# Patient Record
Sex: MALE | Race: WHITE | Employment: UNEMPLOYED | URBAN - METROPOLITAN AREA
[De-identification: names, ages, dates, MRNs, and addresses within clinical notes are randomized per-mention and may not be internally consistent; named-entity substitution may affect disease eponyms.]

---

## 2017-01-11 RX ORDER — DEXTROAMPHETAMINE SACCHARATE, AMPHETAMINE ASPARTATE, DEXTROAMPHETAMINE SULFATE AND AMPHETAMINE SULFATE 5; 5; 5; 5 MG/1; MG/1; MG/1; MG/1
20 TABLET ORAL 2 TIMES DAILY
Qty: 60 TABLET | Refills: 0 | Status: SHIPPED | OUTPATIENT
Start: 2017-01-11 | End: 2017-02-09 | Stop reason: SDUPTHER

## 2017-01-11 RX ORDER — ALPRAZOLAM 2 MG/1
2 TABLET ORAL 3 TIMES DAILY PRN
Qty: 75 TABLET | Refills: 0 | Status: SHIPPED | OUTPATIENT
Start: 2017-01-11 | End: 2017-02-09 | Stop reason: SDUPTHER

## 2017-01-30 RX ORDER — NADOLOL 20 MG/1
20 TABLET ORAL DAILY
Qty: 30 TABLET | Refills: 11 | Status: SHIPPED | OUTPATIENT
Start: 2017-01-30 | End: 2017-03-08 | Stop reason: SDUPTHER

## 2017-01-31 RX ORDER — TRIAMCINOLONE ACETONIDE 0.25 MG/G
CREAM TOPICAL
Qty: 1 TUBE | Refills: 0 | Status: SHIPPED | OUTPATIENT
Start: 2017-01-31 | End: 2017-03-08 | Stop reason: SDUPTHER

## 2017-02-01 ENCOUNTER — OFFICE VISIT (OUTPATIENT)
Dept: DERMATOLOGY | Age: 47
End: 2017-02-01

## 2017-02-01 DIAGNOSIS — L82.1 SEBORRHEIC KERATOSES: ICD-10-CM

## 2017-02-01 DIAGNOSIS — L72.0 EPIDERMAL CYST: ICD-10-CM

## 2017-02-01 DIAGNOSIS — L71.9 ROSACEA: Primary | ICD-10-CM

## 2017-02-01 DIAGNOSIS — L21.9 SEBORRHEIC DERMATITIS: ICD-10-CM

## 2017-02-01 DIAGNOSIS — L73.8 SEBACEOUS HYPERPLASIA: ICD-10-CM

## 2017-02-01 DIAGNOSIS — D22.9 BENIGN NEVUS: ICD-10-CM

## 2017-02-01 PROCEDURE — 99242 OFF/OP CONSLTJ NEW/EST SF 20: CPT | Performed by: DERMATOLOGY

## 2017-02-01 RX ORDER — PIMECROLIMUS 10 MG/G
CREAM TOPICAL
Qty: 1 BOTTLE | Refills: 4 | Status: SHIPPED | OUTPATIENT
Start: 2017-02-01 | End: 2018-11-02 | Stop reason: ALTCHOICE

## 2017-02-04 DIAGNOSIS — R94.6 ABNORMAL THYROID SCAN: Primary | ICD-10-CM

## 2017-02-09 RX ORDER — DEXTROAMPHETAMINE SACCHARATE, AMPHETAMINE ASPARTATE, DEXTROAMPHETAMINE SULFATE AND AMPHETAMINE SULFATE 5; 5; 5; 5 MG/1; MG/1; MG/1; MG/1
20 TABLET ORAL 2 TIMES DAILY
Qty: 60 TABLET | Refills: 0 | Status: SHIPPED | OUTPATIENT
Start: 2017-02-09 | End: 2017-02-15 | Stop reason: SDUPTHER

## 2017-02-09 RX ORDER — ALPRAZOLAM 2 MG/1
2 TABLET ORAL 3 TIMES DAILY PRN
Qty: 75 TABLET | Refills: 0 | Status: SHIPPED | OUTPATIENT
Start: 2017-02-09 | End: 2017-04-05 | Stop reason: SDUPTHER

## 2017-02-15 ENCOUNTER — OFFICE VISIT (OUTPATIENT)
Dept: FAMILY MEDICINE CLINIC | Age: 47
End: 2017-02-15

## 2017-02-15 ENCOUNTER — OFFICE VISIT (OUTPATIENT)
Dept: ENDOCRINOLOGY | Age: 47
End: 2017-02-15

## 2017-02-15 VITALS
DIASTOLIC BLOOD PRESSURE: 82 MMHG | BODY MASS INDEX: 34.21 KG/M2 | HEIGHT: 69 IN | WEIGHT: 231 LBS | HEART RATE: 73 BPM | SYSTOLIC BLOOD PRESSURE: 128 MMHG

## 2017-02-15 VITALS
WEIGHT: 231 LBS | HEIGHT: 69 IN | OXYGEN SATURATION: 98 % | HEART RATE: 73 BPM | BODY MASS INDEX: 34.21 KG/M2 | DIASTOLIC BLOOD PRESSURE: 82 MMHG | SYSTOLIC BLOOD PRESSURE: 128 MMHG | TEMPERATURE: 98.9 F

## 2017-02-15 DIAGNOSIS — R79.89 LOW SERUM CORTISOL LEVEL: ICD-10-CM

## 2017-02-15 DIAGNOSIS — E23.7 PITUITARY ABNORMALITY (HCC): ICD-10-CM

## 2017-02-15 DIAGNOSIS — F41.9 ANXIETY: ICD-10-CM

## 2017-02-15 DIAGNOSIS — J02.9 SORE THROAT: Primary | ICD-10-CM

## 2017-02-15 DIAGNOSIS — R79.89 ABNORMAL THYROID BLOOD TEST: Primary | ICD-10-CM

## 2017-02-15 DIAGNOSIS — J01.90 ACUTE NON-RECURRENT SINUSITIS, UNSPECIFIED LOCATION: ICD-10-CM

## 2017-02-15 DIAGNOSIS — F90.0 ATTENTION DEFICIT HYPERACTIVITY DISORDER (ADHD), PREDOMINANTLY INATTENTIVE TYPE: ICD-10-CM

## 2017-02-15 PROCEDURE — 99214 OFFICE O/P EST MOD 30 MIN: CPT | Performed by: FAMILY MEDICINE

## 2017-02-15 PROCEDURE — 99215 OFFICE O/P EST HI 40 MIN: CPT | Performed by: INTERNAL MEDICINE

## 2017-02-15 RX ORDER — MELATONIN 3 MG
1 TABLET ORAL
Qty: 90 CAPSULE | Refills: 2 | Status: SHIPPED | OUTPATIENT
Start: 2017-02-15 | End: 2021-04-27

## 2017-02-15 RX ORDER — DEXTROAMPHETAMINE SACCHARATE, AMPHETAMINE ASPARTATE, DEXTROAMPHETAMINE SULFATE AND AMPHETAMINE SULFATE 5; 5; 5; 5 MG/1; MG/1; MG/1; MG/1
20 TABLET ORAL 2 TIMES DAILY
Qty: 60 TABLET | Refills: 0 | Status: SHIPPED | OUTPATIENT
Start: 2017-02-15 | End: 2017-04-05 | Stop reason: SDUPTHER

## 2017-02-15 RX ORDER — COSYNTROPIN 0.25 MG/ML
0.25 INJECTION, POWDER, FOR SOLUTION INTRAMUSCULAR; INTRAVENOUS ONCE
Qty: 250 MCG | Refills: 0 | Status: SHIPPED | OUTPATIENT
Start: 2017-02-15 | End: 2017-02-15

## 2017-02-15 RX ORDER — AMOXICILLIN AND CLAVULANATE POTASSIUM 875; 125 MG/1; MG/1
1 TABLET, FILM COATED ORAL 2 TIMES DAILY
Qty: 20 TABLET | Refills: 0 | Status: SHIPPED | OUTPATIENT
Start: 2017-02-15 | End: 2017-02-25

## 2017-02-15 ASSESSMENT — PATIENT HEALTH QUESTIONNAIRE - PHQ9
SUM OF ALL RESPONSES TO PHQ QUESTIONS 1-9: 0
2. FEELING DOWN, DEPRESSED OR HOPELESS: 0
SUM OF ALL RESPONSES TO PHQ9 QUESTIONS 1 & 2: 0
1. LITTLE INTEREST OR PLEASURE IN DOING THINGS: 0

## 2017-02-17 LAB — THROAT CULTURE: NORMAL

## 2017-03-01 ENCOUNTER — HOSPITAL ENCOUNTER (OUTPATIENT)
Dept: OTHER | Age: 47
Discharge: HOME | End: 2017-03-01
Attending: INTERNAL MEDICINE | Admitting: INTERNAL MEDICINE

## 2017-03-01 DIAGNOSIS — R94.6 ABNORMAL THYROID SCAN: ICD-10-CM

## 2017-03-07 ENCOUNTER — TELEPHONE (OUTPATIENT)
Dept: ENDOCRINOLOGY | Age: 47
End: 2017-03-07

## 2017-03-07 ENCOUNTER — HOSPITAL ENCOUNTER (OUTPATIENT)
Dept: OTHER | Age: 47
Discharge: HOME | End: 2017-03-07
Attending: INTERNAL MEDICINE | Admitting: INTERNAL MEDICINE

## 2017-03-07 LAB
CORTISOL - AM: 3.1 UG/DL (ref 4.3–22.4)
VITAMIN D 25-HYDROXY: 35.7 NG/ML

## 2017-03-08 ENCOUNTER — PATIENT MESSAGE (OUTPATIENT)
Dept: FAMILY MEDICINE CLINIC | Age: 47
End: 2017-03-08

## 2017-03-08 RX ORDER — LISINOPRIL 20 MG/1
20 TABLET ORAL DAILY
Qty: 90 TABLET | Refills: 1 | Status: SHIPPED | OUTPATIENT
Start: 2017-03-08 | End: 2017-07-12 | Stop reason: SDUPTHER

## 2017-03-08 RX ORDER — NADOLOL 20 MG/1
20 TABLET ORAL DAILY
Qty: 90 TABLET | Refills: 1 | Status: SHIPPED | OUTPATIENT
Start: 2017-03-08 | End: 2017-07-12 | Stop reason: SDUPTHER

## 2017-03-08 RX ORDER — TRIAMCINOLONE ACETONIDE 0.25 MG/G
CREAM TOPICAL
Qty: 1 TUBE | Refills: 0 | Status: SHIPPED | OUTPATIENT
Start: 2017-03-08 | End: 2017-06-15 | Stop reason: SDUPTHER

## 2017-03-08 RX ORDER — FLUTICASONE PROPIONATE 50 MCG
2 SPRAY, SUSPENSION (ML) NASAL DAILY
Qty: 1 BOTTLE | Refills: 5 | Status: SHIPPED | OUTPATIENT
Start: 2017-03-08 | End: 2018-03-03 | Stop reason: SDUPTHER

## 2017-03-15 LAB — MISCELLANEOUS LAB TEST RESULT: NORMAL

## 2017-03-17 LAB — MISCELLANEOUS LAB TEST ORDER: NORMAL

## 2017-03-22 ENCOUNTER — TELEPHONE (OUTPATIENT)
Dept: ENDOCRINOLOGY | Age: 47
End: 2017-03-22

## 2017-03-22 DIAGNOSIS — R79.89 LOW SERUM CORTISOL LEVEL: Primary | ICD-10-CM

## 2017-03-22 RX ORDER — COSYNTROPIN 0.25 MG/ML
0.25 INJECTION, POWDER, FOR SOLUTION INTRAMUSCULAR; INTRAVENOUS ONCE
Qty: 250 MCG | Refills: 0 | Status: SHIPPED | OUTPATIENT
Start: 2017-03-22 | End: 2017-03-22

## 2017-04-05 RX ORDER — DEXTROAMPHETAMINE SACCHARATE, AMPHETAMINE ASPARTATE, DEXTROAMPHETAMINE SULFATE AND AMPHETAMINE SULFATE 5; 5; 5; 5 MG/1; MG/1; MG/1; MG/1
20 TABLET ORAL 2 TIMES DAILY
Qty: 60 TABLET | Refills: 0 | Status: SHIPPED | OUTPATIENT
Start: 2017-04-05 | End: 2017-05-02 | Stop reason: SDUPTHER

## 2017-04-05 RX ORDER — ALPRAZOLAM 2 MG/1
2 TABLET ORAL 3 TIMES DAILY PRN
Qty: 75 TABLET | Refills: 0 | Status: SHIPPED | OUTPATIENT
Start: 2017-04-05 | End: 2017-05-02 | Stop reason: SDUPTHER

## 2017-04-21 ENCOUNTER — HOSPITAL ENCOUNTER (OUTPATIENT)
Dept: ONCOLOGY | Age: 47
Discharge: HOME | End: 2017-04-24
Attending: INTERNAL MEDICINE | Admitting: INTERNAL MEDICINE

## 2017-04-21 VITALS
SYSTOLIC BLOOD PRESSURE: 111 MMHG | DIASTOLIC BLOOD PRESSURE: 85 MMHG | HEART RATE: 60 BPM | RESPIRATION RATE: 18 BRPM | TEMPERATURE: 98 F

## 2017-04-21 LAB
CORTISOL 30 MIN: 13.8 UG/DL
CORTISOL 60 MIN: 15.1 UG/DL
CORTISOL BASE: 7.6 UG/DL

## 2017-04-21 RX ORDER — COSYNTROPIN 0.25 MG/ML
0.25 INJECTION, POWDER, FOR SOLUTION INTRAMUSCULAR; INTRAVENOUS ONCE
Status: COMPLETED | OUTPATIENT
Start: 2017-04-21 | End: 2017-04-21

## 2017-04-21 RX ADMIN — COSYNTROPIN 0.25 MG: 0.25 INJECTION, POWDER, FOR SOLUTION INTRAMUSCULAR; INTRAVENOUS at 11:25

## 2017-04-26 RX ORDER — MULTIVITAMIN WITH FOLIC ACID 400 MCG
TABLET ORAL
Qty: 30 TABLET | Refills: 1 | Status: SHIPPED | OUTPATIENT
Start: 2017-04-26 | End: 2017-07-12 | Stop reason: SDUPTHER

## 2017-04-28 ENCOUNTER — OFFICE VISIT (OUTPATIENT)
Dept: ENDOCRINOLOGY | Age: 47
End: 2017-04-28

## 2017-04-28 VITALS
BODY MASS INDEX: 33.92 KG/M2 | WEIGHT: 229 LBS | DIASTOLIC BLOOD PRESSURE: 60 MMHG | HEIGHT: 69 IN | HEART RATE: 71 BPM | OXYGEN SATURATION: 96 % | SYSTOLIC BLOOD PRESSURE: 100 MMHG

## 2017-04-28 DIAGNOSIS — E27.49 SECONDARY ADRENAL INSUFFICIENCY (HCC): Primary | ICD-10-CM

## 2017-04-28 DIAGNOSIS — E29.1 HYPOGONADISM MALE: ICD-10-CM

## 2017-04-28 DIAGNOSIS — F51.01 PRIMARY INSOMNIA: ICD-10-CM

## 2017-04-28 DIAGNOSIS — R79.89 ABNORMAL THYROID BLOOD TEST: ICD-10-CM

## 2017-04-28 PROCEDURE — 99214 OFFICE O/P EST MOD 30 MIN: CPT | Performed by: INTERNAL MEDICINE

## 2017-04-28 RX ORDER — DEXAMETHASONE 0.5 MG/1
0.5 TABLET ORAL
Qty: 30 TABLET | Refills: 2 | Status: SHIPPED | OUTPATIENT
Start: 2017-04-28 | End: 2017-08-09 | Stop reason: SDUPTHER

## 2017-04-28 ASSESSMENT — PATIENT HEALTH QUESTIONNAIRE - PHQ9
SUM OF ALL RESPONSES TO PHQ QUESTIONS 1-9: 0
SUM OF ALL RESPONSES TO PHQ9 QUESTIONS 1 & 2: 0
2. FEELING DOWN, DEPRESSED OR HOPELESS: 0
1. LITTLE INTEREST OR PLEASURE IN DOING THINGS: 0

## 2017-05-03 RX ORDER — DEXTROAMPHETAMINE SACCHARATE, AMPHETAMINE ASPARTATE, DEXTROAMPHETAMINE SULFATE AND AMPHETAMINE SULFATE 5; 5; 5; 5 MG/1; MG/1; MG/1; MG/1
20 TABLET ORAL 2 TIMES DAILY
Qty: 60 TABLET | Refills: 0 | Status: SHIPPED | OUTPATIENT
Start: 2017-05-03 | End: 2017-05-17 | Stop reason: SDUPTHER

## 2017-05-03 RX ORDER — ALPRAZOLAM 2 MG/1
2 TABLET ORAL 3 TIMES DAILY PRN
Qty: 75 TABLET | Refills: 0 | Status: SHIPPED | OUTPATIENT
Start: 2017-05-03 | End: 2017-05-30 | Stop reason: SDUPTHER

## 2017-05-17 ENCOUNTER — OFFICE VISIT (OUTPATIENT)
Dept: FAMILY MEDICINE CLINIC | Age: 47
End: 2017-05-17

## 2017-05-17 VITALS
DIASTOLIC BLOOD PRESSURE: 82 MMHG | BODY MASS INDEX: 34.27 KG/M2 | OXYGEN SATURATION: 98 % | HEART RATE: 74 BPM | SYSTOLIC BLOOD PRESSURE: 130 MMHG | WEIGHT: 231.4 LBS | HEIGHT: 69 IN

## 2017-05-17 DIAGNOSIS — F90.0 ATTENTION DEFICIT HYPERACTIVITY DISORDER (ADHD), PREDOMINANTLY INATTENTIVE TYPE: ICD-10-CM

## 2017-05-17 DIAGNOSIS — F51.01 PRIMARY INSOMNIA: ICD-10-CM

## 2017-05-17 DIAGNOSIS — F41.9 ANXIETY: Primary | ICD-10-CM

## 2017-05-17 PROCEDURE — 99214 OFFICE O/P EST MOD 30 MIN: CPT | Performed by: FAMILY MEDICINE

## 2017-05-17 RX ORDER — DEXTROAMPHETAMINE SACCHARATE, AMPHETAMINE ASPARTATE, DEXTROAMPHETAMINE SULFATE AND AMPHETAMINE SULFATE 5; 5; 5; 5 MG/1; MG/1; MG/1; MG/1
20 TABLET ORAL 3 TIMES DAILY
Qty: 90 TABLET | Refills: 0 | Status: SHIPPED | OUTPATIENT
Start: 2017-05-17 | End: 2017-06-02

## 2017-05-17 RX ORDER — CLONAZEPAM 0.5 MG/1
1 TABLET ORAL NIGHTLY PRN
COMMUNITY
Start: 2017-05-04 | End: 2017-08-17

## 2017-05-31 ENCOUNTER — TELEPHONE (OUTPATIENT)
Dept: FAMILY MEDICINE CLINIC | Age: 47
End: 2017-05-31

## 2017-05-31 RX ORDER — ALPRAZOLAM 2 MG/1
2 TABLET ORAL 3 TIMES DAILY PRN
Qty: 75 TABLET | Refills: 0 | Status: SHIPPED | OUTPATIENT
Start: 2017-05-31 | End: 2017-06-30 | Stop reason: SDUPTHER

## 2017-06-01 ENCOUNTER — TELEPHONE (OUTPATIENT)
Dept: FAMILY MEDICINE CLINIC | Age: 47
End: 2017-06-01

## 2017-06-02 RX ORDER — TESTOSTERONE 30 MG/1.5ML
90 SOLUTION TOPICAL DAILY
Qty: 2 BOTTLE | Refills: 5 | Status: SHIPPED | OUTPATIENT
Start: 2017-06-02 | End: 2017-07-07 | Stop reason: SDUPTHER

## 2017-06-02 RX ORDER — DEXTROAMPHETAMINE SACCHARATE, AMPHETAMINE ASPARTATE, DEXTROAMPHETAMINE SULFATE AND AMPHETAMINE SULFATE 7.5; 7.5; 7.5; 7.5 MG/1; MG/1; MG/1; MG/1
30 TABLET ORAL 2 TIMES DAILY
Qty: 60 TABLET | Refills: 0 | Status: SHIPPED | OUTPATIENT
Start: 2017-06-02 | End: 2017-06-30 | Stop reason: SDUPTHER

## 2017-06-16 ENCOUNTER — TELEPHONE (OUTPATIENT)
Dept: FAMILY MEDICINE CLINIC | Age: 47
End: 2017-06-16

## 2017-06-16 RX ORDER — TRIAMCINOLONE ACETONIDE 0.25 MG/G
CREAM TOPICAL
Qty: 15 G | Refills: 2 | Status: SHIPPED | OUTPATIENT
Start: 2017-06-16 | End: 2018-11-02 | Stop reason: ALTCHOICE

## 2017-06-20 ENCOUNTER — PATIENT MESSAGE (OUTPATIENT)
Dept: DERMATOLOGY | Age: 47
End: 2017-06-20

## 2017-06-20 LAB
CORTISOL - AM: 10.7 UG/DL (ref 4.3–22.4)
VITAMIN D 25-HYDROXY: 34.9 NG/ML

## 2017-06-21 ENCOUNTER — OFFICE VISIT (OUTPATIENT)
Dept: ENDOCRINOLOGY | Age: 47
End: 2017-06-21

## 2017-06-21 VITALS
DIASTOLIC BLOOD PRESSURE: 88 MMHG | OXYGEN SATURATION: 97 % | HEIGHT: 69 IN | WEIGHT: 228.4 LBS | SYSTOLIC BLOOD PRESSURE: 130 MMHG | HEART RATE: 61 BPM | BODY MASS INDEX: 33.83 KG/M2

## 2017-06-21 DIAGNOSIS — E27.49 SECONDARY ADRENAL INSUFFICIENCY (HCC): Primary | ICD-10-CM

## 2017-06-21 DIAGNOSIS — R79.89 ELEVATED INSULIN-LIKE GROWTH FACTOR 1 (IGF-1) LEVEL: ICD-10-CM

## 2017-06-21 DIAGNOSIS — E55.9 VITAMIN D DEFICIENCY: ICD-10-CM

## 2017-06-21 DIAGNOSIS — E29.1 HYPOGONADISM MALE: ICD-10-CM

## 2017-06-21 DIAGNOSIS — E23.7 PITUITARY ABNORMALITY (HCC): ICD-10-CM

## 2017-06-21 PROCEDURE — 99214 OFFICE O/P EST MOD 30 MIN: CPT | Performed by: INTERNAL MEDICINE

## 2017-06-21 ASSESSMENT — PATIENT HEALTH QUESTIONNAIRE - PHQ9
1. LITTLE INTEREST OR PLEASURE IN DOING THINGS: 0
SUM OF ALL RESPONSES TO PHQ9 QUESTIONS 1 & 2: 0
SUM OF ALL RESPONSES TO PHQ QUESTIONS 1-9: 0
2. FEELING DOWN, DEPRESSED OR HOPELESS: 0

## 2017-06-23 ENCOUNTER — HOSPITAL ENCOUNTER (OUTPATIENT)
Dept: NON INVASIVE DIAGNOSTICS | Age: 47
Discharge: HOME | End: 2017-06-20
Attending: INTERNAL MEDICINE | Admitting: INTERNAL MEDICINE

## 2017-06-23 ENCOUNTER — HOSPITAL ENCOUNTER (OUTPATIENT)
Dept: NON INVASIVE DIAGNOSTICS | Age: 47
Discharge: HOME | End: 2017-06-23
Attending: INTERNAL MEDICINE | Admitting: INTERNAL MEDICINE

## 2017-06-23 ENCOUNTER — TELEPHONE (OUTPATIENT)
Dept: CARDIOLOGY CLINIC | Age: 47
End: 2017-06-23

## 2017-06-23 LAB
LV EF: 55 %
LVEF MODALITY: NORMAL

## 2017-06-30 RX ORDER — ALPRAZOLAM 2 MG/1
2 TABLET ORAL 3 TIMES DAILY PRN
Qty: 75 TABLET | Refills: 0 | Status: SHIPPED | OUTPATIENT
Start: 2017-06-30 | End: 2017-07-31 | Stop reason: SDUPTHER

## 2017-06-30 RX ORDER — DEXTROAMPHETAMINE SACCHARATE, AMPHETAMINE ASPARTATE, DEXTROAMPHETAMINE SULFATE AND AMPHETAMINE SULFATE 7.5; 7.5; 7.5; 7.5 MG/1; MG/1; MG/1; MG/1
30 TABLET ORAL 2 TIMES DAILY
Qty: 60 TABLET | Refills: 0 | Status: SHIPPED | OUTPATIENT
Start: 2017-06-30 | End: 2017-07-31 | Stop reason: SDUPTHER

## 2017-07-06 RX ORDER — TACROLIMUS 1 MG/G
OINTMENT TOPICAL
Qty: 30 G | Refills: 4 | Status: SHIPPED | OUTPATIENT
Start: 2017-07-06 | End: 2017-11-17

## 2017-07-07 RX ORDER — OMEGA-3-ACID ETHYL ESTERS 1 G/1
2 CAPSULE, LIQUID FILLED ORAL 2 TIMES DAILY
Qty: 360 CAPSULE | Refills: 1 | Status: SHIPPED | OUTPATIENT
Start: 2017-07-07 | End: 2021-10-08

## 2017-07-07 RX ORDER — TESTOSTERONE 30 MG/1.5ML
90 SOLUTION TOPICAL DAILY
Qty: 2 BOTTLE | Refills: 3 | Status: SHIPPED | OUTPATIENT
Start: 2017-07-07 | End: 2021-12-14

## 2017-07-10 RX ORDER — KETOCONAZOLE 20 MG/ML
SHAMPOO TOPICAL
Qty: 120 ML | Refills: 2 | Status: SHIPPED | OUTPATIENT
Start: 2017-07-10 | End: 2018-04-02 | Stop reason: SDUPTHER

## 2017-07-12 ENCOUNTER — OFFICE VISIT (OUTPATIENT)
Dept: CARDIOLOGY CLINIC | Age: 47
End: 2017-07-12

## 2017-07-12 VITALS
SYSTOLIC BLOOD PRESSURE: 127 MMHG | HEIGHT: 69 IN | HEART RATE: 67 BPM | OXYGEN SATURATION: 97 % | BODY MASS INDEX: 33.77 KG/M2 | WEIGHT: 228 LBS | DIASTOLIC BLOOD PRESSURE: 72 MMHG

## 2017-07-12 DIAGNOSIS — E78.2 MIXED HYPERLIPIDEMIA: Primary | ICD-10-CM

## 2017-07-12 DIAGNOSIS — I77.810 AORTIC ROOT DILATATION (HCC): ICD-10-CM

## 2017-07-12 DIAGNOSIS — I71.02 DISSECTION OF ABDOMINAL AORTA (HCC): ICD-10-CM

## 2017-07-12 PROCEDURE — 99214 OFFICE O/P EST MOD 30 MIN: CPT | Performed by: INTERNAL MEDICINE

## 2017-07-12 RX ORDER — LISINOPRIL 20 MG/1
20 TABLET ORAL DAILY
Qty: 90 TABLET | Refills: 3 | Status: SHIPPED | OUTPATIENT
Start: 2017-07-12 | End: 2018-07-26 | Stop reason: SDUPTHER

## 2017-07-12 RX ORDER — NADOLOL 20 MG/1
20 TABLET ORAL DAILY
Qty: 90 TABLET | Refills: 3 | Status: SHIPPED | OUTPATIENT
Start: 2017-07-12 | End: 2018-01-25

## 2017-07-17 ENCOUNTER — PATIENT MESSAGE (OUTPATIENT)
Dept: DERMATOLOGY | Age: 47
End: 2017-07-17

## 2017-07-21 ENCOUNTER — HOSPITAL ENCOUNTER (OUTPATIENT)
Dept: ULTRASOUND IMAGING | Age: 47
Discharge: HOME | End: 2017-07-21
Attending: INTERNAL MEDICINE | Admitting: INTERNAL MEDICINE

## 2017-07-21 ENCOUNTER — HOSPITAL ENCOUNTER (OUTPATIENT)
Dept: OTHER | Age: 47
Discharge: HOME | End: 2017-07-21
Attending: INTERNAL MEDICINE | Admitting: INTERNAL MEDICINE

## 2017-07-21 DIAGNOSIS — I77.810 AORTIC ROOT DILATATION (HCC): ICD-10-CM

## 2017-07-21 DIAGNOSIS — I77.810 THORACIC AORTIC ECTASIA (HCC): ICD-10-CM

## 2017-07-21 DIAGNOSIS — I71.02 DISSECTION OF ABDOMINAL AORTA (HCC): ICD-10-CM

## 2017-07-21 LAB
BASOPHILS ABSOLUTE: 0 K/UL (ref 0–0.2)
BASOPHILS RELATIVE PERCENT: 0.4 %
CHOLESTEROL, FASTING: 184 MG/DL (ref 0–199)
EOSINOPHILS ABSOLUTE: 0.1 K/UL (ref 0–0.6)
EOSINOPHILS RELATIVE PERCENT: 2.4 %
HCT VFR BLD CALC: 47.3 % (ref 40.5–52.5)
HDLC SERPL-MCNC: 27 MG/DL (ref 40–60)
HEMOGLOBIN: 15.6 G/DL (ref 13.5–17.5)
LDL CHOLESTEROL CALCULATED: 130 MG/DL
LYMPHOCYTES ABSOLUTE: 1.6 K/UL (ref 1–5.1)
LYMPHOCYTES RELATIVE PERCENT: 31.5 %
MCH RBC QN AUTO: 28.6 PG (ref 26–34)
MCHC RBC AUTO-ENTMCNC: 33 G/DL (ref 31–36)
MCV RBC AUTO: 86.7 FL (ref 80–100)
MONOCYTES ABSOLUTE: 0.5 K/UL (ref 0–1.3)
MONOCYTES RELATIVE PERCENT: 8.9 %
NEUTROPHILS ABSOLUTE: 2.9 K/UL (ref 1.7–7.7)
NEUTROPHILS RELATIVE PERCENT: 56.8 %
PDW BLD-RTO: 14 % (ref 12.4–15.4)
PLATELET # BLD: 164 K/UL (ref 135–450)
PMV BLD AUTO: 8.7 FL (ref 5–10.5)
PROLACTIN: 8 NG/ML
PROSTATE SPECIFIC ANTIGEN: 1.01 NG/ML (ref 0–4)
RBC # BLD: 5.46 M/UL (ref 4.2–5.9)
T3 FREE: 3.9 PG/ML (ref 2.3–4.2)
T4 FREE: 1.3 NG/DL (ref 0.9–1.8)
TRIGLYCERIDE, FASTING: 136 MG/DL (ref 0–150)
TSH SERPL DL<=0.05 MIU/L-ACNC: 2.51 UIU/ML (ref 0.27–4.2)
VLDLC SERPL CALC-MCNC: 27 MG/DL
WBC # BLD: 5.1 K/UL (ref 4–11)

## 2017-07-22 LAB
GROWTH HORMONE: <0.05 NG/ML (ref 0.05–3)
IGF BINDING PROTEIN-3: 3630 NG/ML (ref 2314–5700)
IGF-1 (INSULIN-LIKE GROWTH I): 153 NG/ML (ref 121–237)
SEX HORMONE BINDING GLOBULIN: 35 NMOL/L (ref 11–80)
TESTOSTERONE FREE PERCENT: 1.8 % (ref 1.6–2.9)
TESTOSTERONE FREE, CALC: 78 PG/ML (ref 47–244)
TESTOSTERONE TOTAL-MALE: 431 NG/DL (ref 300–890)

## 2017-07-23 LAB — T3 REVERSE: 28.9 NG/DL (ref 9–27)

## 2017-07-24 ENCOUNTER — TELEPHONE (OUTPATIENT)
Dept: CARDIOLOGY CLINIC | Age: 47
End: 2017-07-24

## 2017-07-25 LAB
ESTRADIOL LEVEL: 47 PG/ML (ref 10–42)
ESTROGEN TOTAL: 80.5 PG/ML (ref 19–69)
ESTRONE: 33.5 PG/ML (ref 9–36)

## 2017-07-28 RX ORDER — ANASTROZOLE 1 MG/1
1 TABLET ORAL DAILY
Qty: 30 TABLET | Refills: 3 | Status: SHIPPED | OUTPATIENT
Start: 2017-07-28 | End: 2017-11-17

## 2017-07-31 RX ORDER — DEXTROAMPHETAMINE SACCHARATE, AMPHETAMINE ASPARTATE, DEXTROAMPHETAMINE SULFATE AND AMPHETAMINE SULFATE 7.5; 7.5; 7.5; 7.5 MG/1; MG/1; MG/1; MG/1
30 TABLET ORAL 2 TIMES DAILY
Qty: 60 TABLET | Refills: 0 | Status: SHIPPED | OUTPATIENT
Start: 2017-07-31 | End: 2017-08-17 | Stop reason: SDUPTHER

## 2017-07-31 RX ORDER — ALPRAZOLAM 2 MG/1
2 TABLET ORAL 3 TIMES DAILY PRN
Qty: 75 TABLET | Refills: 0 | Status: SHIPPED | OUTPATIENT
Start: 2017-07-31 | End: 2017-08-17 | Stop reason: SDUPTHER

## 2017-08-09 RX ORDER — ERGOCALCIFEROL 1.25 MG/1
CAPSULE ORAL
Qty: 8 CAPSULE | Refills: 1 | Status: SHIPPED | OUTPATIENT
Start: 2017-08-09 | End: 2018-11-02 | Stop reason: ALTCHOICE

## 2017-08-09 RX ORDER — DEXAMETHASONE 0.5 MG/1
TABLET ORAL
Qty: 30 TABLET | Refills: 1 | Status: SHIPPED | OUTPATIENT
Start: 2017-08-09 | End: 2017-11-20

## 2017-08-17 ENCOUNTER — OFFICE VISIT (OUTPATIENT)
Dept: FAMILY MEDICINE CLINIC | Age: 47
End: 2017-08-17

## 2017-08-17 VITALS
HEART RATE: 70 BPM | HEIGHT: 69 IN | WEIGHT: 231 LBS | OXYGEN SATURATION: 96 % | BODY MASS INDEX: 34.21 KG/M2 | DIASTOLIC BLOOD PRESSURE: 74 MMHG | SYSTOLIC BLOOD PRESSURE: 110 MMHG

## 2017-08-17 DIAGNOSIS — F41.9 ANXIETY: ICD-10-CM

## 2017-08-17 DIAGNOSIS — M62.838 TRAPEZIUS MUSCLE SPASM: Primary | ICD-10-CM

## 2017-08-17 DIAGNOSIS — F51.01 PRIMARY INSOMNIA: ICD-10-CM

## 2017-08-17 PROCEDURE — 99214 OFFICE O/P EST MOD 30 MIN: CPT | Performed by: FAMILY MEDICINE

## 2017-08-17 RX ORDER — DEXTROAMPHETAMINE SACCHARATE, AMPHETAMINE ASPARTATE, DEXTROAMPHETAMINE SULFATE AND AMPHETAMINE SULFATE 7.5; 7.5; 7.5; 7.5 MG/1; MG/1; MG/1; MG/1
30 TABLET ORAL 2 TIMES DAILY
Qty: 60 TABLET | Refills: 0 | Status: SHIPPED | OUTPATIENT
Start: 2017-08-17 | End: 2017-09-22 | Stop reason: SDUPTHER

## 2017-08-17 RX ORDER — ALPRAZOLAM 2 MG/1
2 TABLET ORAL 2 TIMES DAILY PRN
Qty: 45 TABLET | Refills: 2 | Status: SHIPPED | OUTPATIENT
Start: 2017-08-17 | End: 2017-10-31 | Stop reason: SDUPTHER

## 2017-08-31 ENCOUNTER — TELEPHONE (OUTPATIENT)
Dept: CARDIOLOGY CLINIC | Age: 47
End: 2017-08-31

## 2017-09-07 ENCOUNTER — TELEPHONE (OUTPATIENT)
Dept: ENDOCRINOLOGY | Age: 47
End: 2017-09-07

## 2017-09-08 ENCOUNTER — HOSPITAL ENCOUNTER (OUTPATIENT)
Dept: PHYSICAL THERAPY | Age: 47
Discharge: HOME | End: 2017-09-30
Admitting: FAMILY MEDICINE

## 2017-09-08 ENCOUNTER — HOSPITAL ENCOUNTER (OUTPATIENT)
Dept: OTHER | Age: 47
Discharge: HOME | End: 2017-09-08
Attending: INTERNAL MEDICINE | Admitting: INTERNAL MEDICINE

## 2017-09-08 LAB
ALBUMIN SERPL-MCNC: 4.2 G/DL (ref 3.4–5)
ALP BLD-CCNC: 74 U/L (ref 40–129)
ALT SERPL-CCNC: 35 U/L (ref 10–40)
AST SERPL-CCNC: 29 U/L (ref 15–37)
BILIRUB SERPL-MCNC: 0.6 MG/DL (ref 0–1)
BILIRUBIN DIRECT: <0.2 MG/DL (ref 0–0.3)
BILIRUBIN, INDIRECT: NORMAL MG/DL (ref 0–1)
FERRITIN: 71.7 NG/ML (ref 30–400)
IRON SATURATION: 24 % (ref 20–50)
IRON: 62 UG/DL (ref 59–158)
MAGNESIUM: 2 MG/DL (ref 1.8–2.4)
TOTAL IRON BINDING CAPACITY: 261 UG/DL (ref 260–445)
TOTAL PROTEIN: 7.4 G/DL (ref 6.4–8.2)

## 2017-09-08 NOTE — PROGRESS NOTES
region C5-C7 moderate pain. Rib rotation     Range of Motion/Strength Testing/Myotomes   Range Tested AROM PROM Resisted/Myotomes Comments   *denotes pain Left (or  neutral)  Right Left Right Left (or neutral) Right    Cervical Flex (C1-2) 52         Cervical Ext 64         Cervical SB (C3) 44 42        Cervical Rot 75% 75%        Shoulder Shrug (C4)          Shoulder Flex WNL WNL   5/5 4+/5    Shoulder Ext     /5 /5    Shoulder Abd (C5) WNL WNL   /5 /5    Shoulder Add          Shoulder IR Mid thoracic Mid thoracic   5/5 5/5    Shoulder ER  T3 T3   5/5 5/5    Elbow flex (C6) WNL WNL   5/5 5/5    Elbow ext (C7) WNL WNL   5/5 5/5    Wrist ext (C6) WNL WNL   5/5 5/5    Wrist flex (C7) WNL WNL   5/5 5/5    Supination  WNL WNL        Pronation WNL WNL        Thumb Ext (C8) WNL WNL   5/5 5/5    Intrinsics (T1) WNL WNL   5/5 5/5                 UE Dermatomes    Dermatomes  Left  Right Comments   Posterior Head (C2) * *    Lateral Upper Neck (C3)      Supraclavicular (C4)      Lateral Upper Arm (C5)      Lateral Forearm/1st (C6)      3rd digit (C7)      5th digit (C8)      Medial Arm (T1)                  * bilateral symmetrical to light touch.      Reflexes  Not assessed    Reflex Left Right Comments   Biceps (C5, C6)      Brachioradialis (C6)      Triceps (C7)      Abductor Digiti Minimi (C8, T1)        Scapula Strength  Will assess next visit   Scapula Left Right Comments   Upper Trapezius /5 /5    Middle Trapezius /5 /5    Lower Trapezius /5 /5    Rhomboid /5 /5    Serratus Anterior   /5 /5    Latissimus Dorsi /5 /5      Flexibility     Muscle Findings Muscle Findings   Pectoralis Minor  Upper Trapezius Tightness bilaterally   Levator Scapula  Suboccipitals    Scalenes  Other      Cervical Special Tests  Not assessed   Special Test Findings Special Test Findings   Alar Ligament  Distraction    Vertebral Artery  Compression    Sharp-Tobi  Thoracic Outlet    Foraminal        UE Special Tests    Test Right Left Comments   TOS neg neg    Impingement  neg neg        Joint Mobility/Accessory Movements (cervical, UE, rib)    Will assess next visit    Functional Outcome Measure    Measure used:  SPADI  Score:  20/130=15%  % Disability:  15%    ASSESSMENT : Patient demonstrates rotation of right rib region. Patient demonstrates tightness in trap and with cervical flexion. GCode:   /CJ    Problems     Decreased Range of Motion   Decreased Strength   Decreased Joint Mobility   Decreased Functional Status   Decreased Flexibility   Poor Posture/Alignment   Increased pain        Rehabilitation Potential:  Good for goals listed below. Strengths for achieving goals include: motivation    Limitations for achieving goals include: severity of condition    Prognosis: [x]    Good []    Fair  []    Poor    GOALS   GCode: /CI  Short Term Goals ( 2   weeks) Long Term Goals (  4 weeks)   1). Initiation of HEP 1). Increase cervical flexion to 60 degrees   2). 2). Patient able to sleep through night with minimal to no noted difficulty. 3). 3). Patient reports no noted numbness with driving   4). 4). Improve SPADI to 19%   5). 5). Increase shoulder flexion right to 5/5   6). 6). PLAN OF CARE     To see patient  2 x/week for  4 weeks for the following treatment interventions:     Therapeutic Exercise   Progressive Resistive Exercise   Modalities of Choice (Heat/Cold/US)   Home Exercise Program   Manual Techniques/Mobilization   Postural Reeducation    Thank you for the referral of this patient.       Timed Code Treatment Minutes:  25  minutes     Total Treatment Time: 64   minutes    Carol Luther PT  license #[id number]

## 2017-09-08 NOTE — FLOWSHEET NOTE
Gait: none today     Neuromuscular Re-Education: posturing      Manual Therapy: MET for right rib rotation x5 with no noted difficulty  10 minutes    Modalities:  None today     Functional Outcome Measure:   Date recorded: 9/8/17  Measure used:  SPADI  Score:  20/130=15%  % Disability:  15%  Assessment/Treatment/Activity Tolerance:    GCode:  /CJ   Patients response to treatment:   [x] Patient tolerated treatment well [] Patient limited by fatigue   [] Patient limited by pain [] Patient limited by other medical complications   [] Other:     Goals:   Progress towards goals:   GOALS   GCode: /CI  Short Term Goals ( 2   weeks) Long Term Goals (  4 weeks)   1). Initiation of HEP 1). Increase cervical flexion to 60 degrees   2). 2). Patient able to sleep through night with minimal to no noted difficulty. 3). 3). Patient reports no noted numbness with driving   4). 4). Improve SPADI to 19%   5). 5). Increase shoulder flexion right to 5/5   6). 6).          Prognosis: [x] Good [] Fair  [] Poor    Patient Requires Follow-up:  [x] Yes  [] No    Plan: [] Continue per plan of care [] Alter current plan (see comments)   [x] Plan of care initiated [] Hold pending MD visit [] Discharge    Timed Code Treatment Minutes:  25    Total Treatment Minutes:  64    Medicare Cap total YTD:  N/A    Electronically signed by:   Gisela Castillo [id number]

## 2017-09-10 LAB — HEPATITIS B CORE TOTAL ANTIBODY: NEGATIVE

## 2017-09-11 LAB
F-ACTIN AB IGG: 23 UNITS (ref 0–19)
SMOOTH MUSCLE AB IGG TITER: ABNORMAL

## 2017-09-15 ENCOUNTER — HOSPITAL ENCOUNTER (OUTPATIENT)
Dept: PHYSICAL THERAPY | Age: 47
Discharge: HOME | End: 2017-09-15
Admitting: FAMILY MEDICINE

## 2017-09-18 ENCOUNTER — TELEPHONE (OUTPATIENT)
Dept: ENDOCRINOLOGY | Age: 47
End: 2017-09-18

## 2017-09-21 ENCOUNTER — HOSPITAL ENCOUNTER (OUTPATIENT)
Dept: PHYSICAL THERAPY | Age: 47
Discharge: HOME | End: 2017-09-21
Admitting: FAMILY MEDICINE

## 2017-09-22 RX ORDER — ALPRAZOLAM 2 MG/1
2 TABLET ORAL 2 TIMES DAILY PRN
Qty: 45 TABLET | Refills: 2 | Status: CANCELLED | OUTPATIENT
Start: 2017-09-22

## 2017-09-25 RX ORDER — DEXTROAMPHETAMINE SACCHARATE, AMPHETAMINE ASPARTATE, DEXTROAMPHETAMINE SULFATE AND AMPHETAMINE SULFATE 7.5; 7.5; 7.5; 7.5 MG/1; MG/1; MG/1; MG/1
30 TABLET ORAL 2 TIMES DAILY
Qty: 60 TABLET | Refills: 0 | Status: SHIPPED | OUTPATIENT
Start: 2017-09-25 | End: 2017-10-31 | Stop reason: SDUPTHER

## 2017-09-26 ENCOUNTER — HOSPITAL ENCOUNTER (OUTPATIENT)
Dept: PHYSICAL THERAPY | Age: 47
Discharge: HOME | End: 2017-09-26
Admitting: FAMILY MEDICINE

## 2017-09-29 ENCOUNTER — HOSPITAL ENCOUNTER (OUTPATIENT)
Dept: PHYSICAL THERAPY | Age: 47
Discharge: HOME | End: 2017-09-29
Admitting: FAMILY MEDICINE

## 2017-09-29 NOTE — FLOWSHEET NOTE
Outpatient Physical Therapy     [x] Daily Treatment Note   [] Progress Note   [] Discharge Note    Date:  9/29/2017    Patient Name:  Karl Setting  \"Jeet\"    YOB: 1970     Medical Diagnosis:             Trapezius muscle spasm right                                                 Treatment Diagnosis: pain and numbness right arm and trap region                                                                    Onset Date: 2/1/17                                          Referral Date:  8/1717                          Referring Physician: Lucie Obrien MD      Visits Allowed/Insurance/Certification Information: Casey Sandoval 30 visits no ionto, no estim     Restrictions/Precautions: none listed on prescription    Plan of care signed (Y/N):  N/A    Progress Note covers period from (if applicable):    []  NA    [] From          To           Next Progress Note due:   10/17/17    Visit# / total visits:  5/8    Plan for Next Session:  Manual techniques, manual distraction, stretching and strengthening exercise. Subjective: Patient noted increased ROM in neck but increased pain 9/10 Wednesday after PT and yesterday. Patient reported no pain during PT treatment. Patient reports today 7/10. Patient feels that it was from increased stretching last visit. Pain level: 7/10 neck and right scapula region, end of treatment neck and scapula region 2-3/10  Evaluation: Patient describes pain to be sharp, aching and throbbing in right UE with numbness off and on with driving and sleeping. Patient reports 1/10 pain at rest,  1-2/10 pain with movement laying on left side 6-7/10 at times. Objective:          Exercises:   Exercises in bold performed in department today  Exercise/Equipment Resistance/Repetitions Other comments    Education: results of evaluation, exercise and plan of care.         Cervical nods   1x10 Supine nods    Shoulder shrugs   2x10     cervical rotations   1x10 Discussed to not push as

## 2017-10-01 ENCOUNTER — HOSPITAL ENCOUNTER (OUTPATIENT)
Dept: OTHER | Age: 47
Discharge: HOME | End: 2017-10-31
Attending: FAMILY MEDICINE | Admitting: FAMILY MEDICINE

## 2017-10-04 ENCOUNTER — TELEPHONE (OUTPATIENT)
Dept: FAMILY MEDICINE CLINIC | Age: 47
End: 2017-10-04

## 2017-10-06 ENCOUNTER — OFFICE VISIT (OUTPATIENT)
Dept: FAMILY MEDICINE CLINIC | Age: 47
End: 2017-10-06

## 2017-10-06 VITALS
DIASTOLIC BLOOD PRESSURE: 78 MMHG | WEIGHT: 230 LBS | OXYGEN SATURATION: 95 % | BODY MASS INDEX: 33.97 KG/M2 | HEART RATE: 76 BPM | SYSTOLIC BLOOD PRESSURE: 128 MMHG

## 2017-10-06 DIAGNOSIS — M54.2 CERVICAL PAIN (NECK): ICD-10-CM

## 2017-10-06 DIAGNOSIS — S46.811A TRAPEZIUS MUSCLE STRAIN, RIGHT, INITIAL ENCOUNTER: Primary | ICD-10-CM

## 2017-10-06 DIAGNOSIS — M25.511 SEVERE SHOULDER PAIN, RIGHT: ICD-10-CM

## 2017-10-06 PROCEDURE — 99214 OFFICE O/P EST MOD 30 MIN: CPT | Performed by: NURSE PRACTITIONER

## 2017-10-06 NOTE — PROGRESS NOTES
HPI:  10/6/2017    This is a 50 y.o. male   Chief Complaint   Patient presents with    Follow-up     has had physical therapy, having new issues with shoulder     Shoulder Pain    The pain is present in the right shoulder, right arm and back (under right scapula). This is a new problem. There has been no history of extremity trauma. The problem has been gradually worsening. The quality of the pain is described as aching and sharp. The pain is severe. Associated symptoms include a limited range of motion and stiffness. Pertinent negatives include no fever, inability to bear weight, itching, joint locking, joint swelling, numbness or tingling. The symptoms are aggravated by activity. He has tried NSAIDS and oral narcotics for the symptoms. The treatment provided no relief. Family history does not include gout or rheumatoid arthritis. There is no history of diabetes, gout, osteoarthritis or rheumatoid arthritis. Has been seeing PT for trapezoid pain without any relief, pain is worsening. Numbness and tingling has resolved. 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 Having trouble getting shower due to pain. /78 (Site: Left Arm)  Pulse 76  Wt 230 lb (104.3 kg)  SpO2 95%  BMI 33.97 kg/m2    Allergies   Allergen Reactions    Ambien [Zolpidem] Other (See Comments)     Blackout lists    Neurontin [Gabapentin] Other (See Comments)     Did not feel good    Topamax Other (See Comments)     Did not feel good    Tramadol Other (See Comments)     Patient has been on high dose pain medincines since 2002 so tramadol doesn't help with pain at all     Current Outpatient Prescriptions   Medication Sig Dispense Refill    diclofenac (VOLTAREN) 50 MG EC tablet Take 1 tablet by mouth 2 times daily 60 tablet 0    amphetamine-dextroamphetamine (ADDERALL, 30MG,) 30 MG tablet Take 1 tablet by mouth 2 times daily .  60 tablet 0    ALPRAZolam (XANAX) 2 MG tablet Take 1 tablet by mouth 2 times daily as needed for Anxiety 45 tablet 2    vitamin D (ERGOCALCIFEROL) 12676 units CAPS capsule TAKE TWO CAPSULES BY MOUTH ONCE WEEKLY 8 capsule 1    dexamethasone (DECADRON) 0.5 MG tablet TAKE ONE TABLET BY MOUTH DAILY WITH BREAKFAST 30 tablet 1    Multiple Vitamin (DAILY-SHANNON) TABS TAKE ONE TABLET BY MOUTH DAILY 30 tablet 1    nadolol (CORGARD) 20 MG tablet Take 1 tablet by mouth daily 90 tablet 3    lisinopril (PRINIVIL;ZESTRIL) 20 MG tablet Take 1 tablet by mouth daily 90 tablet 3    ketoconazole (NIZORAL) 2 % shampoo APPLY 3 TIMES A WEEK 120 mL 2    omega-3 acid ethyl esters (LOVAZA) 1 g capsule Take 2 capsules by mouth 2 times daily 360 capsule 1    Testosterone (AXIRON) 30 MG/ACT SOLN Place 90 mg onto the skin daily 2 Bottle 3    triamcinolone (KENALOG) 0.025 % cream APPLY TO AFFECTED AREA(S) DAILY AS NEEDED 15 g 2    fluticasone (FLONASE) 50 MCG/ACT nasal spray 2 sprays by Nasal route daily 1 Bottle 5    5-Hydroxytryptophan (5-HTP) 50 MG CAPS Take 1 capsule by mouth every morning (before breakfast) 90 capsule 2    Melatonin 2.5 MG CAPS Take 5 mg by mouth       Naloxegol Oxalate (MOVANTIK PO) Take by mouth      Insulin Syringe-Needle U-100 31G X 15/64\" 1 ML MISC 1 each by Does not apply route daily 100 each 3    Misc. Devices MISC BD U Fine II SYP 1 CC, 31G 5/16\"- use daily for [place]    Syringe TB 1 ml 27G 1/2 inch NDL 90 each 5    oxymorphone (OPANA ER) 40 MG ER tablet Take 40 mg by mouth 4 times daily       polyethylene glycol (GLYCOLAX) packet Take 17 g by mouth daily as needed.  sharps container 1 each by Does not apply route as needed. 1 each 3    Alcohol Swabs PADS 1 each by Does not apply route every 7 days. 12 each 3    Amylase-Lipase-Protease (CREON 20 PO) Take 20 mg by mouth three times daily.  omeprazole (PRILOSEC) 20 MG capsule Take 20 mg by mouth 2 times daily.         oxycodone (OXY-IR) 30 MG immediate release tablet Take

## 2017-10-06 NOTE — MR AVS SNAPSHOT
dexamethasone (DECADRON) 0.5 MG tablet TAKE ONE TABLET BY MOUTH DAILY WITH BREAKFAST    anastrozole (ARIMIDEX) 1 MG tablet Take 1 tablet by mouth daily    Multiple Vitamin (DAILY-SHANNON) TABS TAKE ONE TABLET BY MOUTH DAILY    nadolol (CORGARD) 20 MG tablet Take 1 tablet by mouth daily    lisinopril (PRINIVIL;ZESTRIL) 20 MG tablet Take 1 tablet by mouth daily    ketoconazole (NIZORAL) 2 % shampoo APPLY 3 TIMES A WEEK    omega-3 acid ethyl esters (LOVAZA) 1 g capsule Take 2 capsules by mouth 2 times daily    Testosterone (AXIRON) 30 MG/ACT SOLN Place 90 mg onto the skin daily    tacrolimus (PROTOPIC) 0.1 % ointment Apply to affected area BID    triamcinolone (KENALOG) 0.025 % cream APPLY TO AFFECTED AREA(S) DAILY AS NEEDED    fluticasone (FLONASE) 50 MCG/ACT nasal spray 2 sprays by Nasal route daily    5-Hydroxytryptophan (5-HTP) 50 MG CAPS Take 1 capsule by mouth every morning (before breakfast)    pimecrolimus (ELIDEL) 1 % cream Apply topically 2 times daily. Lisdexamfetamine Dimesylate (VYVANSE) 40 MG CAPS Take 40 mg by mouth daily    Melatonin 2.5 MG CAPS Take 5 mg by mouth     Naloxegol Oxalate (MOVANTIK PO) Take by mouth    Insulin Syringe-Needle U-100 31G X 15/64\" 1 ML MISC 1 each by Does not apply route daily    Misc. Devices MISC BD U Fine II SYP 1 CC, 31G 5/16\"- use daily for [place]omatovert    Syringe TB 1 ml 27G 1/2 inch NDL    oxymorphone (OPANA ER) 40 MG ER tablet Take 40 mg by mouth 4 times daily     polyethylene glycol (GLYCOLAX) packet Take 17 g by mouth daily as needed. sharps container 1 each by Does not apply route as needed. Alcohol Swabs PADS 1 each by Does not apply route every 7 days. Syringe/Needle, Disp, (SYRINGE 3CC/25GX1\") 25G X 1\" 3 ML MISC 1 each by Does not apply route every 7 days. To inject Testosterone    Amylase-Lipase-Protease (CREON 20 PO) Take 20 mg by mouth three times daily. omeprazole (PRILOSEC) 20 MG capsule Take 20 mg by mouth 2 times daily. oxycodone (OXY-IR) 30 MG immediate release tablet Take 30 mg by mouth 4 times daily       Allergies              Ambien [Zolpidem] Other (See Comments)    Blackout lists    Neurontin [Gabapentin] Other (See Comments)    Did not feel good    Topamax Other (See Comments)    Did not feel good    Tramadol Other (See Comments)    Patient has been on high dose pain medincines since 2002 so tramadol doesn't help with pain at all         Additional Information        Basic Information     Date Of Birth Sex Race Ethnicity Preferred Language    1970 Male White Non-/Non  English      Problem List as of 10/6/2017  Date Reviewed: 10/6/2017                Abnormal thyroid scan    Pituitary abnormality (HCC)    Aortic root dilatation (HCC)    Seborrheic dermatitis    Obesity (BMI 30.0-34. 9)    Dissection of abdominal aorta (HCC)    Hyperlipidemia    Cubital tunnel syndrome    ADHD (attention deficit hyperactivity disorder)    Elevated insulin-like growth factor 1 (IGF-1) level    Carpal tunnel syndrome    Secondary adrenal insufficiency (HCC)    Benign hypertension    Chronic pain syndrome    Neuropathic pain    Insomnia    Depression    Environmental allergies    Vitamin D deficiency    Chronic pancreatitis (HCC)    Fatigue    Hypogonadism male    Anxiety      Immunizations as of 10/6/2017     Name Date    Influenza, [place], 3 Years and older, IM 11/15/2016      Preventive Care        Date Due    HIV screening is recommended for all people regardless of risk factors  aged 15-65 years at least once (lifetime) who have never been HIV tested. 9/1/1983    Tetanus Combination Vaccine (1 - Tdap) 9/1/1987    Yearly Flu Vaccine (1) 9/1/2017    Diabetes Screening 5/23/2019    Cholesterol Screening 7/21/2022            Live Mat[employer] Signup           Our records indicate that you have an active [employer] account.     You can view your After Visit Summary by going to [email or web address] and logging in with your Swype username and password. If you don't have a Swype username and password but a parent or guardian has access to your record, the parent or guardian should login with their own Swype username and password and access your record to view the After Visit Summary. Additional Information  If you have questions, please contact the physician practice where you receive care. Remember, Swype is NOT to be used for urgent needs. For medical emergencies, dial 911. For questions regarding your Swype account call [phone]. If you have a clinical question, please call your doctor's office.

## 2017-10-17 ENCOUNTER — OFFICE VISIT (OUTPATIENT)
Dept: ORTHOPEDIC SURGERY | Age: 47
End: 2017-10-17

## 2017-10-17 VITALS
WEIGHT: 230 LBS | HEIGHT: 69 IN | BODY MASS INDEX: 34.07 KG/M2 | HEART RATE: 59 BPM | DIASTOLIC BLOOD PRESSURE: 86 MMHG | SYSTOLIC BLOOD PRESSURE: 133 MMHG

## 2017-10-17 DIAGNOSIS — M50.30 DEGENERATIVE DISC DISEASE, CERVICAL: Primary | ICD-10-CM

## 2017-10-17 DIAGNOSIS — M25.511 RIGHT SHOULDER PAIN, UNSPECIFIED CHRONICITY: ICD-10-CM

## 2017-10-17 DIAGNOSIS — M54.2 NECK PAIN: ICD-10-CM

## 2017-10-17 PROCEDURE — 99203 OFFICE O/P NEW LOW 30 MIN: CPT | Performed by: ORTHOPAEDIC SURGERY

## 2017-10-17 PROCEDURE — G8427 DOCREV CUR MEDS BY ELIG CLIN: HCPCS | Performed by: ORTHOPAEDIC SURGERY

## 2017-10-17 PROCEDURE — G8417 CALC BMI ABV UP PARAM F/U: HCPCS | Performed by: ORTHOPAEDIC SURGERY

## 2017-10-17 PROCEDURE — 72040 X-RAY EXAM NECK SPINE 2-3 VW: CPT | Performed by: ORTHOPAEDIC SURGERY

## 2017-10-17 PROCEDURE — G8484 FLU IMMUNIZE NO ADMIN: HCPCS | Performed by: ORTHOPAEDIC SURGERY

## 2017-10-17 PROCEDURE — 73030 X-RAY EXAM OF SHOULDER: CPT | Performed by: ORTHOPAEDIC SURGERY

## 2017-10-17 PROCEDURE — 1036F TOBACCO NON-USER: CPT | Performed by: ORTHOPAEDIC SURGERY

## 2017-10-17 ASSESSMENT — ENCOUNTER SYMPTOMS: SINUS PAIN: 1

## 2017-10-17 NOTE — PROGRESS NOTES
Review of Systems   HENT: Positive for sinus pain. Cardiovascular:        High blood pressure    Gastrointestinal:        Hemorrhoid    Musculoskeletal: Positive for joint pain and neck pain. Right shoulder pain    All other systems reviewed and are negative.

## 2017-10-18 ENCOUNTER — HOSPITAL ENCOUNTER (OUTPATIENT)
Dept: MRI IMAGING | Age: 47
Discharge: HOME | End: 2017-10-18
Admitting: NURSE PRACTITIONER

## 2017-10-18 DIAGNOSIS — M54.2 CERVICAL PAIN (NECK): ICD-10-CM

## 2017-10-18 DIAGNOSIS — M25.511 SEVERE SHOULDER PAIN, RIGHT: ICD-10-CM

## 2017-10-18 DIAGNOSIS — M25.511 PAIN IN RIGHT SHOULDER: ICD-10-CM

## 2017-10-19 DIAGNOSIS — S43.431A TEAR OF RIGHT GLENOID LABRUM, INITIAL ENCOUNTER: ICD-10-CM

## 2017-10-19 DIAGNOSIS — M19.011 PRIMARY OSTEOARTHRITIS OF RIGHT SHOULDER: Primary | ICD-10-CM

## 2017-10-19 NOTE — PROGRESS NOTES
Chief complaint right shoulder and neck pain. Patient has a 9 month history of shoulder and neck discomfort. He feels like that there was no injury that started this. [place] like he might a SLAP oddly. He describes pain primarily in his right upper shoulder as well as going into the right side of his neck. Also has some periscapular pain. He is been in physical therapy and says that this made his pain worse. His most pressing complaint today is numbness and tingling which is radiating down his arm. He says that his pain keeps him up at night and is constant. He is taking diclofenac which she feels is not really helping.     Pain Assessment  Location of Pain: Shoulder  Location Modifiers: Right  Quality of Pain: Aching, Sharp  Duration of Pain: Persistent  Frequency of Pain: Constant  Aggravating Factors:  (moving arm )  Limiting Behavior: Yes  Relieving Factors:  (no relief )  Result of Injury: No  Work-Related Injury: No  Are there other pain locations you wish to document?: No    Past Medical History:   Diagnosis Date    [place] ([address]     Anxiety     Chronic pain syndrome     Chronic pancreatitis (HCC)     Depression     Dyslipidemia     Elevated insulin-like growth factor 1 (IGF-1) level     Hypogonadism male     IFG (impaired fasting glucose)     Insomnia     Low serum cortisol level (HCC)     Neuropathic pain     Pancreatitis     Pituitary adenoma (HCC)     Secondary adrenal insufficiency (HCC)         Past Surgical History:   Procedure Laterality Date    CHOLECYSTECTOMY      ERCP         Family History   Problem Relation Age of Onset    High Blood Pressure Mother     Diabetes Neg Hx        Social History     Social History    Marital status:      Spouse name: N/A    Number of children: N/A    Years of education: N/A     Social History Main Topics    Smoking status: Former Smoker     Years: 2.00    Smokeless tobacco: Never Used    Alcohol use No    Drug use: No    Acromioclavicular joint: Nontender to palpation. Range of Motion: Full range of motion. Strength: Mild supraspinatus muscle weakness secondary to pain. Instability: No anterior or posterior subluxation. Additional Tests:  Positive impingement findings. Neurovascular: Skin warm well perfused. Neurovascularly intact. Left Shoulder Examination:    Inspection: No abnormal swelling. No erythema. No induration. Palpation: No tenderness to palpation. No palpable masses. No crepitus. Acromioclavicular joint: Nontender to palpation. Range of Motion: Full range of motion. Strength: Normal rotator cuff strength. Normal scapulothoracic rhythm. Instability: No anterior or posterior instability. Special Tests: Negative biceps findings. Negative Neer and Princess signs. Negative apprehension sign. Negative [place] sign. Negative thrower's sign. Neurovascular: Skin warm well perfused. Normal sensation to light touch. Examination patient's cervical spine shows limitation of lateral bending as well as right sided paraspinous muscle tenderness and slight spasm. Normal sensation throughout both upper extremities. Symmetric reflexes and no focal motor deficits are appreciated. AP and lateral x-rays of cervical spine shows some calcifications anterior to the C6 7 disc space. There is loss of normal cervical lordosis with straightening of cervical spine being appreciated. 3 x-rays were obtained of the right shoulder today. They include an AP, axillary and outlet views. I reviewed the films today. No acute bony abnormalities are present. My impression is that majority patient's symptoms are on the basis of degenerative disc disease of his cervical spine. I believe that most of his shoulder problems are related to this. He may have some mild impingement but I do not suspect he has any significant rotator cuff tear. I think his main problem is his neck.   He

## 2017-10-19 NOTE — PROGRESS NOTES
I reviewed the patient's MRI findings of cervical spine and they are in agreement that he has a right-sided C6 7 disc protrusion. I reviewed the MRI of his shoulder and it shows that he has some chronic cuff changes with a small partial thickness undersurface tear of the supraspinatus which I do not think is consequential at this time. I believe that the patient's symptoms are related to his neck and suggest referral to a physical medicine and rehabilitation specialist.    Ezequiel Eason MD  Sports Medicine, Knee and Shoulder Surgery    This dictation was performed with a verbal recognition program [facility]) and it was checked for errors. It is possible that there are still dictated errors within this office note. If so, please bring any errors to my attention for an addendum. All efforts were made to ensure that this office note is accurate.

## 2017-10-24 ENCOUNTER — OFFICE VISIT (OUTPATIENT)
Dept: ORTHOPEDIC SURGERY | Age: 47
End: 2017-10-24

## 2017-10-24 VITALS
HEART RATE: 60 BPM | HEIGHT: 69 IN | DIASTOLIC BLOOD PRESSURE: 97 MMHG | WEIGHT: 230 LBS | BODY MASS INDEX: 34.07 KG/M2 | SYSTOLIC BLOOD PRESSURE: 142 MMHG

## 2017-10-24 DIAGNOSIS — M50.20 PROTRUSION OF CERVICAL INTERVERTEBRAL DISC: ICD-10-CM

## 2017-10-24 DIAGNOSIS — M75.111 PARTIAL TEAR OF RIGHT ROTATOR CUFF: Primary | ICD-10-CM

## 2017-10-24 PROCEDURE — G8417 CALC BMI ABV UP PARAM F/U: HCPCS | Performed by: ORTHOPAEDIC SURGERY

## 2017-10-24 PROCEDURE — 1036F TOBACCO NON-USER: CPT | Performed by: ORTHOPAEDIC SURGERY

## 2017-10-24 PROCEDURE — 99214 OFFICE O/P EST MOD 30 MIN: CPT | Performed by: ORTHOPAEDIC SURGERY

## 2017-10-24 PROCEDURE — G8428 CUR MEDS NOT DOCUMENT: HCPCS | Performed by: ORTHOPAEDIC SURGERY

## 2017-10-24 PROCEDURE — G8484 FLU IMMUNIZE NO ADMIN: HCPCS | Performed by: ORTHOPAEDIC SURGERY

## 2017-10-24 ASSESSMENT — ENCOUNTER SYMPTOMS: SINUS PAIN: 1

## 2017-10-24 NOTE — PROGRESS NOTES
Jadiel Fernandez comes in the office for a mri follow up of his neck and his right shoulder. His films shows that he Have a low grade partial-thickness tearing of the rotator cuff and bone spurs on the right side of his neck along with a disc protrusion. He does not report any changes in symptoms. He describes pain primarily in his right upper shoulder as well as going into the right side of his neck. The patient also have periscapular pain. He continues to have the numbness and tingling which also radiates down his arm. He does not recall any specific injury that led to this,but notes that it was worse once he went to therapy. He is currently taking diclofenac which he notes that does not help.      - refer to specialist for neck   - follow once neck issues is taking care of or continues to have pain in shoulder

## 2017-10-25 ENCOUNTER — TELEPHONE (OUTPATIENT)
Dept: ORTHOPEDIC SURGERY | Age: 47
End: 2017-10-25

## 2017-10-25 NOTE — TELEPHONE ENCOUNTER
Tried to call patient to reschedule his appointment on 10/27/17. Dr. Malvin Pearce will not be going to  [facility], wanted to see if the patient could come to [facility] instead. No answer, LVM.

## 2017-10-25 NOTE — TELEPHONE ENCOUNTER
Patient's appointment was moved to 10/26/17 for 2pm at the [address] [place] [address],[address] location

## 2017-10-26 ENCOUNTER — OFFICE VISIT (OUTPATIENT)
Dept: ORTHOPEDIC SURGERY | Age: 47
End: 2017-10-26

## 2017-10-26 ENCOUNTER — TELEPHONE (OUTPATIENT)
Dept: ORTHOPEDIC SURGERY | Age: 47
End: 2017-10-26

## 2017-10-26 VITALS
TEMPERATURE: 97.9 F | HEART RATE: 67 BPM | HEIGHT: 69 IN | SYSTOLIC BLOOD PRESSURE: 147 MMHG | BODY MASS INDEX: 34.07 KG/M2 | DIASTOLIC BLOOD PRESSURE: 103 MMHG | RESPIRATION RATE: 16 BRPM | WEIGHT: 230 LBS

## 2017-10-26 DIAGNOSIS — G56.21 CUBITAL TUNNEL SYNDROME ON RIGHT: ICD-10-CM

## 2017-10-26 DIAGNOSIS — M54.2 CERVICALGIA: Primary | ICD-10-CM

## 2017-10-26 PROCEDURE — G8484 FLU IMMUNIZE NO ADMIN: HCPCS | Performed by: ORTHOPAEDIC SURGERY

## 2017-10-26 PROCEDURE — G8417 CALC BMI ABV UP PARAM F/U: HCPCS | Performed by: ORTHOPAEDIC SURGERY

## 2017-10-26 PROCEDURE — 99243 OFF/OP CNSLTJ NEW/EST LOW 30: CPT | Performed by: ORTHOPAEDIC SURGERY

## 2017-10-26 PROCEDURE — G8427 DOCREV CUR MEDS BY ELIG CLIN: HCPCS | Performed by: ORTHOPAEDIC SURGERY

## 2017-10-26 RX ORDER — PREGABALIN 150 MG/1
150 CAPSULE ORAL EVERY 12 HOURS
Qty: 60 CAPSULE | Refills: 0 | Status: SHIPPED | OUTPATIENT
Start: 2017-10-26 | End: 2017-11-17

## 2017-10-26 NOTE — PROGRESS NOTES
Chief complaint right shoulder pain. Patient is seen for follow-up evaluation of right shoulder pain. He had an MRI showing that he had low-grade partial-thickness tearing of supraspinatus along with some impingement. He's primary complaining now of pain in the periscapular region. This associated with moving his arm but also with moving his head. He also describes some paresthesias which radiate down the lateral aspect of his arm. He is currently taking diclofenac doesn't feel like is helpful.     Pain Assessment  Location of Pain: Neck  Severity of Pain: 7  Quality of Pain: Sharp, Aching  Duration of Pain: Persistent  Frequency of Pain: Constant  Aggravating Factors:  (MOVEMENT)  Limiting Behavior: Yes  Relieving Factors: Rest  Result of Injury: Yes  Work-Related Injury: No  Are there other pain locations you wish to document?: Yes (RIGHT SHOULDER )    Past Medical History:   Diagnosis Date    Acromegalia (HCC)     Anxiety     Chronic pain syndrome     Chronic pancreatitis (HCC)     Depression     Dyslipidemia     Elevated insulin-like growth factor 1 (IGF-1) level     Hypogonadism male     IFG (impaired fasting glucose)     Insomnia     Low serum cortisol level (HCC)     Neuropathic pain     Pancreatitis     Pituitary adenoma (HCC)     Secondary adrenal insufficiency (HCC)         Past Surgical History:   Procedure Laterality Date    CHOLECYSTECTOMY      ERCP         Family History   Problem Relation Age of Onset    High Blood Pressure Mother     Diabetes Neg Hx        Social History     Social History    Marital status:      Spouse name: N/A    Number of children: N/A    Years of education: N/A     Social History Main Topics    Smoking status: Former Smoker     Years: 2.00    Smokeless tobacco: Never Used    Alcohol use No    Drug use: No    Sexual activity: Yes     Partners: Female      Comment: ; 3 children     Other Topics Concern    None     Social History Narrative    None       Current Outpatient Prescriptions   Medication Sig Dispense Refill    diclofenac (VOLTAREN) 50 MG EC tablet Take 1 tablet by mouth 2 times daily 60 tablet 0    amphetamine-dextroamphetamine (ADDERALL, 30MG,) 30 MG tablet Take 1 tablet by mouth 2 times daily . 60 tablet 0    ALPRAZolam (XANAX) 2 MG tablet Take 1 tablet by mouth 2 times daily as needed for Anxiety 45 tablet 2    vitamin D (ERGOCALCIFEROL) 69673 units CAPS capsule TAKE TWO CAPSULES BY MOUTH ONCE WEEKLY 8 capsule 1    dexamethasone (DECADRON) 0.5 MG tablet TAKE ONE TABLET BY MOUTH DAILY WITH BREAKFAST 30 tablet 1    anastrozole (ARIMIDEX) 1 MG tablet Take 1 tablet by mouth daily 30 tablet 3    Multiple Vitamin (DAILY-SHANNON) TABS TAKE ONE TABLET BY MOUTH DAILY 30 tablet 1    nadolol (CORGARD) 20 MG tablet Take 1 tablet by mouth daily 90 tablet 3    lisinopril (PRINIVIL;ZESTRIL) 20 MG tablet Take 1 tablet by mouth daily 90 tablet 3    ketoconazole (NIZORAL) 2 % shampoo APPLY 3 TIMES A WEEK 120 mL 2    omega-3 acid ethyl esters (LOVAZA) 1 g capsule Take 2 capsules by mouth 2 times daily 360 capsule 1    Testosterone (AXIRON) 30 MG/ACT SOLN Place 90 mg onto the skin daily 2 Bottle 3    tacrolimus (PROTOPIC) 0.1 % ointment Apply to affected area BID 30 g 4    triamcinolone (KENALOG) 0.025 % cream APPLY TO AFFECTED AREA(S) DAILY AS NEEDED 15 g 2    fluticasone (FLONASE) 50 MCG/ACT nasal spray 2 sprays by Nasal route daily 1 Bottle 5    5-Hydroxytryptophan (5-HTP) 50 MG CAPS Take 1 capsule by mouth every morning (before breakfast) 90 capsule 2    pimecrolimus (ELIDEL) 1 % cream Apply topically 2 times daily. 1 Bottle 4    Lisdexamfetamine Dimesylate (VYVANSE) 40 MG CAPS Take 40 mg by mouth daily 30 capsule 0    Melatonin 2.5 MG CAPS Take 5 mg by mouth       Naloxegol Oxalate (MOVANTIK PO) Take by mouth      Insulin Syringe-Needle U-100 31G X 15/64\" 1 ML MISC 1 each by Does not apply route daily 100 each 3    Misc. Devices MISC BD U Fine II SYP 1 CC, 31G 5/16\"- use daily for [place]    Syringe TB 1 ml 27G 1/2 inch NDL 90 each 5    oxymorphone (OPANA ER) 40 MG ER tablet Take 40 mg by mouth 4 times daily       polyethylene glycol (GLYCOLAX) packet Take 17 g by mouth daily as needed.  sharps container 1 each by Does not apply route as needed. 1 each 3    Alcohol Swabs PADS 1 each by Does not apply route every 7 days. 12 each 3    Syringe/Needle, Disp, (SYRINGE 3CC/25GX1\") 25G X 1\" 3 ML MISC 1 each by Does not apply route every 7 days. To inject Testosterone 4 each 11    Amylase-Lipase-Protease (CREON 20 PO) Take 20 mg by mouth three times daily.  omeprazole (PRILOSEC) 20 MG capsule Take 20 mg by mouth 2 times daily.  oxycodone (OXY-IR) 30 MG immediate release tablet Take 30 mg by mouth 4 times daily        No current facility-administered medications for this visit. Allergies   Allergen Reactions    Ambien [Zolpidem] Other (See Comments)     Blackout lists    Neurontin [Gabapentin] Other (See Comments)     Did not feel good    Topamax Other (See Comments)     Did not feel good    Tramadol Other (See Comments)     Patient has been on high dose pain medincines since 2002 so tramadol doesn't help with pain at all       Vital signs:  BP (!) 142/97 Comment: pt been advised to follow up with pcp  Pulse 60   Ht 5' 9\" (1.753 m)   Wt 230 lb (104.3 kg)   BMI 33.97 kg/m²        Neuro: Alert & oriented x 3,  normal,  no focal deficits noted. Normal affect. Eyes: sclera clear  Ears: Normal external ear  Mouth:  No perioral lesions  Pulm: Respirations unlabored and regular  Pulse: Regular rate and rhythm   Skin: Warm, well perfused      Examination of cervical spine shows limitation of lateral bending as well as positive Spurling sign. Mild paraspinous muscle tenderness is present bilaterally. Tenderness to palpation along the periscapular region is appreciable. No focal motor deficits present.

## 2017-10-26 NOTE — PROGRESS NOTES
Tramadol Other (See Comments)     Patient has been on high dose pain medincines since 2002 so tramadol doesn't help with pain at all        Past Medical History:   Diagnosis Date    [facility]alia [facility] [address]     Anxiety     Chronic pain syndrome     Chronic pancreatitis ([facility] [address]     Depression     Dyslipidemia     Elevated insulin-like growth factor 1 (IGF-1) level     Hypogonadism male     IFG (impaired fasting glucose)     Insomnia     Low serum cortisol level (HCC)     Neuropathic pain     Pancreatitis     Pituitary adenoma ([facility] [address]     Secondary adrenal insufficiency (HCC)         Past Surgical History:   Procedure Laterality Date    CHOLECYSTECTOMY      ERCP         Social History     Social History Narrative    No narrative on file     Family History   Problem Relation Age of Onset    High Blood Pressure Mother     Diabetes Neg Hx        Current Outpatient Prescriptions   Medication Sig Dispense Refill    diclofenac (VOLTAREN) 50 MG EC tablet Take 1 tablet by mouth 2 times daily 60 tablet 0    amphetamine-dextroamphetamine (ADDERALL, 30MG,) 30 MG tablet Take 1 tablet by mouth 2 times daily .  60 tablet 0    ALPRAZolam (XANAX) 2 MG tablet Take 1 tablet by mouth 2 times daily as needed for Anxiety 45 tablet 2    vitamin D (ERGOCALCIFEROL) 80113 units CAPS capsule TAKE TWO CAPSULES BY MOUTH ONCE WEEKLY 8 capsule 1    dexamethasone (DECADRON) 0.5 MG tablet TAKE ONE TABLET BY MOUTH DAILY WITH BREAKFAST 30 tablet 1    anastrozole (ARIMIDEX) 1 MG tablet Take 1 tablet by mouth daily 30 tablet 3    Multiple Vitamin (DAILY-SHANNON) TABS TAKE ONE TABLET BY MOUTH DAILY 30 tablet 1    nadolol (CORGARD) 20 MG tablet Take 1 tablet by mouth daily 90 tablet 3    lisinopril (PRINIVIL;ZESTRIL) 20 MG tablet Take 1 tablet by mouth daily 90 tablet 3    ketoconazole (NIZORAL) 2 % shampoo APPLY 3 TIMES A WEEK 120 mL 2    omega-3 acid ethyl esters (LOVAZA) 1 g capsule Take 2 capsules by mouth 2 times daily 360 capsule 1 exam:     Cervical spine:  Range of motion is limited in all planes. No obvious deformity or spasm is identified. The occipital nerves are not tender. Upper extremities:  Demonstrate a full free range of motion of the shoulders, elbows, wrists and hands. No gross asymmetry. Motor function is 5/5, sensory intact, and DTR's are 1-2+ bilaterally. Sensory exam is normal.  Pulses are 1+ bilaterally. Kitty's reflexes are absent bilaterally. Shoulders: No evidence of any winging or atrophy. Impingement sign is negative bilaterally. Apprehension sign is negative bilaterally. Elbows: show no evidence of any asymmetry. There is no evidence of any effusion. Range of motion is full with no varus valgus laxity. Tinel's testing is markedly positive on the right over the cubital tunnel. Wrists and hands: show no evidence of any swelling or asymmetry. Digits maintain a full range of motion. There is no clubbing or cyanosis. Lower extremities: Standing limb alignment is normal. Gait is within normal limits without Trendelenburg sign. Hips: show negative logroll bilaterally. Trochanters are nontender. Knees: demonstrate normal alignment. There is no evidence of any effusion. Range of motion is full. Lachman sign negative anterior posterior stress testing Carrie sign negative bilaterally. No sign of laxity to varus valgus stress testing. The patellofemoral joint tracks normally without pain. Ankles and feet show full range of motion with no evidence of hind, mid or forefoot deformity. Lumbosacral spine: reveals no tenderness and no spasm. Range of motion is full. Straight leg raise is negative at 90 degrees on both sides. DTR's are 1+ bilaterally, motor strength is 5/5 and sensation normal, including heel and toe gait. Peripheral pulses are palpable at 1+ bilaterally. Skin: Integument intact. No abrasions.     DATA:    No results found for this or any previous visit (from the past 504 hour(s)). RADIOGRAPHS: MRI cervical spine is reviewed and shows severe foraminal stenosis on the right at C3 4 and C4 5. No other abnormalities are present. No results found.   Reviewed by Dr. Brice Litter:      Michelle Melendez MD [facility]  Spinal [address] [facility]  10/26/2017

## 2017-10-26 NOTE — ADDENDUM NOTE
Encounter addended by:  Valentina Albarado MA on: 10/26/2017  3:27 PM<BR>    Actions taken: Letter status changed

## 2017-10-27 ENCOUNTER — TELEPHONE (OUTPATIENT)
Dept: ORTHOPEDIC SURGERY | Age: 47
End: 2017-10-27

## 2017-10-30 NOTE — TELEPHONE ENCOUNTER
Called and spoke with the patient informing him that Colan Brink had been denied and the insurance company is requesting he try gabapentin, but that is a not an option since the patient is allergic. Dr. Michae Halsted said there was no other medicine he could give him. He said he would call and speak with his insurance company.

## 2017-10-30 NOTE — TELEPHONE ENCOUNTER
Dr. Parish Juarez  you ordered the patient Lyrica on 10/26/17 but insurance denied it. Patient states he is allergic to Gabapentin. Would you like to order a different medication?

## 2017-10-31 ENCOUNTER — OFFICE VISIT (OUTPATIENT)
Dept: ORTHOPEDIC SURGERY | Age: 47
End: 2017-10-31

## 2017-10-31 ENCOUNTER — TELEPHONE (OUTPATIENT)
Dept: ORTHOPEDIC SURGERY | Age: 47
End: 2017-10-31

## 2017-10-31 DIAGNOSIS — M79.601 PAIN OF RIGHT UPPER EXTREMITY: Primary | ICD-10-CM

## 2017-10-31 PROCEDURE — 95886 MUSC TEST DONE W/N TEST COMP: CPT | Performed by: PHYSICAL MEDICINE & REHABILITATION

## 2017-10-31 PROCEDURE — 95908 NRV CNDJ TST 3-4 STUDIES: CPT | Performed by: PHYSICAL MEDICINE & REHABILITATION

## 2017-10-31 RX ORDER — ALPRAZOLAM 2 MG/1
2 TABLET ORAL 2 TIMES DAILY PRN
Qty: 45 TABLET | Refills: 2 | Status: SHIPPED | OUTPATIENT
Start: 2017-10-31 | End: 2018-01-22 | Stop reason: SDUPTHER

## 2017-10-31 RX ORDER — DEXTROAMPHETAMINE SACCHARATE, AMPHETAMINE ASPARTATE, DEXTROAMPHETAMINE SULFATE AND AMPHETAMINE SULFATE 7.5; 7.5; 7.5; 7.5 MG/1; MG/1; MG/1; MG/1
30 TABLET ORAL 2 TIMES DAILY
Qty: 60 TABLET | Refills: 0 | Status: SHIPPED | OUTPATIENT
Start: 2017-10-31 | End: 2017-12-22 | Stop reason: SDUPTHER

## 2017-10-31 NOTE — LETTER
Your outpatient injection is scheduled for 11/20/17 with Dr. Elza Diaz. Octavio. **PLEASE ARRIVE AT: 830AM **    1. Please do not have anything to eat or drink for 2 hours prior to your injection time. If you are receiving planned sedation, please do not eat or drink for 3 hours prior to your injection time. 2. **Please continue taking any daily routine prescribed medications as directed by your physician. **  3. PLEASE HAVE A  AVAILABLE TO TAKE YOU HOME. Please have an adult stay with you for 4-6 hours following your procedure. 4. If you develop a fever or any type of infections prior to your scheduled injection, please contact our office. 5. If you are on antibiotics (i.e. For urinary tract infection, bronchitis, etc.) the antibiotic must be completed and must be symptom free prior to your scheduled procedure. 6.  If you are taking Aspirin, please stop for _____ days, anti-inflammatory medication, i.e. Advil, Aleve, Ibuprofen, Celebrex or Naprosyn, please stop for 3 days prior to your injection. 7. If you are taking any blood thinners, you must obtain clearance for your prescribing physician prior to stopping: Coumadin: 6 days, Plavix: 7 days, Xarelto: 4 days, Pradaxa: 72 hours, Trental: 4 days, Eliquis or Brilinta--will advise. 8. Please advise our office if you have Glaucoma, you will need to obtain clearance from your eye doctor prior to having an Epidural Steroid Injection. 9. **PLEASE BRING A LIST OF YOUR CURRENT MEDICATIONS TO YOUR PROCEDURE**        Insurance Information:    Our office will contact your insurance company to complete any prior authorization notification that    needs to be completed prior to your procedure. Please make sure we are notified of any insurance changes prior to your procedure. If you have any questions, please feel free to contact me at [phone] ext.  [phone]      Thank you,       Sincere Tipton LPN

## 2017-10-31 NOTE — PROGRESS NOTES
[address] MEDICINE      Patient: Estrella Brink Age: 50 Years 1 Months  Sex: Male Date: 10/31/2017  YOB: 1970 Ref. Phys.: Dr Kaitlin Trujillo  Notes:  r/o right cervical radiculopathy; 1 mo right radiating neck pain aftet PT traction; r/o right ulnar neuorpathy vs cervical radiculopathy; MRI right C6-7 HNP and foraminal stenosis      Sensory NCS      Nerve / Sites Peak PeakAmp Dist Gibson    ms µV cm m/s   R MEDIAN - D2 ULNAR D5   1. Median Wrist 3.45 22.2 14 51.9   2. Ulnar Wrist 3.15 24.2 14 53.8       Motor NCS      Nerve / Sites Lat Amp Amp Dist Gibson    ms mV % cm m/s   R MEDIAN - APB   1. Wrist 3.65 6.5 100 8    2. Elbow 8.20 6.4 98.3 28 61.5   R ULNAR - ADM   1. Wrist 2.85 10.4 100 8    2. B. Elbow 6.15 11.2 108 21 63.6   3. A. Elbow 8.10 11.0 106 10 51.3       EMG Summary Table     Spontaneous MUAP Recruit. Ins. Act Fibs. PSW Fasics. H.F. Amp. Dur. Poly's. Pattern   R. FIRST D INTEROSS N None None None None N N N N   R. BICEPS N None None None None N N N N   R. TRICEPS N None None None None N N N N   R. EXT CARPI R BREV N None None None None N N N N   R. EXT DIG COMM N None None None None N N N N   R. PRON TERES N None None None None N N N N   R. CERV PSP (L) N None None None None N N N N   R. DELTOID N None None None None N N N N       Summary: Nerve conduction studies and monopolar exam of the right upper extremity are normal.      Impression: Normal examination. 1. No right C7 radiculopathy to correlate to right C6-7 foraminal HNP  2. No right ulnar mononeuropathy at the elbow   3.  No evidence of any other right upper extremity mononeuropathy, plexopathy, or radiculopathy            Padma Tinoco MD

## 2017-10-31 NOTE — TELEPHONE ENCOUNTER
Adderall - 9/25/17  Xanax - 8/17/17  Last Office Visit 10/6/17   Return in about 6 weeks (around 11/17/2017).    Pending Appointments 11/17/17

## 2017-11-01 ENCOUNTER — HOSPITAL ENCOUNTER (OUTPATIENT)
Dept: OTHER | Age: 47
Discharge: HOME | End: 2017-11-30
Attending: FAMILY MEDICINE | Admitting: FAMILY MEDICINE

## 2017-11-01 NOTE — ADDENDUM NOTE
Encounter addended by: Michelle Mansfield MA on: 11/1/2017  3:40 PM<BR>    Actions taken: Letter status changed

## 2017-11-15 ENCOUNTER — PAT TELEPHONE (OUTPATIENT)
Dept: PREADMISSION TESTING | Age: 47
End: 2017-11-15

## 2017-11-15 RX ORDER — SODIUM CHLORIDE, SODIUM LACTATE, POTASSIUM CHLORIDE, CALCIUM CHLORIDE 600; 310; 30; 20 MG/100ML; MG/100ML; MG/100ML; MG/100ML
INJECTION, SOLUTION INTRAVENOUS ONCE
Status: CANCELLED | OUTPATIENT
Start: 2017-11-20

## 2017-11-17 ENCOUNTER — OFFICE VISIT (OUTPATIENT)
Dept: FAMILY MEDICINE CLINIC | Age: 47
End: 2017-11-17

## 2017-11-17 ENCOUNTER — TELEPHONE (OUTPATIENT)
Dept: ORTHOPEDIC SURGERY | Age: 47
End: 2017-11-17

## 2017-11-17 VITALS
SYSTOLIC BLOOD PRESSURE: 128 MMHG | BODY MASS INDEX: 34.66 KG/M2 | OXYGEN SATURATION: 98 % | WEIGHT: 234 LBS | DIASTOLIC BLOOD PRESSURE: 78 MMHG | HEART RATE: 76 BPM | TEMPERATURE: 98 F | HEIGHT: 69 IN

## 2017-11-17 DIAGNOSIS — F41.9 ANXIETY: ICD-10-CM

## 2017-11-17 DIAGNOSIS — B35.6 TINEA CRURIS: ICD-10-CM

## 2017-11-17 DIAGNOSIS — F90.0 ATTENTION DEFICIT HYPERACTIVITY DISORDER (ADHD), PREDOMINANTLY INATTENTIVE TYPE: ICD-10-CM

## 2017-11-17 DIAGNOSIS — Z00.00 WELL ADULT EXAM: Primary | ICD-10-CM

## 2017-11-17 PROCEDURE — 90471 IMMUNIZATION ADMIN: CPT | Performed by: FAMILY MEDICINE

## 2017-11-17 PROCEDURE — 99396 PREV VISIT EST AGE 40-64: CPT | Performed by: FAMILY MEDICINE

## 2017-11-17 PROCEDURE — 90688 IIV4 VACCINE SPLT 0.5 ML IM: CPT | Performed by: FAMILY MEDICINE

## 2017-11-17 PROCEDURE — 90472 IMMUNIZATION ADMIN EACH ADD: CPT | Performed by: FAMILY MEDICINE

## 2017-11-17 PROCEDURE — 90715 TDAP VACCINE 7 YRS/> IM: CPT | Performed by: FAMILY MEDICINE

## 2017-11-17 NOTE — PROGRESS NOTES
Subjective:      Patient ID: Kalpana Anderson is a 50 y.o. male. HPI   Pt is a of 50 y.o. male comes in today with   Chief Complaint   Patient presents with    Annual Exam     Has been following with neurosurgeon and endocrine for pituitary problem (potentially cyclical acromegaly). Trying to get in to see another specialist.  Ney Lynch with jock itch as well. Tried lotrimin for 2 weeks. Didn't help so switched to the spray. Has helped a little more. Taking xanax as directed  Still struggling between neck and shoulder pain. Allergies   Allergen Reactions    Ambien [Zolpidem] Other (See Comments)     Blackout lists    Elavil [Amitriptyline] Other (See Comments)     SICK TO STOMACH, JITTERY FEELING    Neurontin [Gabapentin] Other (See Comments)     Did not feel good    Topamax Other (See Comments)     Did not feel good    Tramadol Other (See Comments)     Patient has been on high dose pain medincines since 2002 so tramadol doesn't help with pain at all     Current Outpatient Prescriptions on File Prior to Visit   Medication Sig Dispense Refill    ALPRAZolam (XANAX) 2 MG tablet Take 1 tablet by mouth 2 times daily as needed for Anxiety 45 tablet 2    amphetamine-dextroamphetamine (ADDERALL, 30MG,) 30 MG tablet Take 1 tablet by mouth 2 times daily .  60 tablet 0    vitamin D (ERGOCALCIFEROL) 16563 units CAPS capsule TAKE TWO CAPSULES BY MOUTH ONCE WEEKLY (Patient taking differently: TAKE ONE CAPSULE BY MOUTH ONCE WEEKLY) 8 capsule 1    dexamethasone (DECADRON) 0.5 MG tablet TAKE ONE TABLET BY MOUTH DAILY WITH BREAKFAST 30 tablet 1    Multiple Vitamin (DAILY-SHANNON) TABS TAKE ONE TABLET BY MOUTH DAILY 30 tablet 1    nadolol (CORGARD) 20 MG tablet Take 1 tablet by mouth daily 90 tablet 3    lisinopril (PRINIVIL;ZESTRIL) 20 MG tablet Take 1 tablet by mouth daily 90 tablet 3    ketoconazole (NIZORAL) 2 % shampoo APPLY 3 TIMES A WEEK 120 mL 2    omega-3 acid ethyl esters (LOVAZA) 1 g capsule Take 2 capsules by mouth 2 times daily 360 capsule 1    Testosterone (AXIRON) 30 MG/ACT SOLN Place 90 mg onto the skin daily 2 Bottle 3    triamcinolone (KENALOG) 0.025 % cream APPLY TO AFFECTED AREA(S) DAILY AS NEEDED 15 g 2    fluticasone (FLONASE) 50 MCG/ACT nasal spray 2 sprays by Nasal route daily 1 Bottle 5    5-Hydroxytryptophan (5-HTP) 50 MG CAPS Take 1 capsule by mouth every morning (before breakfast) 90 capsule 2    pimecrolimus (ELIDEL) 1 % cream Apply topically 2 times daily. 1 Bottle 4    Melatonin 2.5 MG CAPS Take 5 mg by mouth       Naloxegol Oxalate (MOVANTIK PO) Take by mouth      oxymorphone (OPANA ER) 40 MG ER tablet Take 40 mg by mouth 4 times daily       polyethylene glycol (GLYCOLAX) packet Take 17 g by mouth daily as needed.  Alcohol Swabs PADS 1 each by Does not apply route every 7 days. 12 each 3    Amylase-Lipase-Protease (CREON 20 PO) Take 20 mg by mouth three times daily.  omeprazole (PRILOSEC) 20 MG capsule Take 20 mg by mouth 2 times daily.  oxycodone (OXY-IR) 30 MG immediate release tablet Take 30 mg by mouth 4 times daily       Syringe/Needle, Disp, (SYRINGE 3CC/25GX1\") 25G X 1\" 3 ML MISC 1 each by Does not apply route every 7 days. To inject Testosterone 4 each 11     No current facility-administered medications on file prior to visit. Review of Systems   Constitutional: Negative for fatigue and unexpected weight change. Respiratory: Negative for cough and shortness of breath. Cardiovascular: Negative for chest pain. Gastrointestinal: Negative. Genitourinary: Negative. Skin: Negative for rash. Objective:   Physical Exam   Constitutional: He is oriented to person, place, and time. He appears well-developed and well-nourished. No distress. HENT:   Head: Normocephalic and atraumatic. Mouth/Throat: Oropharynx is clear and moist.   Eyes: No scleral icterus. Neck: Normal range of motion. Neck supple. No tracheal deviation present.  No thyromegaly present. Cardiovascular: Normal rate, regular rhythm and normal heart sounds. No murmur heard. Pulmonary/Chest: Effort normal and breath sounds normal.   Musculoskeletal: He exhibits no edema. Lymphadenopathy:        Head (right side): No submental and no submandibular adenopathy present. Head (left side): No submental and no submandibular adenopathy present. He has no cervical adenopathy. Neurological: He is alert and oriented to person, place, and time. No cranial nerve deficit. Skin: Skin is warm and dry. He is not diaphoretic. Psychiatric: He has a normal mood and affect. His behavior is normal. Judgment and thought content normal.       Assessment:      1. Well adult exam     2. Anxiety     3. Attention deficit hyperactivity disorder (ADHD), predominantly inattentive type            Plan:      1. Diet and exercise stable. Recent bloodwork done  2. Stable  Controlled Substances Monitoring: Attestation: The Prescription Monitoring Report for this patient was reviewed today. Mumtaz Davidson MD)  Documentation: Possible medication side effects, risk of tolerance and/or dependence, and alternative treatments discussed., No signs of potential drug abuse or diversion identified. Mumtaz Davidson MD)   3. Stable on adderall. Call for rf when needed.

## 2017-11-19 ASSESSMENT — ENCOUNTER SYMPTOMS
GASTROINTESTINAL NEGATIVE: 1
SHORTNESS OF BREATH: 0
COUGH: 0

## 2017-11-20 ENCOUNTER — HOSPITAL ENCOUNTER (OUTPATIENT)
Dept: PAIN MANAGEMENT | Age: 47
Discharge: HOME | End: 2017-11-20
Attending: PHYSICAL MEDICINE & REHABILITATION | Admitting: PHYSICAL MEDICINE & REHABILITATION

## 2017-11-20 VITALS
RESPIRATION RATE: 18 BRPM | SYSTOLIC BLOOD PRESSURE: 108 MMHG | DIASTOLIC BLOOD PRESSURE: 75 MMHG | TEMPERATURE: 98.8 F | HEART RATE: 70 BPM | BODY MASS INDEX: 34.66 KG/M2 | WEIGHT: 234 LBS | HEIGHT: 69 IN | OXYGEN SATURATION: 97 %

## 2017-11-20 RX ORDER — MIDAZOLAM HYDROCHLORIDE 1 MG/ML
INJECTION INTRAMUSCULAR; INTRAVENOUS
Status: DISPENSED
Start: 2017-11-20 | End: 2017-11-20

## 2017-11-20 RX ORDER — CLONAZEPAM 1 MG/1
2 TABLET ORAL NIGHTLY PRN
COMMUNITY
End: 2018-01-25 | Stop reason: SDUPTHER

## 2017-11-20 RX ORDER — FENTANYL CITRATE 50 UG/ML
INJECTION, SOLUTION INTRAMUSCULAR; INTRAVENOUS
Status: DISPENSED
Start: 2017-11-20 | End: 2017-11-20

## 2017-11-20 RX ORDER — SODIUM CHLORIDE, SODIUM LACTATE, POTASSIUM CHLORIDE, CALCIUM CHLORIDE 600; 310; 30; 20 MG/100ML; MG/100ML; MG/100ML; MG/100ML
INJECTION, SOLUTION INTRAVENOUS ONCE
Status: COMPLETED | OUTPATIENT
Start: 2017-11-20 | End: 2017-11-20

## 2017-11-20 RX ADMIN — SODIUM CHLORIDE, SODIUM LACTATE, POTASSIUM CHLORIDE, CALCIUM CHLORIDE: 600; 310; 30; 20 INJECTION, SOLUTION INTRAVENOUS at 09:46

## 2017-11-20 ASSESSMENT — PAIN SCALES - GENERAL: PAINLEVEL_OUTOF10: 0

## 2017-11-20 ASSESSMENT — PAIN DESCRIPTION - DESCRIPTORS: DESCRIPTORS: ACHING;BURNING;SHARP;NUMBNESS

## 2017-11-20 ASSESSMENT — PAIN - FUNCTIONAL ASSESSMENT: PAIN_FUNCTIONAL_ASSESSMENT: 0-10

## 2017-11-20 NOTE — PROGRESS NOTES
NURSING CARE PLANS FOLLOWED     · Potential for anxiety-decrease anxiety-allow patient to verbalize  · Potential for infection-no infection-proper infection controls  · Potential for fall the patient will move to fall risk after procedure- through the recovery  phase   · [place] to environment  · Call light in reach  · Instruct to call for assistance prior to getting up  · Non skid footwear on   · Bed wheels locked and bed in lowest position - siderails up times two  · Potential for deep sedation-no deep sedation-know maximum allowable dose         adverse reactions and nursing considerations.  Assess VS and LOC

## 2017-11-20 NOTE — H&P
HISTORY AND PHYSICAL/PRE-SEDATION ASSESSMENT    Patient:  Maria Isabel Arias   :  1970  Medical Record No.:  [id number]   Date:  17  Physician:  Mell Friedman M.D. Facility: [facility]     Nursing History and Physical reviewed and agreed upon. Additional findings:    Allergies:  Ambien [zolpidem]; Elavil [amitriptyline]; Neurontin [gabapentin]; Topamax; and Tramadol    Home Medications:    Prior to Admission medications    Medication Sig Start Date End Date Taking? Authorizing Provider   ALPRAZobranden Ashford) 2 MG tablet Take 1 tablet by mouth 2 times daily as needed for Anxiety 10/31/17   Phan Yang MD   amphetamine-dextroamphetamine (ADDERALL, 30MG,) 30 MG tablet Take 1 tablet by mouth 2 times daily .  10/31/17   Phan Yang MD   vitamin D (ERGOCALCIFEROL) 51617 units CAPS capsule TAKE TWO CAPSULES BY MOUTH ONCE WEEKLY  Patient taking differently: TAKE ONE CAPSULE BY MOUTH ONCE WEEKLY 17   Puma Long MD   dexamethasone (DECADRON) 0.5 MG tablet TAKE ONE TABLET BY MOUTH DAILY WITH BREAKFAST 17   Toro Steiner MD   Multiple Vitamin (DAILY-SHANNON) TABS TAKE ONE TABLET BY MOUTH DAILY 17   RENO Mckeon   nadolol (CORGARD) 20 MG tablet Take 1 tablet by mouth daily 17   Trever Gilliland MD   lisinopril (PRINIVIL;ZESTRIL) 20 MG tablet Take 1 tablet by mouth daily 17   Trever Gilliland MD   ketoconazole (NIZORAL) 2 % shampoo APPLY 3 TIMES A WEEK 7/10/17   Phan Yang MD   omega-3 acid ethyl esters (LOVAZA) 1 g capsule Take 2 capsules by mouth 2 times daily 17   RENO Mckeon   Testosterone Raymon Batter) 30 MG/ACT SOLN Place 90 mg onto the skin daily 17   RENO Mckeon   triamcinolone (KENALOG) 0.025 % cream APPLY TO AFFECTED AREA(S) DAILY AS NEEDED 17   Phan Yang MD   fluticasone [facility]) 50 MCG/ACT nasal spray 2 sprays by Nasal route daily 3/8/17   Phan Yang MD   5-Hydroxytryptophan (5-HTP) 50 MG CAPS Take 1 capsule by mouth every morning (before breakfast) 2/15/17   Moraima Suazo MD   pimecrolimus (ELIDEL) 1 % cream Apply topically 2 times daily. 2/1/17   Nazanin Quintero MD   Melatonin 2.5 MG CAPS Take 5 mg by mouth     Historical Provider, MD   Naloxegol Oxalate (MOVANTIK PO) Take by mouth    Historical Provider, MD   oxymorphone (OPANA ER) 40 MG ER tablet Take 40 mg by mouth 4 times daily     Historical Provider, MD   polyethylene glycol (GLYCOLAX) packet Take 17 g by mouth daily as needed. Historical Provider, MD   Alcohol Swabs PADS 1 each by Does not apply route every 7 days. 6/16/14   Moraima Suazo MD   Syringe/Needle, Disp, (SYRINGE 3CC/25GX1\") 25G X 1\" 3 ML MISC 1 each by Does not apply route every 7 days. To inject Testosterone 1/6/14 4/28/17  Moraima Suazo MD   Amylase-Lipase-Protease (CREON 20 PO) Take 20 mg by mouth three times daily. Historical Provider, MD   omeprazole (PRILOSEC) 20 MG capsule Take 20 mg by mouth 2 times daily. Historical Provider, MD   oxycodone (OXY-IR) 30 MG immediate release tablet Take 30 mg by mouth 4 times daily  5/3/10   Historical Provider, MD       Vitals: Stable       PHYSICAL EXAM:  HENT: Airway patent and reviewed  Cardiovascular: Normal rate, regular rhythm, normal heart sounds. Pulmonary/Chest: No wheezes. No rhonchi. No rales. Abdominal: Soft. Bowel sounds are normal. No distension. ASA CLASS:         []   I. Normal, healthy adult           [x]   II.  Mild systemic disease            []   III. Severe systemic disease      Sedation plan:   [x]  Local              [x]  Minimal                  []  General anesthesia    Patient's condition acceptable for planned procedure/sedation. Post Procedure Plan   Return to same level of care   ______________________     The risks and benefits as well as alternatives to the procedure have been discussed with the patient and or family.   The patient and or next of kin

## 2017-11-29 NOTE — ADDENDUM NOTE
Encounter addended by: Altagracia Patricio on: 11/29/2017  3:41 PM<BR>    Actions taken: Letter status changed

## 2017-12-07 ENCOUNTER — TELEPHONE (OUTPATIENT)
Dept: FAMILY MEDICINE CLINIC | Age: 47
End: 2017-12-07

## 2017-12-08 ENCOUNTER — OFFICE VISIT (OUTPATIENT)
Dept: ORTHOPEDIC SURGERY | Age: 47
End: 2017-12-08

## 2017-12-08 VITALS
HEART RATE: 68 BPM | DIASTOLIC BLOOD PRESSURE: 61 MMHG | WEIGHT: 233.91 LBS | HEIGHT: 69 IN | SYSTOLIC BLOOD PRESSURE: 114 MMHG | BODY MASS INDEX: 34.64 KG/M2

## 2017-12-08 DIAGNOSIS — M54.12 CERVICAL RADICULITIS: ICD-10-CM

## 2017-12-08 DIAGNOSIS — M50.30 DDD (DEGENERATIVE DISC DISEASE), CERVICAL: Primary | ICD-10-CM

## 2017-12-08 PROCEDURE — G8427 DOCREV CUR MEDS BY ELIG CLIN: HCPCS | Performed by: PHYSICIAN ASSISTANT

## 2017-12-08 PROCEDURE — 99213 OFFICE O/P EST LOW 20 MIN: CPT | Performed by: PHYSICIAN ASSISTANT

## 2017-12-08 PROCEDURE — G8484 FLU IMMUNIZE NO ADMIN: HCPCS | Performed by: PHYSICIAN ASSISTANT

## 2017-12-08 PROCEDURE — 1036F TOBACCO NON-USER: CPT | Performed by: PHYSICIAN ASSISTANT

## 2017-12-08 PROCEDURE — G8417 CALC BMI ABV UP PARAM F/U: HCPCS | Performed by: PHYSICIAN ASSISTANT

## 2017-12-08 NOTE — PROGRESS NOTES
Hypogonadism male     IFG (impaired fasting glucose)     Insomnia     Low serum cortisol level (HCC)     Neuropathic pain     Pancreatitis     Pituitary adenoma (HCC)     Secondary adrenal insufficiency (HCC)         REVIEW OF SYSTEMS:   CONSTITUTIONAL: Denies unexplained weight loss, fevers, chills or fatigue  NEUROLOGIC: Denies tremors or seizures         PHYSICAL EXAM:    Vitals: Blood pressure 114/61, pulse 68, height 5' 9.02\" (1.753 m), weight 233 lb 14.5 oz (106.1 kg). GENERAL EXAM:  · General Apparence: Patient is adequately groomed with no evidence of malnutrition. · Orientation: The patient is oriented to time, place and person. · Mood & Affect:The patient's mood and affect are appropriate   · Vascular: Examination reveals no swelling tenderness in upper or lower extremities. · Lymphatic: The lymphatic examination bilaterally reveals all areas to be without enlargement or induration  · Sensation: Sensation is intact without deficit  · Coordination/Balance: Good coordination   CERVICAL EXAMINATION:  · Inspection: Local inspection shows no step-off or bruising. Cervical alignment is normal.     · Palpation: No evidence of tenderness at the midline, and trapezius. Paraspinal tenderness is present. There is no step-off or paraspinal spasm. · Range of Motion: Intact flexion mild to moderate loss of extension and right lateral rotation  · Strength: 5/5 bilateral upper extremities   · Special Tests:    ·   Spurling's negative today, L'Hermitte's & Kearns's negative bilaterally. ·   Durbin and Impingement tests are negative bilaterally. ·  Cubital and Carpal tunnel Tinel's negative bilaterally. · Skin:There are no rashes, ulcerations or lesions in right & left upper extremities. · Reflexes: Bilaterally triceps, biceps and brachioradialis are 1-2+. Clonus absent bilaterally at the feet.    · Gait & station: Normal unassisted     · Additional Examinations:       · RIGHT UPPER EXTREMITY: Inspection/examination of the right upper extremity does not show any tenderness, deformity or injury. Range of motion is full. There is no gross instability. There are no rashes, ulcerations or lesions. Strength and tone are normal.  · LEFT UPPER EXTREMITY: Inspection/examination of the left upper extremity does not show any tenderness, deformity or injury. Range of motion is full. There is no gross instability. There are no rashes, ulcerations or lesions.  Strength and tone are normal.  ·   Diagnostic Testing:   Cervical MRI scan and report reviewed from 10/18/2017 showing C6 7 disc protrusion eccentric towards the right causing moderate to severe right foraminal stenosis, varying degrees of multilevel right foraminal narrowing no significant central stenosis    Right upper extremity EMG from 10/31/2017 is normal          Impression:  1) 3mo right cervical radiculitis---85% improved  2) Right C6-7 protrusion mod-sev FS  3) Sx consult, Dr. Lazara Galloway   4) chronic opioid maintenance, Opana for pancreatitis      Plan:  1) PT for above  2) Call for R C6-7 LILIA#2 if symptoms worsen            [facility]

## 2017-12-22 RX ORDER — DEXTROAMPHETAMINE SACCHARATE, AMPHETAMINE ASPARTATE, DEXTROAMPHETAMINE SULFATE AND AMPHETAMINE SULFATE 7.5; 7.5; 7.5; 7.5 MG/1; MG/1; MG/1; MG/1
30 TABLET ORAL 2 TIMES DAILY
Qty: 60 TABLET | Refills: 0 | Status: SHIPPED | OUTPATIENT
Start: 2017-12-22 | End: 2018-01-22 | Stop reason: SDUPTHER

## 2017-12-22 NOTE — TELEPHONE ENCOUNTER
From: Karl Setting  Sent: 12/21/2017 3:36 PM EST  Subject: Medication Renewal Request    [place] would like a refill of the following medications:  amphetamine-dextroamphetamine (ADDERALL, 30MG,) 30 MG tablet Lucie Obrien MD]    Preferred pharmacy: [facility] [phone] [phone]    Comment:  Requesting early because of holidays. Due to be refilled on 12/26. Apparently there is a refill on file for Xanax and Klonopin so no need for that.  Thank you and Happy Holidays

## 2017-12-22 NOTE — TELEPHONE ENCOUNTER
Requested Prescriptions     Pending Prescriptions Disp Refills    amphetamine-dextroamphetamine (ADDERALL, 30MG,) 30 MG tablet 60 tablet 0     Sig: Take 1 tablet by mouth 2 times daily .  Earliest Fill Date: 12/22/17     Last OV - 11.17.17  Next OV - 2.16.18  Last filled - 10.31.17 #60

## 2017-12-27 RX ORDER — NADOLOL 20 MG/1
TABLET ORAL
Qty: 90 TABLET | Refills: 1 | Status: SHIPPED | OUTPATIENT
Start: 2017-12-27 | End: 2018-06-29 | Stop reason: SDUPTHER

## 2018-01-22 ENCOUNTER — PATIENT MESSAGE (OUTPATIENT)
Dept: FAMILY MEDICINE CLINIC | Age: 48
End: 2018-01-22

## 2018-01-22 NOTE — TELEPHONE ENCOUNTER
From: Ran Ruffin  Sent: 1/22/2018 10:58 AM EST  Subject: Medication Renewal Request    Norma Rasmussen would like a refill of the following medications:  amphetamine-dextroamphetamine (ADDERALL, 30MG,) 30 MG tablet Ayden Pemberton MD]    Preferred pharmacy: [address] [phone] - F [phone]    Comment:      Medication renewals requested in this message routed separately:  ALPRAZolam Phyliss Rubinstein) 2 MG tablet Deneen Mitchell MD]

## 2018-01-22 NOTE — TELEPHONE ENCOUNTER
From: Elver Rodriguez  To: Meme Marroquin MD  Sent: 1/22/2018 11:05 AM EST  Subject: Prescription Question    Hi Dr Christi Kaplan,  The prescriptions from Dr Robbie Okeefe for Klonopin ran out last month. We discussed you taking that prescription over so that one physician is managing the benzodiazepines. Will you please send a script to [facility]? My prescription has been Klonopin 1 mg take 2 at night. I see Dr Robbie Okeefe on Wednesday for follow up if there so something you would like me to discuss with him. I don't expect anything to change. While talking prescriptions, the system had Klonopin in it but I didn't see it when I went to request refills. Also, it has nadalol listed twice. I'm not sure why it has two but one can be deleted.      Thank you,  Larence Baumgarten

## 2018-01-22 NOTE — TELEPHONE ENCOUNTER
From: Elsy Engel  Sent: 1/22/2018 10:58 AM EST  Subject: Medication Renewal Request    Kylegarcia Lundberg would like a refill of the following medications:  ALPRAZolam Jalen Talia) 2 MG tablet Parag Elias MD]    Preferred pharmacy: [facility] [address] [phone] -  [phone]    Comment:      Medication renewals requested in this message routed separately:  amphetamine-dextroamphetamine (ADDERALL, 30MG,) 30 MG tablet Corinne Hark, MD]

## 2018-01-23 RX ORDER — DEXTROAMPHETAMINE SACCHARATE, AMPHETAMINE ASPARTATE, DEXTROAMPHETAMINE SULFATE AND AMPHETAMINE SULFATE 7.5; 7.5; 7.5; 7.5 MG/1; MG/1; MG/1; MG/1
30 TABLET ORAL 2 TIMES DAILY
Qty: 60 TABLET | Refills: 0 | Status: SHIPPED | OUTPATIENT
Start: 2018-01-23 | End: 2018-02-16 | Stop reason: SDUPTHER

## 2018-01-23 RX ORDER — ALPRAZOLAM 2 MG/1
2 TABLET ORAL 2 TIMES DAILY PRN
Qty: 45 TABLET | Refills: 2 | Status: SHIPPED | OUTPATIENT
Start: 2018-01-23 | End: 2018-04-18 | Stop reason: SDUPTHER

## 2018-01-25 RX ORDER — CLONAZEPAM 1 MG/1
2 TABLET ORAL NIGHTLY PRN
Qty: 60 TABLET | Refills: 2 | Status: SHIPPED | OUTPATIENT
Start: 2018-01-25 | End: 2018-04-18 | Stop reason: SDUPTHER

## 2018-02-14 ENCOUNTER — HOSPITAL ENCOUNTER (OUTPATIENT)
Dept: OTHER | Age: 48
Discharge: HOME | End: 2018-02-14
Attending: INTERNAL MEDICINE | Admitting: INTERNAL MEDICINE

## 2018-02-14 LAB
BASOPHILS ABSOLUTE: 0 K/UL (ref 0–0.2)
BASOPHILS RELATIVE PERCENT: 0.5 %
EOSINOPHILS ABSOLUTE: 0.1 K/UL (ref 0–0.6)
EOSINOPHILS RELATIVE PERCENT: 2.6 %
HCT VFR BLD CALC: 45 % (ref 40.5–52.5)
HEMOGLOBIN: 15.1 G/DL (ref 13.5–17.5)
LYMPHOCYTES ABSOLUTE: 1.8 K/UL (ref 1–5.1)
LYMPHOCYTES RELATIVE PERCENT: 39.5 %
MCH RBC QN AUTO: 28.3 PG (ref 26–34)
MCHC RBC AUTO-ENTMCNC: 33.5 G/DL (ref 31–36)
MCV RBC AUTO: 84.6 FL (ref 80–100)
MONOCYTES ABSOLUTE: 0.5 K/UL (ref 0–1.3)
MONOCYTES RELATIVE PERCENT: 11 %
NEUTROPHILS ABSOLUTE: 2.1 K/UL (ref 1.7–7.7)
NEUTROPHILS RELATIVE PERCENT: 46.4 %
PDW BLD-RTO: 13 % (ref 12.4–15.4)
PLATELET # BLD: 181 K/UL (ref 135–450)
PMV BLD AUTO: 7.9 FL (ref 5–10.5)
RBC # BLD: 5.31 M/UL (ref 4.2–5.9)
WBC # BLD: 4.6 K/UL (ref 4–11)

## 2018-02-16 ENCOUNTER — OFFICE VISIT (OUTPATIENT)
Dept: FAMILY MEDICINE CLINIC | Age: 48
End: 2018-02-16

## 2018-02-16 VITALS
HEART RATE: 73 BPM | OXYGEN SATURATION: 97 % | SYSTOLIC BLOOD PRESSURE: 110 MMHG | WEIGHT: 231 LBS | DIASTOLIC BLOOD PRESSURE: 68 MMHG | BODY MASS INDEX: 34.1 KG/M2

## 2018-02-16 DIAGNOSIS — R42 DIZZINESS: ICD-10-CM

## 2018-02-16 DIAGNOSIS — F41.9 ANXIETY: Primary | ICD-10-CM

## 2018-02-16 DIAGNOSIS — I10 BENIGN HYPERTENSION: ICD-10-CM

## 2018-02-16 PROCEDURE — G8427 DOCREV CUR MEDS BY ELIG CLIN: HCPCS | Performed by: FAMILY MEDICINE

## 2018-02-16 PROCEDURE — G8417 CALC BMI ABV UP PARAM F/U: HCPCS | Performed by: FAMILY MEDICINE

## 2018-02-16 PROCEDURE — G8484 FLU IMMUNIZE NO ADMIN: HCPCS | Performed by: FAMILY MEDICINE

## 2018-02-16 PROCEDURE — 1036F TOBACCO NON-USER: CPT | Performed by: FAMILY MEDICINE

## 2018-02-16 PROCEDURE — 99214 OFFICE O/P EST MOD 30 MIN: CPT | Performed by: FAMILY MEDICINE

## 2018-02-16 RX ORDER — DEXTROAMPHETAMINE SACCHARATE, AMPHETAMINE ASPARTATE, DEXTROAMPHETAMINE SULFATE AND AMPHETAMINE SULFATE 7.5; 7.5; 7.5; 7.5 MG/1; MG/1; MG/1; MG/1
30 TABLET ORAL 2 TIMES DAILY
Qty: 60 TABLET | Refills: 0 | Status: SHIPPED | OUTPATIENT
Start: 2018-02-16 | End: 2018-03-19 | Stop reason: SDUPTHER

## 2018-02-16 NOTE — PROGRESS NOTES
current medical regimen is effective;  continue present plan and medications. Other orders  -     amphetamine-dextroamphetamine (ADDERALL, 30MG,) 30 MG tablet; Take 1 tablet by mouth 2 times daily for 30 days DO not fill until 12/26/17. Padmini Mittal

## 2018-03-05 RX ORDER — FLUTICASONE PROPIONATE 50 MCG
2 SPRAY, SUSPENSION (ML) NASAL DAILY
Qty: 1 BOTTLE | Refills: 12 | Status: SHIPPED | OUTPATIENT
Start: 2018-03-05 | End: 2019-03-22 | Stop reason: SDUPTHER

## 2018-03-05 NOTE — TELEPHONE ENCOUNTER
Last Fill 3/8/17  Last Office Visit 2/16/18   Return in about 3 months (around 5/16/2018).    Pending Appointments 5/16/18 & 8/15/18

## 2018-03-19 NOTE — TELEPHONE ENCOUNTER
From: Rupali Setting  Sent: 3/19/2018 2:41 PM EDT  Subject: Medication Renewal Request    Megmaddie Chidi would like a refill of the following medications:     amphetamine-dextroamphetamine (ADDERALL, 30MG,) 30 MG tablet Marilynn Phillip MD]    Preferred pharmacy: [address] Kervin Duran [facility] [phone] - F [phone]    Comment:  Koby French has refills for the other 2. Please refill this to be filled on Thursday 3/22.  Thank you, Avinash Mendoza

## 2018-03-19 NOTE — TELEPHONE ENCOUNTER
Last Fill 2/16/18  Last Office Visit 2/16/18   Return in about 3 months (around 5/16/2018).    Pending Appointments 5/16/18 & 8/15/18

## 2018-03-20 RX ORDER — DEXTROAMPHETAMINE SACCHARATE, AMPHETAMINE ASPARTATE, DEXTROAMPHETAMINE SULFATE AND AMPHETAMINE SULFATE 7.5; 7.5; 7.5; 7.5 MG/1; MG/1; MG/1; MG/1
30 TABLET ORAL 2 TIMES DAILY
Qty: 60 TABLET | Refills: 0 | Status: SHIPPED | OUTPATIENT
Start: 2018-03-20 | End: 2018-04-18 | Stop reason: SDUPTHER

## 2018-04-02 ENCOUNTER — OFFICE VISIT (OUTPATIENT)
Dept: DERMATOLOGY | Age: 48
End: 2018-04-02

## 2018-04-02 DIAGNOSIS — L21.9 SEBORRHEIC DERMATITIS: ICD-10-CM

## 2018-04-02 DIAGNOSIS — D22.9 BENIGN NEVUS: ICD-10-CM

## 2018-04-02 DIAGNOSIS — L72.0 EPIDERMAL CYST: ICD-10-CM

## 2018-04-02 DIAGNOSIS — L91.8 SKIN TAG: ICD-10-CM

## 2018-04-02 DIAGNOSIS — L71.9 ROSACEA: Primary | ICD-10-CM

## 2018-04-02 PROCEDURE — 1036F TOBACCO NON-USER: CPT | Performed by: DERMATOLOGY

## 2018-04-02 PROCEDURE — 99214 OFFICE O/P EST MOD 30 MIN: CPT | Performed by: DERMATOLOGY

## 2018-04-02 PROCEDURE — G8417 CALC BMI ABV UP PARAM F/U: HCPCS | Performed by: DERMATOLOGY

## 2018-04-02 PROCEDURE — G8427 DOCREV CUR MEDS BY ELIG CLIN: HCPCS | Performed by: DERMATOLOGY

## 2018-04-02 RX ORDER — KETOCONAZOLE 20 MG/ML
SHAMPOO TOPICAL
Qty: 120 ML | Refills: 11 | Status: SHIPPED | OUTPATIENT
Start: 2018-04-02 | End: 2019-04-17 | Stop reason: SDUPTHER

## 2018-04-18 RX ORDER — DEXTROAMPHETAMINE SACCHARATE, AMPHETAMINE ASPARTATE, DEXTROAMPHETAMINE SULFATE AND AMPHETAMINE SULFATE 7.5; 7.5; 7.5; 7.5 MG/1; MG/1; MG/1; MG/1
30 TABLET ORAL 2 TIMES DAILY
Qty: 60 TABLET | Refills: 0 | Status: SHIPPED | OUTPATIENT
Start: 2018-04-18 | End: 2018-05-16 | Stop reason: SDUPTHER

## 2018-04-18 RX ORDER — CLONAZEPAM 1 MG/1
2 TABLET ORAL NIGHTLY PRN
Qty: 60 TABLET | Refills: 0 | Status: SHIPPED | OUTPATIENT
Start: 2018-04-18 | End: 2018-05-16 | Stop reason: SDUPTHER

## 2018-04-18 RX ORDER — ALPRAZOLAM 2 MG/1
2 TABLET ORAL 2 TIMES DAILY PRN
Qty: 45 TABLET | Refills: 0 | Status: SHIPPED | OUTPATIENT
Start: 2018-04-18 | End: 2018-05-16 | Stop reason: SDUPTHER

## 2018-04-24 ENCOUNTER — OFFICE VISIT (OUTPATIENT)
Dept: FAMILY MEDICINE CLINIC | Age: 48
End: 2018-04-24

## 2018-04-24 ENCOUNTER — TELEPHONE (OUTPATIENT)
Dept: FAMILY MEDICINE CLINIC | Age: 48
End: 2018-04-24

## 2018-04-24 VITALS
BODY MASS INDEX: 34.36 KG/M2 | TEMPERATURE: 98.7 F | DIASTOLIC BLOOD PRESSURE: 86 MMHG | WEIGHT: 232 LBS | SYSTOLIC BLOOD PRESSURE: 120 MMHG | HEART RATE: 82 BPM | OXYGEN SATURATION: 97 % | HEIGHT: 69 IN

## 2018-04-24 DIAGNOSIS — J02.9 SORE THROAT: Primary | ICD-10-CM

## 2018-04-24 LAB — S PYO AG THROAT QL: NORMAL

## 2018-04-24 PROCEDURE — 87880 STREP A ASSAY W/OPTIC: CPT | Performed by: FAMILY MEDICINE

## 2018-04-24 PROCEDURE — G8427 DOCREV CUR MEDS BY ELIG CLIN: HCPCS | Performed by: FAMILY MEDICINE

## 2018-04-24 PROCEDURE — 1036F TOBACCO NON-USER: CPT | Performed by: FAMILY MEDICINE

## 2018-04-24 PROCEDURE — G8417 CALC BMI ABV UP PARAM F/U: HCPCS | Performed by: FAMILY MEDICINE

## 2018-04-24 PROCEDURE — 99213 OFFICE O/P EST LOW 20 MIN: CPT | Performed by: FAMILY MEDICINE

## 2018-04-24 RX ORDER — AMOXICILLIN AND CLAVULANATE POTASSIUM 875; 125 MG/1; MG/1
1 TABLET, FILM COATED ORAL 2 TIMES DAILY
Qty: 20 TABLET | Refills: 0 | Status: SHIPPED | OUTPATIENT
Start: 2018-04-24 | End: 2018-05-04

## 2018-05-16 ENCOUNTER — OFFICE VISIT (OUTPATIENT)
Dept: FAMILY MEDICINE CLINIC | Age: 48
End: 2018-05-16

## 2018-05-16 VITALS
BODY MASS INDEX: 33.71 KG/M2 | WEIGHT: 227.6 LBS | HEART RATE: 79 BPM | OXYGEN SATURATION: 98 % | HEIGHT: 69 IN | DIASTOLIC BLOOD PRESSURE: 84 MMHG | SYSTOLIC BLOOD PRESSURE: 118 MMHG

## 2018-05-16 DIAGNOSIS — F90.0 ATTENTION DEFICIT HYPERACTIVITY DISORDER (ADHD), PREDOMINANTLY INATTENTIVE TYPE: ICD-10-CM

## 2018-05-16 DIAGNOSIS — F51.01 PRIMARY INSOMNIA: ICD-10-CM

## 2018-05-16 DIAGNOSIS — F41.9 ANXIETY: Primary | ICD-10-CM
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
## 2018-05-16 DIAGNOSIS — Z79.899 MEDICATION MANAGEMENT: ICD-10-CM

## 2018-05-16 PROCEDURE — 99214 OFFICE O/P EST MOD 30 MIN: CPT | Performed by: FAMILY MEDICINE

## 2018-05-16 PROCEDURE — G8427 DOCREV CUR MEDS BY ELIG CLIN: HCPCS | Performed by: FAMILY MEDICINE

## 2018-05-16 PROCEDURE — G8417 CALC BMI ABV UP PARAM F/U: HCPCS | Performed by: FAMILY MEDICINE

## 2018-05-16 PROCEDURE — 1036F TOBACCO NON-USER: CPT | Performed by: FAMILY MEDICINE

## 2018-05-16 RX ORDER — DEXTROAMPHETAMINE SACCHARATE, AMPHETAMINE ASPARTATE, DEXTROAMPHETAMINE SULFATE AND AMPHETAMINE SULFATE 7.5; 7.5; 7.5; 7.5 MG/1; MG/1; MG/1; MG/1
30 TABLET ORAL 2 TIMES DAILY
Qty: 60 TABLET | Refills: 0 | Status: SHIPPED | OUTPATIENT
Start: 2018-05-16 | End: 2018-06-12 | Stop reason: SDUPTHER

## 2018-05-16 RX ORDER — CLONAZEPAM 1 MG/1
2 TABLET ORAL NIGHTLY PRN
Qty: 60 TABLET | Refills: 2 | Status: SHIPPED | OUTPATIENT
Start: 2018-05-16 | End: 2018-08-15 | Stop reason: SDUPTHER

## 2018-05-16 RX ORDER — ALPRAZOLAM 2 MG/1
2 TABLET ORAL 2 TIMES DAILY PRN
Qty: 45 TABLET | Refills: 2 | Status: SHIPPED | OUTPATIENT
Start: 2018-05-16 | End: 2018-08-15 | Stop reason: SDUPTHER

## 2018-05-16 ASSESSMENT — ENCOUNTER SYMPTOMS: RESPIRATORY NEGATIVE: 1

## 2018-05-20 LAB
6-ACETYLMORPHINE: NOT DETECTED
7-AMINOCLONAZEPAM: PRESENT
ALPHA-OH-ALPRAZOLAM: NOT DETECTED
ALPRAZOLAM: NOT DETECTED
AMPHETAMINE: PRESENT
BARBITURATES: NOT DETECTED
BENZOYLECGONINE: NOT DETECTED
BUPRENORPHINE: NOT DETECTED
CARISOPRODOL: NOT DETECTED
CLONAZEPAM: NOT DETECTED
CODEINE: NOT DETECTED
CREATININE URINE: 252 MG/DL (ref 20–400)
DIAZEPAM: NOT DETECTED
DRUGS EXPECTED: NORMAL
EER PAIN MGT DRUG PANEL, HIGH RES/EMIT U: NORMAL
ETHYL GLUCURONIDE: NOT DETECTED
FENTANYL: NOT DETECTED
HYDROCODONE: NOT DETECTED
HYDROMORPHONE: NOT DETECTED
LORAZEPAM: NOT DETECTED
MARIJUANA METABOLITE: NOT DETECTED
MDA: NOT DETECTED
MDEA: NOT DETECTED
MDMA URINE: NOT DETECTED
MEPERIDINE: NOT DETECTED
METHADONE: NOT DETECTED
METHAMPHETAMINE: NOT DETECTED
METHYLPHENIDATE: NOT DETECTED
MIDAZOLAM: NOT DETECTED
MORPHINE: NOT DETECTED
NORBUPRENORPHINE, FREE: NOT DETECTED
NORDIAZEPAM: NOT DETECTED
NORFENTANYL: NOT DETECTED
NORHYDROCODONE, URINE: NOT DETECTED
NOROXYCODONE: NOT DETECTED
NOROXYMORPHONE, URINE: PRESENT
OXAZEPAM: NOT DETECTED
OXYCODONE: NOT DETECTED
OXYMORPHONE: PRESENT
PAIN MANAGEMENT DRUG PANEL: NORMAL
PAIN MANAGEMENT DRUG PANEL: NORMAL
PCP: NOT DETECTED
PHENTERMINE: NOT DETECTED
PROPOXYPHENE: NOT DETECTED
TAPENTADOL, URINE: NOT DETECTED
TAPENTADOL-O-SULFATE, URINE: NOT DETECTED
TEMAZEPAM: NOT DETECTED
TRAMADOL: NOT DETECTED
ZOLPIDEM: NOT DETECTED

## 2018-05-21 ENCOUNTER — TELEPHONE (OUTPATIENT)
Dept: CARDIOLOGY CLINIC | Age: 48
End: 2018-05-21

## 2018-05-22 DIAGNOSIS — I34.0 MITRAL VALVE INSUFFICIENCY, UNSPECIFIED ETIOLOGY: Primary | ICD-10-CM

## 2018-05-29 ENCOUNTER — HOSPITAL ENCOUNTER (OUTPATIENT)
Dept: VASCULAR LAB | Age: 48
Discharge: HOME | End: 2018-05-29
Attending: FAMILY MEDICINE | Admitting: FAMILY MEDICINE

## 2018-05-29 DIAGNOSIS — R42 DIZZINESS AND GIDDINESS: ICD-10-CM

## 2018-06-11 ENCOUNTER — OFFICE VISIT (OUTPATIENT)
Dept: ORTHOPEDIC SURGERY | Age: 48
End: 2018-06-11

## 2018-06-11 VITALS
DIASTOLIC BLOOD PRESSURE: 77 MMHG | BODY MASS INDEX: 33.7 KG/M2 | SYSTOLIC BLOOD PRESSURE: 131 MMHG | HEIGHT: 69 IN | WEIGHT: 227.51 LBS | HEART RATE: 60 BPM

## 2018-06-11 DIAGNOSIS — M54.2 NECK PAIN: Primary | ICD-10-CM

## 2018-06-11 DIAGNOSIS — M48.02 FORAMINAL STENOSIS OF CERVICAL REGION: ICD-10-CM

## 2018-06-11 DIAGNOSIS — M54.12 CERVICAL RADICULITIS: ICD-10-CM

## 2018-06-11 PROCEDURE — 1036F TOBACCO NON-USER: CPT | Performed by: PHYSICIAN ASSISTANT

## 2018-06-11 PROCEDURE — G8417 CALC BMI ABV UP PARAM F/U: HCPCS | Performed by: PHYSICIAN ASSISTANT

## 2018-06-11 PROCEDURE — 99214 OFFICE O/P EST MOD 30 MIN: CPT | Performed by: PHYSICIAN ASSISTANT

## 2018-06-11 PROCEDURE — G8427 DOCREV CUR MEDS BY ELIG CLIN: HCPCS | Performed by: PHYSICIAN ASSISTANT

## 2018-06-12 DIAGNOSIS — F98.8 ATTENTION DEFICIT DISORDER (ADD) WITHOUT HYPERACTIVITY: Primary | ICD-10-CM

## 2018-06-13 ENCOUNTER — OFFICE VISIT (OUTPATIENT)
Dept: ORTHOPEDIC SURGERY | Age: 48
End: 2018-06-13

## 2018-06-13 VITALS
BODY MASS INDEX: 33.33 KG/M2 | SYSTOLIC BLOOD PRESSURE: 104 MMHG | HEART RATE: 60 BPM | HEIGHT: 69 IN | WEIGHT: 225 LBS | DIASTOLIC BLOOD PRESSURE: 79 MMHG

## 2018-06-13 DIAGNOSIS — M50.30 DDD (DEGENERATIVE DISC DISEASE), CERVICAL: ICD-10-CM

## 2018-06-13 DIAGNOSIS — M75.111 PARTIAL TEAR OF RIGHT ROTATOR CUFF: Primary | ICD-10-CM

## 2018-06-13 DIAGNOSIS — M75.01 ADHESIVE CAPSULITIS OF RIGHT SHOULDER: ICD-10-CM

## 2018-06-13 PROCEDURE — G8427 DOCREV CUR MEDS BY ELIG CLIN: HCPCS | Performed by: ORTHOPAEDIC SURGERY

## 2018-06-13 PROCEDURE — 99214 OFFICE O/P EST MOD 30 MIN: CPT | Performed by: ORTHOPAEDIC SURGERY

## 2018-06-13 PROCEDURE — G8417 CALC BMI ABV UP PARAM F/U: HCPCS | Performed by: ORTHOPAEDIC SURGERY

## 2018-06-13 PROCEDURE — 1036F TOBACCO NON-USER: CPT | Performed by: ORTHOPAEDIC SURGERY

## 2018-06-13 RX ORDER — DEXTROAMPHETAMINE SACCHARATE, AMPHETAMINE ASPARTATE, DEXTROAMPHETAMINE SULFATE AND AMPHETAMINE SULFATE 7.5; 7.5; 7.5; 7.5 MG/1; MG/1; MG/1; MG/1
30 TABLET ORAL 2 TIMES DAILY
Qty: 60 TABLET | Refills: 0 | Status: SHIPPED | OUTPATIENT
Start: 2018-06-13 | End: 2018-07-11 | Stop reason: SDUPTHER

## 2018-06-13 ASSESSMENT — ENCOUNTER SYMPTOMS
BACK PAIN: 1
ROS SKIN COMMENTS: FACIAL ROSACEA

## 2018-06-26 ENCOUNTER — PAT TELEPHONE (OUTPATIENT)
Dept: PREADMISSION TESTING | Age: 48
End: 2018-06-26

## 2018-06-29 RX ORDER — NADOLOL 20 MG/1
TABLET ORAL
Qty: 90 TABLET | Refills: 3 | Status: SHIPPED | OUTPATIENT
Start: 2018-06-29 | End: 2019-06-10 | Stop reason: SDUPTHER

## 2018-07-02 ENCOUNTER — TELEPHONE (OUTPATIENT)
Dept: ORTHOPEDIC SURGERY | Age: 48
End: 2018-07-02

## 2018-07-02 ENCOUNTER — HOSPITAL ENCOUNTER (OUTPATIENT)
Dept: PAIN MANAGEMENT | Age: 48
Discharge: HOME | End: 2018-07-02
Attending: PHYSICAL MEDICINE & REHABILITATION | Admitting: PHYSICAL MEDICINE & REHABILITATION

## 2018-07-02 VITALS
DIASTOLIC BLOOD PRESSURE: 71 MMHG | HEIGHT: 69 IN | SYSTOLIC BLOOD PRESSURE: 133 MMHG | RESPIRATION RATE: 16 BRPM | OXYGEN SATURATION: 97 % | WEIGHT: 225 LBS | BODY MASS INDEX: 33.33 KG/M2 | TEMPERATURE: 96.8 F | HEART RATE: 67 BPM

## 2018-07-02 RX ORDER — LIDOCAINE HYDROCHLORIDE 10 MG/ML
INJECTION, SOLUTION EPIDURAL; INFILTRATION; INTRACAUDAL; PERINEURAL
Status: DISPENSED
Start: 2018-07-02 | End: 2018-07-02

## 2018-07-02 RX ORDER — LIDOCAINE HYDROCHLORIDE 10 MG/ML
0.1 INJECTION, SOLUTION INFILTRATION; PERINEURAL ONCE
Status: DISCONTINUED | OUTPATIENT
Start: 2018-07-02 | End: 2018-07-03 | Stop reason: HOSPADM

## 2018-07-02 RX ORDER — SODIUM CHLORIDE, SODIUM LACTATE, POTASSIUM CHLORIDE, CALCIUM CHLORIDE 600; 310; 30; 20 MG/100ML; MG/100ML; MG/100ML; MG/100ML
INJECTION, SOLUTION INTRAVENOUS CONTINUOUS
Status: DISCONTINUED | OUTPATIENT
Start: 2018-07-02 | End: 2018-07-03 | Stop reason: HOSPADM

## 2018-07-02 ASSESSMENT — ACTIVITIES OF DAILY LIVING (ADL): EFFECT OF PAIN ON DAILY ACTIVITIES: ACTIVITY

## 2018-07-02 ASSESSMENT — PAIN DESCRIPTION - DESCRIPTORS: DESCRIPTORS: ACHING

## 2018-07-02 ASSESSMENT — PAIN - FUNCTIONAL ASSESSMENT: PAIN_FUNCTIONAL_ASSESSMENT: 0-10

## 2018-07-02 NOTE — OP NOTE
Patient:  Amadou [facility]   Medical Record #:  [id number]   Date:  7/2/2018  Physician:  Lasha Conway M.D. Facility: [facility]     Pre-op diagnosis:  Cervical radiculitis, cervical spondylosis, cervical stenosis  Post-op diagnosis:  same  Procedure: Right C6 7 cervical interlaminar epidural injection #2 with flouroscopic guidance  Anesthesia: Conscious sedation with 2mg Versed & 50 µg fentanyl    Procedure Note:    The patient was admitted through pre-op and written consent was obtained. The patient was advised of the risks and benefits of the procedure, including but not limited to the following: bleeding, pain, infection, temporary paralysis, nerve damage and spinal headache. The patient was given the opportunity to ask questions. There were no contraindications for this procedure. The appropriate area was prepped and draped in a sterile fashion. Landmarks were identified and marked. The skin and soft tissues were anesthetized with 1% lidocaine. A 22G 3.5inch Touhy needle was advanced to the right C6 7 interlaminar space using fluoroscopic guidance confirmed by multiple views showing appropriate needle placement. Injection of contrast showed epidural flow. There were no signs of intravascular or intrathecal injection. 10 mg dexamethasone and 2 mL normal saline solution were then injected. 1/3 of the solution was injected secondary to pain    There were no complications and the patient tolerated the procedure well. The patient was transferred to the recovery area and monitored. Discharge instructions were given. The patient is to contact me for any post-procedure concerns. The patient is to follow up as scheduled.       Lasha Conway MD

## 2018-07-02 NOTE — H&P
HISTORY AND PHYSICAL/PRE-SEDATION ASSESSMENT    Patient:  Herbert Mosley   :  1970  Medical Record No.:  [id number]   Date:  2018  Physician:  Tatiana Dooley M.D. Facility: [facility]     Nursing History and Physical reviewed and agreed upon. Additional findings:    Allergies:  Ambien [zolpidem]; Augmentin [amoxicillin-pot clavulanate]; Elavil [amitriptyline]; Neurontin [gabapentin]; Topamax; and Tramadol    Home Medications:    Prior to Admission medications    Medication Sig Start Date End Date Taking? Authorizing Provider   nadolol (CORGARD) 20 MG tablet TAKE ONE TABLET BY MOUTH DAILY 18   Maria Isabel Herron MD   amphetamine-dextroamphetamine (ADDERALL, 30MG,) 30 MG tablet Take 1 tablet by mouth 2 times daily for 30 days. . 18  Tiago Dugan MD   clonazePAM (KLONOPIN) 1 MG tablet Take 2 tablets by mouth nightly as needed (sleep) for up to 30 days. . 5/16/18 6/15/18  Tiago Dugan MD   ketoconazole (NIZORAL) 2 % shampoo Wash as needed 18   Sandy Cullen MD   metroNIDAZOLE (METROCREAM) 0.75 % cream Apply to face BID 18   Sandy Cullen MD   fluticasone Ceola Riedel) 50 MCG/ACT nasal spray 2 sprays by Nasal route daily 3/5/18   Tiago Dugan MD   vitamin D (ERGOCALCIFEROL) 54819 units CAPS capsule TAKE TWO CAPSULES BY MOUTH ONCE WEEKLY  Patient taking differently: TAKE ONE CAPSULE BY MOUTH ONCE WEEKLY 17   Toro Larson MD   Multiple Vitamin (DAILY-SHANNON) TABS TAKE ONE TABLET BY MOUTH DAILY 17   RENO Sanchez CNP   lisinopril (PRINIVIL;ZESTRIL) 20 MG tablet Take 1 tablet by mouth daily 17   Maria Isabel Herron MD   omega-3 acid ethyl esters (LOVAZA) 1 g capsule Take 2 capsules by mouth 2 times daily 17   RENO Sanchez CNP   Testosterone (AXIRON) 30 MG/ACT SOLN Place 90 mg onto the skin daily 17   RENO Sanchez - CNP   triamcinolone (KENALOG) 0.025 % cream APPLY TO AFFECTED AREA(S) DAILY AS NEEDED 6/16/17   lVad Babb MD   5-Hydroxytryptophan (5-HTP) 50 MG CAPS Take 1 capsule by mouth every morning (before breakfast) 2/15/17   Corinne Clap, MD   pimecrolimus (ELIDEL) 1 % cream Apply topically 2 times daily. 2/1/17   Megan Bradshaw MD   Melatonin 2.5 MG CAPS Take 5 mg by mouth     Historical Provider, MD   Naloxegol Oxalate (MOVANTIK PO) Take by mouth    Historical Provider, MD   oxymorphone (OPANA ER) 40 MG ER tablet Take 40 mg by mouth 4 times daily     Historical Provider, MD   polyethylene glycol (GLYCOLAX) packet Take 17 g by mouth daily as needed. Historical Provider, MD   Syringe/Needle, Disp, (SYRINGE 3CC/25GX1\") 25G X 1\" 3 ML MISC 1 each by Does not apply route every 7 days. To inject Testosterone 1/6/14 4/28/17  Corinne Clap, MD   Amylase-Lipase-Protease (CREON 20 PO) Take 20 mg by mouth three times daily. Historical Provider, MD   omeprazole (PRILOSEC) 20 MG capsule Take 20 mg by mouth 2 times daily. Historical Provider, MD   oxycodone (OXY-IR) 30 MG immediate release tablet Take 30 mg by mouth 4 times daily  5/3/10   Historical Provider, MD       Vitals: Stable       PHYSICAL EXAM:  HENT: Airway patent and reviewed  Cardiovascular: Normal rate, regular rhythm, normal heart sounds. Pulmonary/Chest: No wheezes. No rhonchi. No rales. Abdominal: Soft. Bowel sounds are normal. No distension. ASA CLASS:         []   I. Normal, healthy adult           [x]   II.  Mild systemic disease            []   III. Severe systemic disease      Sedation plan:   [x]  Local              [x]  Minimal                  []  General anesthesia    Patient's condition acceptable for planned procedure/sedation. Post Procedure Plan   Return to same level of care   ______________________     The risks and benefits as well as alternatives to the procedure have been discussed with the patient and or family. The patient and or next of kin understands and agrees to proceed.     Princess Tao

## 2018-07-02 NOTE — PROGRESS NOTES
NURSING CARE PLANS FOLLOWED     · Potential for anxiety-decrease anxiety-allow patient to verbalize  · Potential for infection-no infection-proper infection controls  · Potential for fall the patient will move to fall risk after procedure- through the recovery  phase   · Shobonier to environment  · Call light in reach  · Instruct to call for assistance prior to getting up  · Non skid footwear on   · Bed wheels locked and bed in lowest position - siderails up times two  · Potential for deep sedation-no deep sedation-know maximum allowable dose         adverse reactions and nursing considerations.  Assess VS and LOC

## 2018-07-02 NOTE — PROGRESS NOTES
Omnipaque  240mg/ml      2ml   DepoMedrol 0 mg  Lidocaine 1% 2ml  Marcaine 0  Celestone  0  mg  Kenalog  0 mg  Dexamethazone 10mg - 1ml  Normal saile - 2ml

## 2018-07-11 DIAGNOSIS — F98.8 ATTENTION DEFICIT DISORDER (ADD) WITHOUT HYPERACTIVITY: ICD-10-CM

## 2018-07-11 RX ORDER — DEXTROAMPHETAMINE SACCHARATE, AMPHETAMINE ASPARTATE, DEXTROAMPHETAMINE SULFATE AND AMPHETAMINE SULFATE 7.5; 7.5; 7.5; 7.5 MG/1; MG/1; MG/1; MG/1
30 TABLET ORAL 2 TIMES DAILY
Qty: 60 TABLET | Refills: 0 | Status: SHIPPED | OUTPATIENT
Start: 2018-07-11 | End: 2018-08-09 | Stop reason: SDUPTHER

## 2018-07-11 NOTE — TELEPHONE ENCOUNTER
From: Paul Lu  Sent: 7/11/2018 1:18 PM EDT  Subject: Medication Renewal Request    Angel Estevez would like a refill of the following medications:     amphetamine-dextroamphetamine (ADDERALL, 30MG,) 30 MG tablet Tiago Dugan MD]    [facility] [phone] [facility] [phone]    Comment:

## 2018-07-16 ENCOUNTER — OFFICE VISIT (OUTPATIENT)
Dept: ORTHOPEDIC SURGERY | Age: 48
End: 2018-07-16

## 2018-07-16 VITALS
SYSTOLIC BLOOD PRESSURE: 135 MMHG | DIASTOLIC BLOOD PRESSURE: 79 MMHG | HEART RATE: 80 BPM | BODY MASS INDEX: 44.57 KG/M2 | WEIGHT: 227 LBS | HEIGHT: 60 IN

## 2018-07-16 DIAGNOSIS — M54.12 CERVICAL RADICULITIS: ICD-10-CM

## 2018-07-16 DIAGNOSIS — M50.30 DDD (DEGENERATIVE DISC DISEASE), CERVICAL: Primary | ICD-10-CM

## 2018-07-16 DIAGNOSIS — M48.02 FORAMINAL STENOSIS OF CERVICAL REGION: ICD-10-CM

## 2018-07-16 PROCEDURE — G8427 DOCREV CUR MEDS BY ELIG CLIN: HCPCS | Performed by: PHYSICIAN ASSISTANT

## 2018-07-16 PROCEDURE — 99213 OFFICE O/P EST LOW 20 MIN: CPT | Performed by: PHYSICIAN ASSISTANT

## 2018-07-16 PROCEDURE — G8417 CALC BMI ABV UP PARAM F/U: HCPCS | Performed by: PHYSICIAN ASSISTANT

## 2018-07-16 PROCEDURE — 1036F TOBACCO NON-USER: CPT | Performed by: PHYSICIAN ASSISTANT

## 2018-07-16 NOTE — PROGRESS NOTES
Follow up Injection: SPINE    CHIEF COMPLAINT:    Chief Complaint   Patient presents with    Neck Pain     F/U GLORIA injection       HISTORY OF PRESENT ILLNESS:                The patient is a 50 y.o. male here to follow up after right C6 7 LILIA #2 from 7-2018 for acute/chronic recurrent 2-3 month history of aching/stabbing right-sided neck/scapular pain extending into the triceps forearm to the hand with numbness. Symptoms worse with cervical range of motion and aggravated with traction. Relief with stretching. Overall reports 70% relief with recent GLORIA. Other conservative care includes LILIA , NSAIDs, chiropractics, on Opana chronically for pancreatitis. He denies any progressive numbness tingling or weakness. No fine motor difficulty or gait instability. No side effects to the procedure aside from some injection site soreness which has resolved. Also seeing a shoulder specialists for right chronic underlying shoulder pain      Pain Assessment  Location of Pain: Neck  Severity of Pain: 1  Quality of Pain: Sharp, Dull, Aching  Duration of Pain: Persistent  Frequency of Pain: Constant  Aggravating Factors: Stairs, Walking, Standing, Squatting, Kneeling, Exercise, Straightening, Stretching, Bending  Limiting Behavior: Yes  Relieving Factors: Rest  Result of Injury: No  Work-Related Injury: No  Are there other pain locations you wish to document?: No      The post injection form was reviewed & scanned into the medical record today. Past/Current Treatment:   PT: Yes w/aggravation w/traction   Chiro: no  Meds: Opana chronically for pancreatitis, NSAIDs  Injection:   7/2/18 Right C6 7 LILIA #2--70% improved   Right C6 7 LILIA ---85% improvement  Sx: Dr. Ruby Villanueva recommend LILIA prior to surgery    Post injection side Effects: 1. Headache: no              2.Cramping:  no    3. Fever/Chills: no            4.  Other: no    Past Medical History: Medical history form was reviewed & scanned into the chart until Media tab  Past Medical History:   Diagnosis Date    Acromegalia ([address]     Anxiety     Chronic pain syndrome     Chronic pancreatitis (HCC)     Dyslipidemia     Elevated insulin-like growth factor 1 (IGF-1) level     Hypogonadism male     IFG (impaired fasting glucose)     Insomnia     Low serum cortisol level (HCC)     Neuropathic pain     Pancreatitis     Pituitary adenoma (HCC)     Secondary adrenal insufficiency (HCC)         REVIEW OF SYSTEMS:   CONSTITUTIONAL: Denies unexplained weight loss, fevers, chills or fatigue  NEUROLOGIC: Denies tremors or seizures         PHYSICAL EXAM:    Vitals: Blood pressure 135/79, pulse 80, height (!) 5.9\" (0.15 m), weight 227 lb (103 kg). GENERAL EXAM:  · General Apparence: Patient is adequately groomed with no evidence of malnutrition. · Orientation: The patient is oriented to time, place and person. · Mood & Affect:The patient's mood and affect are appropriate   · Vascular: Examination reveals no swelling tenderness in upper or lower extremities. · Lymphatic: The lymphatic examination bilaterally reveals all areas to be without enlargement or induration  · Sensation: Sensation is intact without deficit  · Coordination/Balance: Good coordination   CERVICAL EXAMINATION:  · Inspection: Local inspection shows no step-off or bruising. Cervical alignment is normal.     · Palpation: No evidence of tenderness at the midline, and trapezius. Paraspinal tenderness is present. There is no step-off or paraspinal spasm. · Range of Motion: Intact flexion mild loss of extension and right lateral rotation  · Strength: 5/5 bilateral upper extremities   · Special Tests:    ·   Spurling's Negative today L'Hermitte's & Kearns's negative bilaterally. ·  Cubital and Carpal tunnel Tinel's negative bilaterally. · Skin:There are no rashes, ulcerations or lesions in right & left upper extremities.   · Reflexes: Bilaterally triceps, biceps and brachioradialis are

## 2018-07-26 ENCOUNTER — HOSPITAL ENCOUNTER (OUTPATIENT)
Dept: NON INVASIVE DIAGNOSTICS | Age: 48
Discharge: HOME | End: 2018-07-26
Payer: COMMERCIAL

## 2018-07-26 DIAGNOSIS — I34.0 NONRHEUMATIC MITRAL VALVE INSUFFICIENCY: ICD-10-CM

## 2018-07-26 LAB
LV EF: 55 %
LVEF MODALITY: NORMAL

## 2018-07-26 PROCEDURE — 93306 TTE W/DOPPLER COMPLETE: CPT

## 2018-07-26 RX ORDER — LISINOPRIL 20 MG/1
20 TABLET ORAL DAILY
Qty: 90 TABLET | Refills: 3 | Status: SHIPPED | OUTPATIENT
Start: 2018-07-26 | End: 2019-08-07 | Stop reason: SDUPTHER

## 2018-08-08 ENCOUNTER — OFFICE VISIT (OUTPATIENT)
Dept: CARDIOLOGY CLINIC | Age: 48
End: 2018-08-08

## 2018-08-08 VITALS
BODY MASS INDEX: 33.62 KG/M2 | SYSTOLIC BLOOD PRESSURE: 110 MMHG | HEIGHT: 69 IN | WEIGHT: 227 LBS | HEART RATE: 73 BPM | OXYGEN SATURATION: 99 % | DIASTOLIC BLOOD PRESSURE: 78 MMHG

## 2018-08-08 DIAGNOSIS — I71.02 DISSECTION OF ABDOMINAL AORTA (HCC): ICD-10-CM

## 2018-08-08 DIAGNOSIS — F98.8 ATTENTION DEFICIT DISORDER (ADD) WITHOUT HYPERACTIVITY: ICD-10-CM

## 2018-08-08 DIAGNOSIS — I10 BENIGN HYPERTENSION: Primary | ICD-10-CM

## 2018-08-08 DIAGNOSIS — E78.2 MIXED HYPERLIPIDEMIA: ICD-10-CM

## 2018-08-08 PROCEDURE — G8427 DOCREV CUR MEDS BY ELIG CLIN: HCPCS | Performed by: INTERNAL MEDICINE

## 2018-08-08 PROCEDURE — 93000 ELECTROCARDIOGRAM COMPLETE: CPT | Performed by: INTERNAL MEDICINE

## 2018-08-08 PROCEDURE — G8417 CALC BMI ABV UP PARAM F/U: HCPCS | Performed by: INTERNAL MEDICINE

## 2018-08-08 PROCEDURE — 1036F TOBACCO NON-USER: CPT | Performed by: INTERNAL MEDICINE

## 2018-08-08 PROCEDURE — 99214 OFFICE O/P EST MOD 30 MIN: CPT | Performed by: INTERNAL MEDICINE

## 2018-08-08 RX ORDER — DEXTROAMPHETAMINE SACCHARATE, AMPHETAMINE ASPARTATE, DEXTROAMPHETAMINE SULFATE AND AMPHETAMINE SULFATE 7.5; 7.5; 7.5; 7.5 MG/1; MG/1; MG/1; MG/1
30 TABLET ORAL 2 TIMES DAILY
Qty: 60 TABLET | Refills: 0 | Status: CANCELLED | OUTPATIENT
Start: 2018-08-08 | End: 2018-09-07

## 2018-08-08 NOTE — TELEPHONE ENCOUNTER
Adderall - 7/11/18  *Klonopin  - duplicate request, already pended  Last Office Visit 5/16/18  Pending Appointments 8/15/18

## 2018-08-08 NOTE — PROGRESS NOTES
CHOLECYSTECTOMY      ERCP         Objective:   /78   Pulse 73   Ht 5' 9\" (1.753 m)   Wt 227 lb (103 kg)   SpO2 99%   BMI 33.52 kg/m²     Wt Readings from Last 3 Encounters:   08/08/18 227 lb (103 kg)   07/16/18 227 lb (103 kg)   07/02/18 225 lb (102.1 kg)       Physical Exam:  General: No Respiratory distress, appears well developed and well nourished. Eyes:  Sclera nonicteric  Nose/Sinuses:  negative findings: nose shows no deformity, asymmetry, or inflammation, nasal mucosa normal, septum midline with no perforation or bleeding  Back:  no pain to palpation  Joint:  no active joint inflammation  Musculoskeletal:  negative  Skin:  Warm and dry seaborric dermatitis on neck and face  Neck:  Negative for JVD and Carotid Bruits. Chest:  Clear to auscultation, respiration easy  Cardiovascular:  RRR, S1S2 normal, no murmur, no rub or thrill. Abdomen:  No tenderness, Soft normal liver and spleen  Extremities:   No edema, clubbing, cyanosis,  Pulses: Femoral and pedal pulses are normal.  Neuro: intact    Medications:   Outpatient Encounter Prescriptions as of 8/8/2018   Medication Sig Dispense Refill    ALPRAZolam (XANAX PO) Take by mouth as needed 2mg daily      lisinopril (PRINIVIL;ZESTRIL) 20 MG tablet Take 1 tablet by mouth daily 90 tablet 3    amphetamine-dextroamphetamine (ADDERALL, 30MG,) 30 MG tablet Take 1 tablet by mouth 2 times daily for 30 days. . 60 tablet 0    nadolol (CORGARD) 20 MG tablet TAKE ONE TABLET BY MOUTH DAILY 90 tablet 3    ketoconazole (NIZORAL) 2 % shampoo Wash as needed 120 mL 11    metroNIDAZOLE (METROCREAM) 0.75 % cream Apply to face BID 45 g 6    fluticasone (FLONASE) 50 MCG/ACT nasal spray 2 sprays by Nasal route daily 1 Bottle 12    vitamin D (ERGOCALCIFEROL) 14623 units CAPS capsule TAKE TWO CAPSULES BY MOUTH ONCE WEEKLY (Patient taking differently: TAKE ONE CAPSULE BY MOUTH ONCE WEEKLY) 8 capsule 1    Multiple Vitamin (DAILY-SHANNON) TABS TAKE ONE TABLET BY MOUTH DAILY 30 tablet 1    omega-3 acid ethyl esters (LOVAZA) 1 g capsule Take 2 capsules by mouth 2 times daily 360 capsule 1    Testosterone (AXIRON) 30 MG/ACT SOLN Place 90 mg onto the skin daily 2 Bottle 3    5-Hydroxytryptophan (5-HTP) 50 MG CAPS Take 1 capsule by mouth every morning (before breakfast) 90 capsule 2    Melatonin 2.5 MG CAPS Take 5 mg by mouth       Naloxegol Oxalate (MOVANTIK PO) Take by mouth      oxymorphone (OPANA ER) 40 MG ER tablet Take 40 mg by mouth 4 times daily       polyethylene glycol (GLYCOLAX) packet Take 17 g by mouth daily as needed.  Amylase-Lipase-Protease (CREON 20 PO) Take 20,000 mg by mouth three times daily       omeprazole (PRILOSEC) 20 MG capsule Take 20 mg by mouth Daily       oxycodone (OXY-IR) 30 MG immediate release tablet Take 30 mg by mouth 4 times daily       clonazePAM (KLONOPIN) 1 MG tablet Take 2 tablets by mouth nightly as needed (sleep) for up to 30 days. . 60 tablet 2    triamcinolone (KENALOG) 0.025 % cream APPLY TO AFFECTED AREA(S) DAILY AS NEEDED 15 g 2    pimecrolimus (ELIDEL) 1 % cream Apply topically 2 times daily. 1 Bottle 4    Syringe/Needle, Disp, (SYRINGE 3CC/25GX1\") 25G X 1\" 3 ML MISC 1 each by Does not apply route every 7 days. To inject Testosterone 4 each 11     No facility-administered encounter medications on file as of 8/8/2018. Lab Data:  CBC: No results for input(s): WBC, HGB, HCT, MCV, PLT in the last 72 hours. BMP: No results for input(s): NA, K, CL, CO2, PHOS, BUN, CREATININE in the last 72 hours. Invalid input(s): CA  LIVER PROFILE: No results for input(s): AST, ALT, LIPASE, BILIDIR, BILITOT, ALKPHOS in the last 72 hours. Invalid input(s):   AMYLASE,  ALB  LIPID:   Lab Results   Component Value Date    CHOL 169 05/23/2016    CHOL 165 01/14/2016    CHOL 176 02/25/2015     Lab Results   Component Value Date    TRIG 146 05/23/2016    TRIG 160 (H) 01/14/2016    TRIG 157 (H) 02/25/2015     Lab Results   Component ventricle size and systolic function with an estimated   Ejection fraction of 55%. No regional wall motion abnormalities are seen. Mild  concentric LVH. Grade I diastolic function ( IVRT 724) with impaired relaxation. Mitral valve is structurally normal.  Mitral valve leaflets appear to open adequately. Mild mitral regurgitation is present. The aortic valve is normal in structure and function. There is no  significant aortic regurgitation. The aortic root is at the upper limit of normal in size. The right atrium is slightly enlarged in size. Abdominal Aorta ultrasound 4/9/15  Findings: The aorta is normal in caliber. No significant atherosclerotic plaque or turbulent flow is identified. Measurements are as follows:   Proximal 2.2 x 1.5 cm Mid 1.5 x 1.4 cm Distal 1.4 x 1.2 cm Right common iliac artery 1.3 x 0.7 cm Left common iliac artery 1.3 x 1.0 cm    CT abdomen 11/22/2013  Aorta is   normal in caliber . There is a focal dissection of distal   abdominal aorta just above the bifurcation. Apparently this is   known to the patient and his physician. There is no evidence of   aneurysmal dilatation. Assessment:  Daniel Bryant was seen today for 1 year follow up, hypertension, hyperlipidemia, results, palpitations and edema. Diagnoses and all orders for this visit:    Mixed hyperlipidemia    Aortic root dilatation (HCC) stable over 2 years    Localized Dissection of abdominal aorta (HCC)      Last lipids 5/23/16  Cholesterol, Total 169 Triglycerides 146    HDL 30 (L)    LDL Calculated 110       Plan:  José Luis face may be due to polycythemia due to testosterone supplementation  Orders Placed This Encounter   Procedures    Comprehensive Metabolic Panel    Lipid Panel    EKG 12 Lead   No med changes warranted today  1. Scheduled return visit. In 1 year  2. Check EKG today NSR normal  3. Past hx of localized dissection of abdominal aorta will check US every 5 yrs.   Advised if he has unbearable abdominal pain seek

## 2018-08-08 NOTE — LETTER
 nadolol (CORGARD) 20 MG tablet TAKE ONE TABLET BY MOUTH DAILY 90 tablet 3    ketoconazole (NIZORAL) 2 % shampoo Wash as needed 120 mL 11    metroNIDAZOLE (METROCREAM) 0.75 % cream Apply to face BID 45 g 6    fluticasone (FLONASE) 50 MCG/ACT nasal spray 2 sprays by Nasal route daily 1 Bottle 12    vitamin D (ERGOCALCIFEROL) 35368 units CAPS capsule TAKE TWO CAPSULES BY MOUTH ONCE WEEKLY (Patient taking differently: TAKE ONE CAPSULE BY MOUTH ONCE WEEKLY) 8 capsule 1    Multiple Vitamin (DAILY-SHANNON) TABS TAKE ONE TABLET BY MOUTH DAILY 30 tablet 1    omega-3 acid ethyl esters (LOVAZA) 1 g capsule Take 2 capsules by mouth 2 times daily 360 capsule 1    Testosterone (AXIRON) 30 MG/ACT SOLN Place 90 mg onto the skin daily 2 Bottle 3    5-Hydroxytryptophan (5-HTP) 50 MG CAPS Take 1 capsule by mouth every morning (before breakfast) 90 capsule 2    Melatonin 2.5 MG CAPS Take 5 mg by mouth       Naloxegol Oxalate (MOVANTIK PO) Take by mouth      oxymorphone (OPANA ER) 40 MG ER tablet Take 40 mg by mouth 4 times daily       polyethylene glycol (GLYCOLAX) packet Take 17 g by mouth daily as needed.  Amylase-Lipase-Protease (CREON 20 PO) Take 20,000 mg by mouth three times daily       omeprazole (PRILOSEC) 20 MG capsule Take 20 mg by mouth Daily       oxycodone (OXY-IR) 30 MG immediate release tablet Take 30 mg by mouth 4 times daily       clonazePAM (KLONOPIN) 1 MG tablet Take 2 tablets by mouth nightly as needed (sleep) for up to 30 days. . 60 tablet 2    triamcinolone (KENALOG) 0.025 % cream APPLY TO AFFECTED AREA(S) DAILY AS NEEDED 15 g 2    pimecrolimus (ELIDEL) 1 % cream Apply topically 2 times daily. 1 Bottle 4    Syringe/Needle, Disp, (SYRINGE 3CC/25GX1\") 25G X 1\" 3 ML MISC 1 each by Does not apply route every 7 days. To inject Testosterone 4 each 11     No facility-administered encounter medications on file as of 8/8/2018.          Lab Data: x 1.3 cm. Mid aorta measures 1.6 x 1.3 cm. Distal aorta measure 1.2 x 1.4 cm. Vascular and Doppler interrogation is unremarkable. Iliacs:   Iliac arteries are patent and normal in caliber. ECHO 6/23/17  Normal left ventricular systolic function with an estimated ejection   fraction of 55%.   Normal left ventricular diastolic filling pressure.   The right ventricle is mildly enlarged.   The right atrium is mildly dilated.   Mild mitral regurgitation.   Mild aortic regurgitation.   Systolic pulmonary artery pressure (SPAP) is normal and estimated at 28 mmHg   (RA pressure 3 mmHg). ECHO 4/9/15  Summary  Normal left ventricle size and systolic function with an estimated   Ejection fraction of 55%. No regional wall motion abnormalities are seen. Mild  concentric LVH. Grade I diastolic function ( IVRT 247) with impaired relaxation. Mitral valve is structurally normal.  Mitral valve leaflets appear to open adequately. Mild mitral regurgitation is present. The aortic valve is normal in structure and function. There is no  significant aortic regurgitation. The aortic root is at the upper limit of normal in size. The right atrium is slightly enlarged in size. Abdominal Aorta ultrasound 4/9/15  Findings: The aorta is normal in caliber. No significant atherosclerotic plaque or turbulent flow is identified. Measurements are as follows:   Proximal 2.2 x 1.5 cm Mid 1.5 x 1.4 cm Distal 1.4 x 1.2 cm Right common iliac artery 1.3 x 0.7 cm Left common iliac artery 1.3 x 1.0 cm    CT abdomen 11/22/2013  Aorta is   normal in caliber . There is a focal dissection of distal   abdominal aorta just above the bifurcation. Apparently this is   known to the patient and his physician. There is no evidence of   aneurysmal dilatation. Assessment:  Linnea Zazueta was seen today for 1 year follow up, hypertension, hyperlipidemia, results, palpitations and edema.     Diagnoses and all orders for this visit:    Mixed hyperlipidemia Aortic root dilatation (HCC) stable over 2 years    Localized Dissection of abdominal aorta (HCC)      Last lipids 5/23/16  Cholesterol, Total 169 Triglycerides 146    HDL 30 (L)    LDL Calculated 110       Plan:  José Luis face may be due to polycythemia due to testosterone supplementation  Orders Placed This Encounter   Procedures    Comprehensive Metabolic Panel    Lipid Panel    EKG 12 Lead   No med changes warranted today  1. Scheduled return visit. In 1 year  2. Check EKG today NSR normal  3. Past hx of localized dissection of abdominal aorta will check US every 5 yrs. Advised if he has unbearable abdominal pain seek emergent help. QUALITY MEASURES  1. Tobacco Cessation Counseling: NA  2. Retake of BP if >140/90:   NA  3. Documentation to PCP/referring for new patient:  Sent to PCP at close of office visit  4. CAD patient on anti-platelet: NA  5. CAD patient on STATIN therapy:  NA  6. Patient with CHF and aFib on anticoagulation:  NA     [address] [place], MD 8/8/2018 3:34 PM            If you have questions, please do not hesitate to call me. I look forward to following Chacorta Deras along with you.     Sincerely,        [address] [place], MD

## 2018-08-08 NOTE — PATIENT INSTRUCTIONS
 Comprehensive Metabolic Panel    Lipid Panel    EKG 12 Lead   No med changes warranted today  1. Scheduled return visit. In 1 year  2. Check EKG today  3. Past hx of localized dissection of abdominal aorta will check US every 5 yrs. Advised if he has unbearable abdominal pain seek emergent help.

## 2018-08-08 NOTE — COMMUNICATION BODY
emergent help. QUALITY MEASURES  1. Tobacco Cessation Counseling: NA  2. Retake of BP if >140/90:   NA  3. Documentation to PCP/referring for new patient:  Sent to PCP at close of office visit  4. CAD patient on anti-platelet: NA  5. CAD patient on STATIN therapy:  NA  6.  Patient with CHF and aFib on anticoagulation:  NA     [address] [place], MD 8/8/2018 3:34 PM

## 2018-08-09 DIAGNOSIS — F98.8 ATTENTION DEFICIT DISORDER (ADD) WITHOUT HYPERACTIVITY: ICD-10-CM

## 2018-08-09 RX ORDER — DEXTROAMPHETAMINE SACCHARATE, AMPHETAMINE ASPARTATE, DEXTROAMPHETAMINE SULFATE AND AMPHETAMINE SULFATE 7.5; 7.5; 7.5; 7.5 MG/1; MG/1; MG/1; MG/1
30 TABLET ORAL 2 TIMES DAILY
Qty: 60 TABLET | Refills: 0 | Status: SHIPPED | OUTPATIENT
Start: 2018-08-09 | End: 2018-09-05 | Stop reason: SDUPTHER

## 2018-08-09 RX ORDER — ALPRAZOLAM 2 MG/1
TABLET ORAL
Qty: 45 TABLET | Refills: 1 | OUTPATIENT
Start: 2018-08-09 | End: 2018-09-08

## 2018-08-09 RX ORDER — DEXTROAMPHETAMINE SACCHARATE, AMPHETAMINE ASPARTATE, DEXTROAMPHETAMINE SULFATE AND AMPHETAMINE SULFATE 7.5; 7.5; 7.5; 7.5 MG/1; MG/1; MG/1; MG/1
30 TABLET ORAL 2 TIMES DAILY
Qty: 60 TABLET | Refills: 0 | Status: CANCELLED | OUTPATIENT
Start: 2018-08-09 | End: 2018-09-08

## 2018-08-09 NOTE — TELEPHONE ENCOUNTER
Pt is requesting adderall be filled today  Last fill 7/11  Last seen 5/16  Next appointment 8/15  Medication pending  Xanax not due but I could not remove

## 2018-08-14 RX ORDER — CLONAZEPAM 1 MG/1
2 TABLET ORAL NIGHTLY PRN
Qty: 60 TABLET | Refills: 2 | OUTPATIENT
Start: 2018-08-14 | End: 2018-09-13

## 2018-08-15 ENCOUNTER — OFFICE VISIT (OUTPATIENT)
Dept: FAMILY MEDICINE CLINIC | Age: 48
End: 2018-08-15

## 2018-08-15 VITALS
WEIGHT: 227.2 LBS | SYSTOLIC BLOOD PRESSURE: 138 MMHG | BODY MASS INDEX: 33.65 KG/M2 | HEIGHT: 69 IN | HEART RATE: 66 BPM | OXYGEN SATURATION: 93 % | DIASTOLIC BLOOD PRESSURE: 88 MMHG

## 2018-08-15 DIAGNOSIS — R10.9 LEFT FLANK PAIN: ICD-10-CM

## 2018-08-15 DIAGNOSIS — F41.9 ANXIETY: Primary | ICD-10-CM

## 2018-08-15 DIAGNOSIS — F51.01 PRIMARY INSOMNIA: ICD-10-CM

## 2018-08-15 LAB
BILIRUBIN, POC: NORMAL
BLOOD URINE, POC: NEGATIVE
CLARITY, POC: NORMAL
COLOR, POC: NORMAL
GLUCOSE URINE, POC: NEGATIVE
KETONES, POC: NEGATIVE
LEUKOCYTE EST, POC: NEGATIVE
NITRITE, POC: NEGATIVE
PH, POC: 5.5
PROTEIN, POC: NEGATIVE
SPECIFIC GRAVITY, POC: >=1.03
UROBILINOGEN, POC: NORMAL

## 2018-08-15 PROCEDURE — 99213 OFFICE O/P EST LOW 20 MIN: CPT | Performed by: FAMILY MEDICINE

## 2018-08-15 PROCEDURE — 1036F TOBACCO NON-USER: CPT | Performed by: FAMILY MEDICINE

## 2018-08-15 PROCEDURE — G8427 DOCREV CUR MEDS BY ELIG CLIN: HCPCS | Performed by: FAMILY MEDICINE

## 2018-08-15 PROCEDURE — 81002 URINALYSIS NONAUTO W/O SCOPE: CPT | Performed by: FAMILY MEDICINE

## 2018-08-15 PROCEDURE — G8417 CALC BMI ABV UP PARAM F/U: HCPCS | Performed by: FAMILY MEDICINE

## 2018-08-15 RX ORDER — CLONAZEPAM 1 MG/1
2 TABLET ORAL NIGHTLY PRN
Qty: 60 TABLET | Refills: 2 | Status: SHIPPED | OUTPATIENT
Start: 2018-08-15 | End: 2018-11-02 | Stop reason: SDUPTHER

## 2018-08-15 RX ORDER — ALPRAZOLAM 2 MG/1
2 TABLET ORAL 2 TIMES DAILY PRN
Qty: 45 TABLET | Refills: 2 | Status: SHIPPED | OUTPATIENT
Start: 2018-08-15 | End: 2018-11-02 | Stop reason: SDUPTHER

## 2018-08-15 ASSESSMENT — PATIENT HEALTH QUESTIONNAIRE - PHQ9
SUM OF ALL RESPONSES TO PHQ9 QUESTIONS 1 & 2: 0
1. LITTLE INTEREST OR PLEASURE IN DOING THINGS: 0
SUM OF ALL RESPONSES TO PHQ QUESTIONS 1-9: 0
2. FEELING DOWN, DEPRESSED OR HOPELESS: 0
SUM OF ALL RESPONSES TO PHQ QUESTIONS 1-9: 0

## 2018-08-15 NOTE — PROGRESS NOTES
MD)  Documentation: Possible medication side effects, risk of tolerance/dependence & alternative treatments discussed., No signs of potential drug abuse or diversion identified. Johann Marcano MD)   Primary insomnia  -     clonazePAM (KLONOPIN) 1 MG tablet; Take 2 tablets by mouth nightly as needed (sleep) for up to 30 days. .   Stable on klonopin hs  Left flank pain  -     POCT Urinalysis no Micro  urinalysis negative.   Likely low back strain           Johann Marcano MD

## 2018-08-19 ASSESSMENT — ENCOUNTER SYMPTOMS: RESPIRATORY NEGATIVE: 1

## 2018-08-21 LAB
ALBUMIN SERPL-MCNC: 3 G/DL
ALP BLD-CCNC: 66 U/L
ALT SERPL-CCNC: 37 U/L
ANION GAP SERPL CALCULATED.3IONS-SCNC: NORMAL MMOL/L
AST SERPL-CCNC: 34 U/L
BILIRUB SERPL-MCNC: 0.8 MG/DL (ref 0.1–1.4)
BUN BLDV-MCNC: 12 MG/DL
CALCIUM SERPL-MCNC: 9.6 MG/DL
CHLORIDE BLD-SCNC: 99 MMOL/L
CHOLESTEROL, TOTAL: 177 MG/DL
CHOLESTEROL/HDL RATIO: 7.4
CO2: 26 MMOL/L
CREAT SERPL-MCNC: 0.94 MG/DL
GFR CALCULATED: 95
GLUCOSE BLD-MCNC: 100 MG/DL
HDLC SERPL-MCNC: 24 MG/DL (ref 35–70)
LDL CHOLESTEROL CALCULATED: 119 MG/DL (ref 0–160)
POTASSIUM SERPL-SCNC: 5.1 MMOL/L
PROLACTIN: 4.1
PROSTATE SPECIFIC ANTIGEN: 1 NG/ML
SODIUM BLD-SCNC: 142 MMOL/L
T3 FREE: 3.2
T4 FREE: 1.26
TESTOSTERONE FREE: 6
TESTOSTERONE TOTAL: 578
TOTAL PROTEIN: 7.4
TRIGL SERPL-MCNC: 171 MG/DL
TSH SERPL DL<=0.05 MIU/L-ACNC: 1.86 UIU/ML
VITAMIN D 25-HYDROXY: 35.8
VITAMIN D2, 25 HYDROXY: NORMAL
VITAMIN D3,25 HYDROXY: NORMAL
VLDLC SERPL CALC-MCNC: 34 MG/DL

## 2018-09-05 DIAGNOSIS — F98.8 ATTENTION DEFICIT DISORDER (ADD) WITHOUT HYPERACTIVITY: ICD-10-CM

## 2018-09-06 RX ORDER — DEXTROAMPHETAMINE SACCHARATE, AMPHETAMINE ASPARTATE, DEXTROAMPHETAMINE SULFATE AND AMPHETAMINE SULFATE 7.5; 7.5; 7.5; 7.5 MG/1; MG/1; MG/1; MG/1
30 TABLET ORAL 2 TIMES DAILY
Qty: 60 TABLET | Refills: 0 | Status: SHIPPED | OUTPATIENT
Start: 2018-09-06 | End: 2018-10-03 | Stop reason: SDUPTHER

## 2018-10-03 DIAGNOSIS — F98.8 ATTENTION DEFICIT DISORDER (ADD) WITHOUT HYPERACTIVITY: ICD-10-CM

## 2018-10-03 RX ORDER — DEXTROAMPHETAMINE SACCHARATE, AMPHETAMINE ASPARTATE, DEXTROAMPHETAMINE SULFATE AND AMPHETAMINE SULFATE 7.5; 7.5; 7.5; 7.5 MG/1; MG/1; MG/1; MG/1
30 TABLET ORAL 2 TIMES DAILY
Qty: 60 TABLET | Refills: 0 | Status: SHIPPED | OUTPATIENT
Start: 2018-10-03 | End: 2018-11-02 | Stop reason: SDUPTHER

## 2018-11-02 ENCOUNTER — TELEPHONE (OUTPATIENT)
Dept: FAMILY MEDICINE CLINIC | Age: 48
End: 2018-11-02

## 2018-11-02 ENCOUNTER — OFFICE VISIT (OUTPATIENT)
Dept: FAMILY MEDICINE CLINIC | Age: 48
End: 2018-11-02
Payer: COMMERCIAL

## 2018-11-02 VITALS
HEIGHT: 69 IN | BODY MASS INDEX: 34.07 KG/M2 | HEART RATE: 60 BPM | TEMPERATURE: 97.5 F | SYSTOLIC BLOOD PRESSURE: 130 MMHG | OXYGEN SATURATION: 96 % | DIASTOLIC BLOOD PRESSURE: 70 MMHG | WEIGHT: 230 LBS

## 2018-11-02 DIAGNOSIS — F51.01 PRIMARY INSOMNIA: ICD-10-CM

## 2018-11-02 DIAGNOSIS — E23.7 PITUITARY ABNORMALITY (HCC): ICD-10-CM

## 2018-11-02 DIAGNOSIS — F98.8 ATTENTION DEFICIT DISORDER (ADD) WITHOUT HYPERACTIVITY: ICD-10-CM

## 2018-11-02 DIAGNOSIS — Z01.83 BLOOD TYPING ENCOUNTER: Primary | ICD-10-CM

## 2018-11-02 DIAGNOSIS — F41.9 ANXIETY: Primary | ICD-10-CM

## 2018-11-02 DIAGNOSIS — E27.49 SECONDARY ADRENAL INSUFFICIENCY (HCC): ICD-10-CM

## 2018-11-02 DIAGNOSIS — Z23 NEED FOR INFLUENZA VACCINATION: ICD-10-CM

## 2018-11-02 DIAGNOSIS — K86.1 CHRONIC PANCREATITIS, UNSPECIFIED PANCREATITIS TYPE (HCC): ICD-10-CM

## 2018-11-02 PROCEDURE — 99214 OFFICE O/P EST MOD 30 MIN: CPT | Performed by: FAMILY MEDICINE

## 2018-11-02 PROCEDURE — 90688 IIV4 VACCINE SPLT 0.5 ML IM: CPT | Performed by: FAMILY MEDICINE

## 2018-11-02 PROCEDURE — G8417 CALC BMI ABV UP PARAM F/U: HCPCS | Performed by: FAMILY MEDICINE

## 2018-11-02 PROCEDURE — 1036F TOBACCO NON-USER: CPT | Performed by: FAMILY MEDICINE

## 2018-11-02 PROCEDURE — 90471 IMMUNIZATION ADMIN: CPT | Performed by: FAMILY MEDICINE

## 2018-11-02 PROCEDURE — G8482 FLU IMMUNIZE ORDER/ADMIN: HCPCS | Performed by: FAMILY MEDICINE

## 2018-11-02 PROCEDURE — 3017F COLORECTAL CA SCREEN DOC REV: CPT | Performed by: FAMILY MEDICINE

## 2018-11-02 PROCEDURE — G8427 DOCREV CUR MEDS BY ELIG CLIN: HCPCS | Performed by: FAMILY MEDICINE

## 2018-11-02 RX ORDER — LACTOBACIL 2/BIFIDO 1/S.THERMO 450B CELL
PACKET (EA) ORAL
Qty: 30 CAPSULE | Refills: 0 | COMMUNITY
Start: 2018-11-02 | End: 2021-10-08

## 2018-11-02 RX ORDER — DEXTROAMPHETAMINE SACCHARATE, AMPHETAMINE ASPARTATE, DEXTROAMPHETAMINE SULFATE AND AMPHETAMINE SULFATE 7.5; 7.5; 7.5; 7.5 MG/1; MG/1; MG/1; MG/1
30 TABLET ORAL 2 TIMES DAILY
Qty: 60 TABLET | Refills: 0 | Status: SHIPPED | OUTPATIENT
Start: 2018-11-02 | End: 2018-11-29 | Stop reason: SDUPTHER

## 2018-11-02 RX ORDER — CLONAZEPAM 1 MG/1
2 TABLET ORAL NIGHTLY PRN
Qty: 60 TABLET | Refills: 2 | Status: SHIPPED | OUTPATIENT
Start: 2018-11-02 | End: 2019-01-29 | Stop reason: SDUPTHER

## 2018-11-02 RX ORDER — ALPRAZOLAM 2 MG/1
2 TABLET ORAL 2 TIMES DAILY PRN
Qty: 45 TABLET | Refills: 2 | Status: SHIPPED | OUTPATIENT
Start: 2018-11-02 | End: 2019-01-25 | Stop reason: SDUPTHER

## 2018-11-02 ASSESSMENT — ENCOUNTER SYMPTOMS: RESPIRATORY NEGATIVE: 1

## 2018-11-29 DIAGNOSIS — F98.8 ATTENTION DEFICIT DISORDER (ADD) WITHOUT HYPERACTIVITY: ICD-10-CM

## 2018-11-29 RX ORDER — DEXTROAMPHETAMINE SACCHARATE, AMPHETAMINE ASPARTATE, DEXTROAMPHETAMINE SULFATE AND AMPHETAMINE SULFATE 7.5; 7.5; 7.5; 7.5 MG/1; MG/1; MG/1; MG/1
30 TABLET ORAL 2 TIMES DAILY
Qty: 60 TABLET | Refills: 0 | Status: SHIPPED | OUTPATIENT
Start: 2018-11-29 | End: 2018-12-27 | Stop reason: SDUPTHER

## 2018-12-27 DIAGNOSIS — F98.8 ATTENTION DEFICIT DISORDER (ADD) WITHOUT HYPERACTIVITY: ICD-10-CM

## 2018-12-28 RX ORDER — DEXTROAMPHETAMINE SACCHARATE, AMPHETAMINE ASPARTATE, DEXTROAMPHETAMINE SULFATE AND AMPHETAMINE SULFATE 7.5; 7.5; 7.5; 7.5 MG/1; MG/1; MG/1; MG/1
30 TABLET ORAL 2 TIMES DAILY
Qty: 60 TABLET | Refills: 0 | Status: SHIPPED | OUTPATIENT
Start: 2018-12-28 | End: 2019-01-25 | Stop reason: SDUPTHER

## 2019-01-25 ENCOUNTER — PATIENT MESSAGE (OUTPATIENT)
Dept: FAMILY MEDICINE CLINIC | Age: 49
End: 2019-01-25

## 2019-01-25 DIAGNOSIS — F41.9 ANXIETY: ICD-10-CM

## 2019-01-25 DIAGNOSIS — F98.8 ATTENTION DEFICIT DISORDER (ADD) WITHOUT HYPERACTIVITY: ICD-10-CM

## 2019-01-25 RX ORDER — DEXTROAMPHETAMINE SACCHARATE, AMPHETAMINE ASPARTATE, DEXTROAMPHETAMINE SULFATE AND AMPHETAMINE SULFATE 7.5; 7.5; 7.5; 7.5 MG/1; MG/1; MG/1; MG/1
30 TABLET ORAL 2 TIMES DAILY
Qty: 60 TABLET | Refills: 0 | Status: SHIPPED | OUTPATIENT
Start: 2019-01-25 | End: 2019-02-21 | Stop reason: SDUPTHER

## 2019-01-25 RX ORDER — ALPRAZOLAM 2 MG/1
2 TABLET ORAL 2 TIMES DAILY PRN
Qty: 45 TABLET | Refills: 2 | Status: SHIPPED | OUTPATIENT
Start: 2019-01-25 | End: 2019-05-07 | Stop reason: SDUPTHER

## 2019-01-29 ENCOUNTER — OFFICE VISIT (OUTPATIENT)
Dept: FAMILY MEDICINE CLINIC | Age: 49
End: 2019-01-29
Payer: COMMERCIAL

## 2019-01-29 VITALS
WEIGHT: 231 LBS | SYSTOLIC BLOOD PRESSURE: 128 MMHG | HEIGHT: 69 IN | BODY MASS INDEX: 34.21 KG/M2 | OXYGEN SATURATION: 96 % | HEART RATE: 74 BPM | DIASTOLIC BLOOD PRESSURE: 82 MMHG

## 2019-01-29 DIAGNOSIS — E23.7 PITUITARY ABNORMALITY (HCC): ICD-10-CM

## 2019-01-29 DIAGNOSIS — K86.1 CHRONIC PANCREATITIS, UNSPECIFIED PANCREATITIS TYPE (HCC): ICD-10-CM

## 2019-01-29 DIAGNOSIS — F41.9 ANXIETY: Primary | ICD-10-CM

## 2019-01-29 DIAGNOSIS — F51.01 PRIMARY INSOMNIA: ICD-10-CM

## 2019-01-29 DIAGNOSIS — E27.49 SECONDARY ADRENAL INSUFFICIENCY (HCC): ICD-10-CM

## 2019-01-29 DIAGNOSIS — I10 BENIGN HYPERTENSION: ICD-10-CM

## 2019-01-29 DIAGNOSIS — F90.0 ATTENTION DEFICIT HYPERACTIVITY DISORDER (ADHD), PREDOMINANTLY INATTENTIVE TYPE: ICD-10-CM

## 2019-01-29 PROCEDURE — G8482 FLU IMMUNIZE ORDER/ADMIN: HCPCS | Performed by: FAMILY MEDICINE

## 2019-01-29 PROCEDURE — G8427 DOCREV CUR MEDS BY ELIG CLIN: HCPCS | Performed by: FAMILY MEDICINE

## 2019-01-29 PROCEDURE — 1036F TOBACCO NON-USER: CPT | Performed by: FAMILY MEDICINE

## 2019-01-29 PROCEDURE — 99214 OFFICE O/P EST MOD 30 MIN: CPT | Performed by: FAMILY MEDICINE

## 2019-01-29 PROCEDURE — 3017F COLORECTAL CA SCREEN DOC REV: CPT | Performed by: FAMILY MEDICINE

## 2019-01-29 PROCEDURE — G8417 CALC BMI ABV UP PARAM F/U: HCPCS | Performed by: FAMILY MEDICINE

## 2019-01-29 RX ORDER — DOXEPIN HYDROCHLORIDE 10 MG/1
10-20 CAPSULE ORAL NIGHTLY
COMMUNITY
Start: 2019-01-25 | End: 2019-08-07

## 2019-01-29 RX ORDER — CLONAZEPAM 1 MG/1
2 TABLET ORAL NIGHTLY PRN
Qty: 60 TABLET | Refills: 2 | Status: SHIPPED | OUTPATIENT
Start: 2019-01-29 | End: 2019-05-06

## 2019-02-21 DIAGNOSIS — F98.8 ATTENTION DEFICIT DISORDER (ADD) WITHOUT HYPERACTIVITY: ICD-10-CM

## 2019-02-22 RX ORDER — DEXTROAMPHETAMINE SACCHARATE, AMPHETAMINE ASPARTATE, DEXTROAMPHETAMINE SULFATE AND AMPHETAMINE SULFATE 7.5; 7.5; 7.5; 7.5 MG/1; MG/1; MG/1; MG/1
30 TABLET ORAL 2 TIMES DAILY
Qty: 60 TABLET | Refills: 0 | Status: SHIPPED | OUTPATIENT
Start: 2019-02-22 | End: 2019-03-22 | Stop reason: SDUPTHER

## 2019-03-22 DIAGNOSIS — F98.8 ATTENTION DEFICIT DISORDER (ADD) WITHOUT HYPERACTIVITY: ICD-10-CM

## 2019-03-22 RX ORDER — FLUTICASONE PROPIONATE 50 MCG
SPRAY, SUSPENSION (ML) NASAL
Qty: 1 BOTTLE | Refills: 11 | Status: SHIPPED | OUTPATIENT
Start: 2019-03-22 | End: 2020-04-14

## 2019-03-26 RX ORDER — DEXTROAMPHETAMINE SACCHARATE, AMPHETAMINE ASPARTATE, DEXTROAMPHETAMINE SULFATE AND AMPHETAMINE SULFATE 7.5; 7.5; 7.5; 7.5 MG/1; MG/1; MG/1; MG/1
30 TABLET ORAL 2 TIMES DAILY
Qty: 60 TABLET | Refills: 0 | Status: SHIPPED | OUTPATIENT
Start: 2019-03-26 | End: 2019-04-29

## 2019-03-26 RX ORDER — FLUTICASONE PROPIONATE 50 MCG
2 SPRAY, SUSPENSION (ML) NASAL DAILY
Qty: 1 BOTTLE | Refills: 12 | Status: SHIPPED | OUTPATIENT
Start: 2019-03-26 | End: 2019-08-07 | Stop reason: SDUPTHER

## 2019-04-17 DIAGNOSIS — F98.8 ATTENTION DEFICIT DISORDER (ADD) WITHOUT HYPERACTIVITY: ICD-10-CM

## 2019-04-17 RX ORDER — KETOCONAZOLE 20 MG/ML
SHAMPOO TOPICAL
Qty: 120 ML | Refills: 11 | Status: SHIPPED | OUTPATIENT
Start: 2019-04-17 | End: 2019-05-06 | Stop reason: SDUPTHER

## 2019-04-18 RX ORDER — KETOCONAZOLE 20 MG/ML
SHAMPOO TOPICAL
Qty: 120 ML | Refills: 10 | Status: SHIPPED | OUTPATIENT
Start: 2019-04-18 | End: 2019-05-06

## 2019-04-18 RX ORDER — DEXTROAMPHETAMINE SACCHARATE, AMPHETAMINE ASPARTATE, DEXTROAMPHETAMINE SULFATE AND AMPHETAMINE SULFATE 7.5; 7.5; 7.5; 7.5 MG/1; MG/1; MG/1; MG/1
30 TABLET ORAL 2 TIMES DAILY
Qty: 60 TABLET | Refills: 0 | OUTPATIENT
Start: 2019-04-18 | End: 2019-05-18

## 2019-04-24 ENCOUNTER — TELEPHONE (OUTPATIENT)
Dept: DERMATOLOGY | Age: 49
End: 2019-04-24

## 2019-04-24 NOTE — TELEPHONE ENCOUNTER
Carson shha/b [phone]  Carson Avila states:   - calling from [facility]    - Ketoconazole 2 % shampoo    - needs to know frequency     I spoke with Nola Byrd stated daily

## 2019-04-29 ENCOUNTER — OFFICE VISIT (OUTPATIENT)
Dept: FAMILY MEDICINE CLINIC | Age: 49
End: 2019-04-29
Payer: COMMERCIAL

## 2019-04-29 VITALS
HEART RATE: 61 BPM | DIASTOLIC BLOOD PRESSURE: 88 MMHG | OXYGEN SATURATION: 98 % | HEIGHT: 69 IN | WEIGHT: 232 LBS | BODY MASS INDEX: 34.36 KG/M2 | SYSTOLIC BLOOD PRESSURE: 138 MMHG

## 2019-04-29 DIAGNOSIS — F90.0 ATTENTION DEFICIT HYPERACTIVITY DISORDER (ADHD), PREDOMINANTLY INATTENTIVE TYPE: ICD-10-CM

## 2019-04-29 DIAGNOSIS — F51.01 PRIMARY INSOMNIA: ICD-10-CM

## 2019-04-29 DIAGNOSIS — F41.9 ANXIETY: Primary | ICD-10-CM

## 2019-04-29 PROCEDURE — G8417 CALC BMI ABV UP PARAM F/U: HCPCS | Performed by: FAMILY MEDICINE

## 2019-04-29 PROCEDURE — 3017F COLORECTAL CA SCREEN DOC REV: CPT | Performed by: FAMILY MEDICINE

## 2019-04-29 PROCEDURE — G8427 DOCREV CUR MEDS BY ELIG CLIN: HCPCS | Performed by: FAMILY MEDICINE

## 2019-04-29 PROCEDURE — 99214 OFFICE O/P EST MOD 30 MIN: CPT | Performed by: FAMILY MEDICINE

## 2019-04-29 PROCEDURE — 1036F TOBACCO NON-USER: CPT | Performed by: FAMILY MEDICINE

## 2019-04-29 RX ORDER — ATOMOXETINE 80 MG/1
80 CAPSULE ORAL DAILY
Qty: 30 CAPSULE | Refills: 3 | Status: SHIPPED | OUTPATIENT
Start: 2019-04-29 | End: 2019-10-01

## 2019-04-29 NOTE — PROGRESS NOTES
Subjective:      Patient ID: Andres Ramsay is a 46 y.o. male. HPI   Pt is a of 46 y.o. male comes in today with   Chief Complaint   Patient presents with    Medication Check     Patient is here for medication check and refill.  Leg Pain     Patient complains of left leg pain, bilaterally around knee. Left knee pain. Occurs on both sides of the knee. Soreness on both sides. Worse with twisting. adderall has helped with add. Chronic pain per GI. On klonopin for sleep. Initially started by psych. Takes 1 1/2 xanax daily prn for anxiety. Stable on current meds for years. No sedation. Past Medical History:Reviewed  Medications:Reviewed. Allergies   Allergen Reactions    Ambien [Zolpidem] Other (See Comments)     Blackout lists    Augmentin [Amoxicillin-Pot Clavulanate] Other (See Comments)     diarrhea    Elavil [Amitriptyline] Other (See Comments)     SICK TO STOMACH, JITTERY FEELING    Neurontin [Gabapentin] Other (See Comments)     Did not feel good    Topamax Other (See Comments)     Did not feel good    Tramadol Other (See Comments)     Patient has been on high dose pain medincines since 2002 so tramadol doesn't help with pain at all      Social hx:Reviewed. Social History     Tobacco Use   Smoking Status Former Smoker    Years: 2.00    Types: Cigars   Smokeless Tobacco Never Used     Review of Systems   Constitutional: Negative for fatigue and unexpected weight change. Respiratory: Negative. Cardiovascular: Negative. Objective:   Physical Exam   Constitutional: He is oriented to person, place, and time. He appears well-developed and well-nourished. No distress. HENT:   Head: Normocephalic and atraumatic. Eyes: Pupils are equal, round, and reactive to light. No scleral icterus. Neurological: He is alert and oriented to person, place, and time. No cranial nerve deficit. Skin: Skin is warm and dry. He is not diaphoretic.    Psychiatric: He has a normal mood and affect. His behavior is normal. Judgment and thought content normal.       Assessment:       Diagnosis Orders   1. Anxiety     2. Primary insomnia     3. Attention deficit hyperactivity disorder (ADHD), predominantly inattentive type            Plan:      Guille Evans was seen today for medication check and leg pain. Diagnoses and all orders for this visit:    Anxiety  Stable on xanax. Cautioned re: potential sedation of benzodiazpines with narcotics. Declined rx for naltrexone  Controlled Substances Monitoring: Attestation: The Prescription Monitoring Report for this patient was reviewed today. Kvng Amaya MD)  Chronic Pain Routine Monitoring: Possible medication side effects, risk of tolerance/dependence & alternative treatments discussed., No signs of potential drug abuse or diversion identified: otherwise, see note documentation Kvng Amaya MD)   Primary insomnia  Stable on klonopin  Attention deficit hyperactivity disorder (ADHD), predominantly inattentive type  Will d/c stimulant. Trial of strattera. Medication side affects and adverse reactions reviewed. Other orders  -     atomoxetine (STRATTERA) 80 MG capsule;  Take 1 capsule by mouth daily             Kvng Amaya MD

## 2019-04-30 ASSESSMENT — ENCOUNTER SYMPTOMS: RESPIRATORY NEGATIVE: 1

## 2019-05-06 ENCOUNTER — OFFICE VISIT (OUTPATIENT)
Dept: DERMATOLOGY | Age: 49
End: 2019-05-06
Payer: COMMERCIAL

## 2019-05-06 ENCOUNTER — PATIENT MESSAGE (OUTPATIENT)
Dept: FAMILY MEDICINE CLINIC | Age: 49
End: 2019-05-06

## 2019-05-06 DIAGNOSIS — L21.9 SEBORRHEIC DERMATITIS: ICD-10-CM

## 2019-05-06 DIAGNOSIS — D22.9 BENIGN NEVUS: ICD-10-CM

## 2019-05-06 DIAGNOSIS — L71.9 ROSACEA: Primary | ICD-10-CM

## 2019-05-06 DIAGNOSIS — F51.01 PRIMARY INSOMNIA: ICD-10-CM

## 2019-05-06 DIAGNOSIS — D18.00 HEMANGIOMA: ICD-10-CM

## 2019-05-06 DIAGNOSIS — F41.9 ANXIETY: ICD-10-CM

## 2019-05-06 PROCEDURE — 3017F COLORECTAL CA SCREEN DOC REV: CPT | Performed by: DERMATOLOGY

## 2019-05-06 PROCEDURE — 1036F TOBACCO NON-USER: CPT | Performed by: DERMATOLOGY

## 2019-05-06 PROCEDURE — G8417 CALC BMI ABV UP PARAM F/U: HCPCS | Performed by: DERMATOLOGY

## 2019-05-06 PROCEDURE — G8427 DOCREV CUR MEDS BY ELIG CLIN: HCPCS | Performed by: DERMATOLOGY

## 2019-05-06 PROCEDURE — 99214 OFFICE O/P EST MOD 30 MIN: CPT | Performed by: DERMATOLOGY

## 2019-05-06 RX ORDER — KETOCONAZOLE 20 MG/ML
SHAMPOO TOPICAL
Qty: 120 ML | Refills: 11 | Status: SHIPPED | OUTPATIENT
Start: 2019-05-06 | End: 2020-03-18 | Stop reason: SDUPTHER

## 2019-05-06 NOTE — PROGRESS NOTES
[facility]) Dermatology  Jonh Sewell M.D.  [phone]       Rusty Mansfield  1970    46 y.o. male     Date of Visit: 5/6/2019    Chief Complaint:   Chief Complaint   Patient presents with    6 Month Follow-Up    Skin Lesion        I was asked to see this patient by Dr. Harrison ref. provider found. History of Present Illness:  1. Total body skin exam    No previous personal or family history of skin cancer    Rosacea-excellent improvement on MetroCream 0.75%. Uses MetroCream 0.75% daily-b.i.d. as needed. Still has persistent background erythema but rarely develops inflammatory papules. Notes that if he misses applications of MetroCream, he does develop inflammatory papules. Washes with ketoconazole 2% shampoo face and scalp are regularly-good control of seborrheic dermatitis. Happy with improvement. Increasing number of erythematous papules over his torso. Asymptomatic. Increasing in both size and number    Multiple nevi. Stable in size, shape, color. Asymptomatic. Has not noticed any new or changing pigmented lesions      Review of Systems:  Constitutional: Reports general sense of well-being   Skin: No new rashes or changing pigmented lesions    Past Medical History, Surgical History, Family History, Medications and Allergies reviewed. Social History:   Social History     Socioeconomic History    Marital status:      Spouse name: Not on file    Number of children: Not on file    Years of education: Not on file    Highest education level: Not on file   Occupational History    Not on file   Social Needs    Financial resource strain: Not on file    Food insecurity:     Worry: Not on file     Inability: Not on file    Transportation needs:     Medical: Not on file     Non-medical: Not on file   Tobacco Use    Smoking status: Former Smoker     Years: 2.00     Types: Cigars    Smokeless tobacco: Never Used   Substance and Sexual Activity    Alcohol use: No    Drug use:  No  Sexual activity: Yes     Partners: Female     Comment: ; 3 children   Lifestyle    Physical activity:     Days per week: Not on file     Minutes per session: Not on file    Stress: Not on file   Relationships    Social connections:     Talks on phone: Not on file     Gets together: Not on file     Attends Evangelical service: Not on file     Active member of club or organization: Not on file     Attends meetings of clubs or organizations: Not on file     Relationship status: Not on file    Intimate partner violence:     Fear of current or ex partner: Not on file     Emotionally abused: Not on file     Physically abused: Not on file     Forced sexual activity: Not on file   Other Topics Concern    Not on file   Social History Narrative    Not on file       Physical Examination       -General: Well-appearing, NAD  1. Normal affect. Total body skin exam including scalp, face, neck, chest, abdomen, back, bilateral upper extremities, bilateral lower extremities, ocular conjunctiva, external lips, and nails was performed. Examination normal unless stated below. Underwear area not examined. Scattered on the trunk and extremities are multiple well-defined round and oval symmetric smoothly-bordered uniformly brown macules and papules. Scattered lobulated erythematous papules over his torso. Minimal seborrheic dermatitis face or scalp. Diffuse background erythema forehead, nose, cheeks, chin. No active inflammatory papules      Assessment and Plan     1. Rosacea -continue metro cream 0.75% daily-b.i.d. as needed. Refills given for a year    2. Seborrheic dermatitis -continue ketoconazole 2% shampoo as needed face and scalp    3.  Benign nevus - Benign acquired melanocytic nevi  -Recommend monthly self skin exams   -Educated regarding the ABCDEs of melanoma detection   -Call for any new/changing moles or concerning lesions  -Reviewed sun protective behavior -- sun avoidance during the peak hours of the

## 2019-05-06 NOTE — TELEPHONE ENCOUNTER
Xanax - 1/25/19  Klonopin - 1/29/19  Controlled Substances Monitoring: Attestation: The Prescription Monitoring Report for this patient was reviewed today.  Nga Vail MD)  Chronic Pain Routine Monitoring: Possible medication side effects, risk of tolerance/dependence & alternative treatments discussed., No signs of potential drug abuse or diversion identified: otherwise, see note documentation Nga Vail MD)  04/29/19

## 2019-05-07 RX ORDER — ALPRAZOLAM 2 MG/1
2 TABLET ORAL 2 TIMES DAILY PRN
Qty: 45 TABLET | Refills: 2 | Status: SHIPPED | OUTPATIENT
Start: 2019-05-07 | End: 2019-08-07 | Stop reason: SDUPTHER

## 2019-05-07 RX ORDER — CLONAZEPAM 1 MG/1
2 TABLET ORAL NIGHTLY PRN
Qty: 60 TABLET | Refills: 2 | Status: SHIPPED | OUTPATIENT
Start: 2019-05-07 | End: 2019-08-07 | Stop reason: SDUPTHER

## 2019-06-07 ENCOUNTER — PATIENT MESSAGE (OUTPATIENT)
Dept: FAMILY MEDICINE CLINIC | Age: 49
End: 2019-06-07

## 2019-06-07 ENCOUNTER — TELEPHONE (OUTPATIENT)
Dept: FAMILY MEDICINE CLINIC | Age: 49
End: 2019-06-07

## 2019-06-07 NOTE — TELEPHONE ENCOUNTER
Please call pt regarding a PA for his Klonopin and Xanax Rx. Pt wants to know if the faxed forms were received from a company name [employer] today.

## 2019-06-10 RX ORDER — NADOLOL 20 MG/1
TABLET ORAL
Qty: 90 TABLET | Refills: 0 | Status: SHIPPED | OUTPATIENT
Start: 2019-06-10 | End: 2019-09-03 | Stop reason: SDUPTHER

## 2019-06-10 RX ORDER — NADOLOL 20 MG/1
TABLET ORAL
Qty: 90 TABLET | Refills: 2 | OUTPATIENT
Start: 2019-06-10

## 2019-06-10 NOTE — TELEPHONE ENCOUNTER
[facility] - Medication PA department [facility]): Joseph handled the call. Reviewed OV notes from 4/29/2019: Anxiety  Stable on xanax. Cautioned re: potential sedation of benzodiazpines with narcotics. Declined rx for naltrexone  Controlled Substances Monitoring: Attestation: The Prescription Monitoring Report for this patient was reviewed today. Nani Arita MD)  Chronic Pain Routine Monitoring: Possible medication side effects, risk of tolerance/dependence & alternative treatments discussed., No signs of potential drug abuse or diversion identified: otherwise, see note documentation Nani Arita MD)     Primary insomnia  Stable on klonopin        Advised that GI is responsible for monitoring pain meds per Subjective in note. Received approval for   Clonazepam # [id number] - effective 06/10/2019 - 9/9/2019  Xanax # [id number]  - effective 6/10/2019 - 06/10/2019   determination letter will be faxed as well.      Call ended after 1 hr:15 min  Patient informed

## 2019-06-10 NOTE — TELEPHONE ENCOUNTER
From: Nikki Parry  To: Yesika Crawley MD  Sent: 6/7/2019 2:15 PM EDT  Subject: Prescription Question    Thank you. I understand you are short staffed today. So I appreciate it even more. The fax should be there based on what they told me. Sincerely     Yadira Jones    ----- Message -----  From: Marla Abdi  Sent: 6/7/19, 2:13 PM  To: Nikki Parry  Subject: RE: Prescription Question    We will be on the lookout for the fax and will take care of it for you. MARISOL Koch, AMT  Registered Medical Assistant for:     [facility]  [address]  [address]    P: [phone]  F: [phone]      ----- Message -----   From: Nikki Parry   Sent: 6/7/2019 1:56 PM EDT   To: Yesika Crawley MD  Subject: Prescription Question    Hi Dr Justina Richard,    I was just notified by Gem Gordon that alporazapam and klonopin now require a PA. [employer], who manages [facility], said [facility] [address] [place] [address] updated rules recently. Patients on pain meds and benzodiazepines require a pa for those meds. The CS agent said she faxed your office the paperwork for both meds. She also said that I should request that you put Urgent or Expedite on the forms. Otherwise their review can take 7 days from the date they receive it. If you would please do that I'd appreciate it. Thank you and sorry for the Friday request. They don't have a process to give us advance notice.      Sincerely     Yadira Jones

## 2019-06-12 ENCOUNTER — TELEPHONE (OUTPATIENT)
Dept: FAMILY MEDICINE CLINIC | Age: 49
End: 2019-06-12

## 2019-06-12 NOTE — TELEPHONE ENCOUNTER
Pt would like Palmira to call him regarding the corrected PA that has not been received by the pharmacy. Pt will answer any questions that you may have.

## 2019-07-02 ENCOUNTER — OFFICE VISIT (OUTPATIENT)
Dept: FAMILY MEDICINE CLINIC | Age: 49
End: 2019-07-02
Payer: COMMERCIAL

## 2019-07-02 VITALS
DIASTOLIC BLOOD PRESSURE: 72 MMHG | BODY MASS INDEX: 33.62 KG/M2 | HEIGHT: 69 IN | OXYGEN SATURATION: 93 % | WEIGHT: 227 LBS | HEART RATE: 71 BPM | SYSTOLIC BLOOD PRESSURE: 110 MMHG

## 2019-07-02 DIAGNOSIS — F90.0 ATTENTION DEFICIT HYPERACTIVITY DISORDER (ADHD), PREDOMINANTLY INATTENTIVE TYPE: Primary | ICD-10-CM

## 2019-07-02 DIAGNOSIS — I10 BENIGN HYPERTENSION: ICD-10-CM

## 2019-07-02 DIAGNOSIS — G89.29 CHRONIC PAIN OF LEFT KNEE: ICD-10-CM

## 2019-07-02 DIAGNOSIS — M25.562 CHRONIC PAIN OF LEFT KNEE: ICD-10-CM

## 2019-07-02 DIAGNOSIS — F51.01 PRIMARY INSOMNIA: ICD-10-CM

## 2019-07-02 DIAGNOSIS — F41.9 ANXIETY: ICD-10-CM

## 2019-07-02 PROCEDURE — G8417 CALC BMI ABV UP PARAM F/U: HCPCS | Performed by: FAMILY MEDICINE

## 2019-07-02 PROCEDURE — G8427 DOCREV CUR MEDS BY ELIG CLIN: HCPCS | Performed by: FAMILY MEDICINE

## 2019-07-02 PROCEDURE — 3017F COLORECTAL CA SCREEN DOC REV: CPT | Performed by: FAMILY MEDICINE

## 2019-07-02 PROCEDURE — 1036F TOBACCO NON-USER: CPT | Performed by: FAMILY MEDICINE

## 2019-07-02 PROCEDURE — 99214 OFFICE O/P EST MOD 30 MIN: CPT | Performed by: FAMILY MEDICINE

## 2019-07-02 ASSESSMENT — ENCOUNTER SYMPTOMS: RESPIRATORY NEGATIVE: 1

## 2019-07-17 ENCOUNTER — TELEPHONE (OUTPATIENT)
Dept: CARDIOLOGY CLINIC | Age: 49
End: 2019-07-17

## 2019-08-06 NOTE — PROGRESS NOTES
5-Hydroxytryptophan (5-HTP) 50 MG CAPS Take 1 capsule by mouth every morning (before breakfast) 90 capsule 2    Melatonin 2.5 MG CAPS Take 5 mg by mouth       Naloxegol Oxalate (MOVANTIK PO) Take by mouth      oxymorphone (OPANA ER) 40 MG ER tablet Take 40 mg by mouth 4 times daily       polyethylene glycol (GLYCOLAX) packet Take 17 g by mouth daily as needed.  Amylase-Lipase-Protease (CREON 20 PO) Take 20,000 mg by mouth three times daily       omeprazole (PRILOSEC) 20 MG capsule Take 20 mg by mouth Daily       oxycodone (OXY-IR) 30 MG immediate release tablet Take 30 mg by mouth 4 times daily       clonazePAM (KLONOPIN) 1 MG tablet Take 2 tablets by mouth nightly as needed (sleep) for up to 30 days. 60 tablet 2    [DISCONTINUED] fluticasone (FLONASE) 50 MCG/ACT nasal spray 2 sprays by Nasal route daily 1 Bottle 12    [DISCONTINUED] doxepin (SINEQUAN) 10 MG capsule Take 10-20 mg by mouth nightly before bedtime      [DISCONTINUED] lisinopril (PRINIVIL;ZESTRIL) 20 MG tablet Take 1 tablet by mouth daily 90 tablet 3    [DISCONTINUED] Syringe/Needle, Disp, (SYRINGE 3CC/25GX1\") 25G X 1\" 3 ML MISC 1 each by Does not apply route every 7 days. To inject Testosterone 4 each 11     No facility-administered encounter medications on file as of 8/7/2019. Lab Data:  CBC: No results for input(s): WBC, HGB, HCT, MCV, PLT in the last 72 hours. BMP: No results for input(s): NA, K, CL, CO2, PHOS, BUN, CREATININE in the last 72 hours. Invalid input(s): CA  LIVER PROFILE: No results for input(s): AST, ALT, LIPASE, BILIDIR, BILITOT, ALKPHOS in the last 72 hours. Invalid input(s):   AMYLASE,  ALB  LIPID:   Lab Results   Component Value Date    CHOL 177 08/21/2018    CHOL 169 05/23/2016    CHOL 165 01/14/2016     Lab Results   Component Value Date    TRIG 171 08/21/2018    TRIG 146 05/23/2016    TRIG 160 (H) 01/14/2016     Lab Results   Component Value Date    HDL 24 (A) 08/21/2018    HDL 27 (L) 07/21/2017

## 2019-08-07 ENCOUNTER — OFFICE VISIT (OUTPATIENT)
Dept: CARDIOLOGY CLINIC | Age: 49
End: 2019-08-07
Payer: COMMERCIAL

## 2019-08-07 ENCOUNTER — PATIENT MESSAGE (OUTPATIENT)
Dept: FAMILY MEDICINE CLINIC | Age: 49
End: 2019-08-07

## 2019-08-07 VITALS
BODY MASS INDEX: 33.82 KG/M2 | HEART RATE: 60 BPM | HEIGHT: 69 IN | WEIGHT: 228.3 LBS | DIASTOLIC BLOOD PRESSURE: 78 MMHG | OXYGEN SATURATION: 95 % | SYSTOLIC BLOOD PRESSURE: 122 MMHG

## 2019-08-07 DIAGNOSIS — F51.01 PRIMARY INSOMNIA: ICD-10-CM

## 2019-08-07 DIAGNOSIS — F41.9 ANXIETY: ICD-10-CM

## 2019-08-07 DIAGNOSIS — I10 BENIGN HYPERTENSION: ICD-10-CM

## 2019-08-07 DIAGNOSIS — R00.1 SINUS BRADYCARDIA: Primary | ICD-10-CM

## 2019-08-07 DIAGNOSIS — E78.2 MIXED HYPERLIPIDEMIA: ICD-10-CM

## 2019-08-07 PROCEDURE — G8427 DOCREV CUR MEDS BY ELIG CLIN: HCPCS | Performed by: INTERNAL MEDICINE

## 2019-08-07 PROCEDURE — 1036F TOBACCO NON-USER: CPT | Performed by: INTERNAL MEDICINE

## 2019-08-07 PROCEDURE — 3017F COLORECTAL CA SCREEN DOC REV: CPT | Performed by: INTERNAL MEDICINE

## 2019-08-07 PROCEDURE — G8417 CALC BMI ABV UP PARAM F/U: HCPCS | Performed by: INTERNAL MEDICINE

## 2019-08-07 PROCEDURE — 99214 OFFICE O/P EST MOD 30 MIN: CPT | Performed by: INTERNAL MEDICINE

## 2019-08-07 RX ORDER — DEXAMETHASONE 0.5 MG/5ML
ELIXIR ORAL DAILY
COMMUNITY
End: 2021-12-14 | Stop reason: ALTCHOICE

## 2019-08-07 RX ORDER — LISINOPRIL 20 MG/1
20 TABLET ORAL DAILY
Qty: 90 TABLET | Refills: 3 | Status: SHIPPED | OUTPATIENT
Start: 2019-08-07 | End: 2020-07-22

## 2019-08-07 RX ORDER — ALPRAZOLAM 1 MG/1
2 TABLET ORAL 2 TIMES DAILY
COMMUNITY
End: 2019-08-08

## 2019-08-07 NOTE — LETTER
 Testosterone (AXIRON) 30 MG/ACT SOLN Place 90 mg onto the skin daily 2 Bottle 3    5-Hydroxytryptophan (5-HTP) 50 MG CAPS Take 1 capsule by mouth every morning (before breakfast) 90 capsule 2    Melatonin 2.5 MG CAPS Take 5 mg by mouth       Naloxegol Oxalate (MOVANTIK PO) Take by mouth      oxymorphone (OPANA ER) 40 MG ER tablet Take 40 mg by mouth 4 times daily       polyethylene glycol (GLYCOLAX) packet Take 17 g by mouth daily as needed.  Amylase-Lipase-Protease (CREON 20 PO) Take 20,000 mg by mouth three times daily       omeprazole (PRILOSEC) 20 MG capsule Take 20 mg by mouth Daily       oxycodone (OXY-IR) 30 MG immediate release tablet Take 30 mg by mouth 4 times daily       clonazePAM (KLONOPIN) 1 MG tablet Take 2 tablets by mouth nightly as needed (sleep) for up to 30 days. 60 tablet 2    [DISCONTINUED] fluticasone (FLONASE) 50 MCG/ACT nasal spray 2 sprays by Nasal route daily 1 Bottle 12    [DISCONTINUED] doxepin (SINEQUAN) 10 MG capsule Take 10-20 mg by mouth nightly before bedtime      [DISCONTINUED] lisinopril (PRINIVIL;ZESTRIL) 20 MG tablet Take 1 tablet by mouth daily 90 tablet 3    [DISCONTINUED] Syringe/Needle, Disp, (SYRINGE 3CC/25GX1\") 25G X 1\" 3 ML MISC 1 each by Does not apply route every 7 days. To inject Testosterone 4 each 11     No facility-administered encounter medications on file as of 8/7/2019. Lab Data:  CBC: No results for input(s): WBC, HGB, HCT, MCV, PLT in the last 72 hours. BMP: No results for input(s): NA, K, CL, CO2, PHOS, BUN, CREATININE in the last 72 hours. Invalid input(s): CA  LIVER PROFILE: No results for input(s): AST, ALT, LIPASE, BILIDIR, BILITOT, ALKPHOS in the last 72 hours. Invalid input(s):   AMYLASE,  ALB  LIPID:   Lab Results   Component Value Date    CHOL 177 08/21/2018    CHOL 169 05/23/2016    CHOL 165 01/14/2016     Lab Results   Component Value Date    TRIG 171 08/21/2018    TRIG 146 05/23/2016    TRIG 160 (H) 01/14/2016 Lab Results   Component Value Date    HDL 24 (A) 08/21/2018    HDL 27 (L) 07/21/2017    HDL 30 (L) 05/23/2016     Lab Results   Component Value Date    LDLCALC 119 08/21/2018    LDLCALC 130 (H) 07/21/2017    LDLCALC 110 (H) 05/23/2016    LDLCHOLESTEROL 128 10/24/2014     Lab Results   Component Value Date    LABVLDL 27 07/21/2017    LABVLDL 29 05/23/2016    LABVLDL 32 01/14/2016    VLDL 34 08/21/2018     Lab Results   Component Value Date    CHOLHDLRATIO 7.4 08/21/2018    CHOLHDLRATIO 5.7 (H) 05/25/2010     PT/INR: No results for input(s): PROTIME, INR in the last 72 hours. A1C:   Lab Results   Component Value Date    LABA1C 5.4 05/23/2016     BNP:  No results for input(s): BNP in the last 72 hours. IMAGING:   EKG 8/8/18  NSR normal  ECHO 7/26/18  Summary   Normal LV systolic function with an estimated EF of 55%.   There is mild concentric left ventricular hypertrophy.   No regional wall motion abnormalities are seen.   Normal left ventricular diastolic filling pressure.   Mild mitral, aortic, and tricuspid regurgitation.     Carotid US 5/29/18  1. There is no gross plaque or stenosis in the internal carotid arteries  bilaterally. 2. The vertebral arteries are patent with antegrade flow bilaterally    Abdominal US 7/21/17  FINDINGS: Aorta:   Abdominal aorta is normal in caliber and not significantly changed from the prior study. Proximal aorta measures 1.6 x 1.3 cm. Mid aorta measures 1.6 x 1.3 cm. Distal aorta measure 1.2 x 1.4 cm. Vascular and Doppler interrogation is unremarkable. Iliacs:   Iliac arteries are patent and normal in caliber. ECHO 6/23/17  Normal left ventricular systolic function with an estimated ejection   fraction of 55%.   Normal left ventricular diastolic filling pressure.   The right ventricle is mildly enlarged.   The right atrium is mildly dilated.   Mild mitral regurgitation.   Mild aortic regurgitation.

## 2019-08-08 RX ORDER — CLONAZEPAM 1 MG/1
TABLET ORAL
Qty: 60 TABLET | Refills: 1 | OUTPATIENT
Start: 2019-08-08

## 2019-08-08 RX ORDER — ALPRAZOLAM 2 MG/1
TABLET ORAL
Qty: 10 TABLET | Refills: 0 | Status: SHIPPED | OUTPATIENT
Start: 2019-08-08 | End: 2019-08-15 | Stop reason: SDUPTHER

## 2019-08-08 RX ORDER — CLONAZEPAM 1 MG/1
2 TABLET ORAL NIGHTLY PRN
Qty: 14 TABLET | Refills: 0 | Status: SHIPPED | OUTPATIENT
Start: 2019-08-11 | End: 2019-09-05 | Stop reason: SDUPTHER

## 2019-08-14 DIAGNOSIS — F41.9 ANXIETY: ICD-10-CM

## 2019-08-15 RX ORDER — ALPRAZOLAM 2 MG/1
TABLET ORAL
Qty: 60 TABLET | Refills: 0 | Status: SHIPPED | OUTPATIENT
Start: 2019-08-15 | End: 2019-09-05 | Stop reason: SDUPTHER

## 2019-09-03 RX ORDER — NADOLOL 20 MG/1
TABLET ORAL
Qty: 90 TABLET | Refills: 3 | Status: SHIPPED | OUTPATIENT
Start: 2019-09-03 | End: 2019-10-01 | Stop reason: ALTCHOICE

## 2019-09-05 ENCOUNTER — PATIENT MESSAGE (OUTPATIENT)
Dept: FAMILY MEDICINE CLINIC | Age: 49
End: 2019-09-05

## 2019-09-05 DIAGNOSIS — F41.9 ANXIETY: ICD-10-CM

## 2019-09-05 DIAGNOSIS — F51.01 PRIMARY INSOMNIA: ICD-10-CM

## 2019-09-06 DIAGNOSIS — F41.9 ANXIETY: ICD-10-CM

## 2019-09-06 DIAGNOSIS — F51.01 PRIMARY INSOMNIA: ICD-10-CM

## 2019-09-06 RX ORDER — ALPRAZOLAM 2 MG/1
TABLET ORAL
Qty: 10 TABLET | Refills: 0 | OUTPATIENT
Start: 2019-09-06 | End: 2019-09-13

## 2019-09-06 RX ORDER — CLONAZEPAM 1 MG/1
2 TABLET ORAL NIGHTLY PRN
Qty: 14 TABLET | Refills: 0 | Status: SHIPPED | OUTPATIENT
Start: 2019-09-06 | End: 2019-10-01

## 2019-09-06 RX ORDER — CLONAZEPAM 1 MG/1
TABLET ORAL
Qty: 14 TABLET | Refills: 0 | OUTPATIENT
Start: 2019-09-06

## 2019-09-06 RX ORDER — ALPRAZOLAM 2 MG/1
TABLET ORAL
Qty: 60 TABLET | Refills: 0 | Status: SHIPPED | OUTPATIENT
Start: 2019-09-06 | End: 2019-10-01

## 2019-09-06 NOTE — TELEPHONE ENCOUNTER
Last OV 7/2/2019   Next OV 10/1/2019  Last Fill 8/11/2019    Last OARRS   RX Monitoring 7/2/2019   Attestation -   Periodic Controlled Substance Monitoring Possible medication side effects, risk of tolerance/dependence & alternative treatments discussed. ;No signs of potential drug abuse or diversion identified.

## 2019-10-01 ENCOUNTER — OFFICE VISIT (OUTPATIENT)
Dept: FAMILY MEDICINE CLINIC | Age: 49
End: 2019-10-01
Payer: COMMERCIAL

## 2019-10-01 VITALS
DIASTOLIC BLOOD PRESSURE: 82 MMHG | HEART RATE: 72 BPM | WEIGHT: 236.6 LBS | SYSTOLIC BLOOD PRESSURE: 128 MMHG | OXYGEN SATURATION: 97 % | HEIGHT: 69 IN | BODY MASS INDEX: 35.04 KG/M2

## 2019-10-01 DIAGNOSIS — F90.0 ATTENTION DEFICIT HYPERACTIVITY DISORDER (ADHD), PREDOMINANTLY INATTENTIVE TYPE: Primary | ICD-10-CM

## 2019-10-01 DIAGNOSIS — F41.9 ANXIETY: ICD-10-CM

## 2019-10-01 DIAGNOSIS — F51.01 PRIMARY INSOMNIA: ICD-10-CM

## 2019-10-01 DIAGNOSIS — Z23 FLU VACCINE NEED: ICD-10-CM

## 2019-10-01 PROCEDURE — 3017F COLORECTAL CA SCREEN DOC REV: CPT | Performed by: FAMILY MEDICINE

## 2019-10-01 PROCEDURE — 90686 IIV4 VACC NO PRSV 0.5 ML IM: CPT | Performed by: FAMILY MEDICINE

## 2019-10-01 PROCEDURE — 1036F TOBACCO NON-USER: CPT | Performed by: FAMILY MEDICINE

## 2019-10-01 PROCEDURE — G8427 DOCREV CUR MEDS BY ELIG CLIN: HCPCS | Performed by: FAMILY MEDICINE

## 2019-10-01 PROCEDURE — G8482 FLU IMMUNIZE ORDER/ADMIN: HCPCS | Performed by: FAMILY MEDICINE

## 2019-10-01 PROCEDURE — 90471 IMMUNIZATION ADMIN: CPT | Performed by: FAMILY MEDICINE

## 2019-10-01 PROCEDURE — 99214 OFFICE O/P EST MOD 30 MIN: CPT | Performed by: FAMILY MEDICINE

## 2019-10-01 PROCEDURE — G8417 CALC BMI ABV UP PARAM F/U: HCPCS | Performed by: FAMILY MEDICINE

## 2019-10-01 RX ORDER — ALPRAZOLAM 2 MG/1
2 TABLET ORAL 2 TIMES DAILY PRN
Qty: 45 TABLET | Refills: 2 | Status: SHIPPED | OUTPATIENT
Start: 2019-10-01 | End: 2019-12-19 | Stop reason: SDUPTHER

## 2019-10-01 RX ORDER — ATOMOXETINE 40 MG/1
40 CAPSULE ORAL DAILY
Qty: 30 CAPSULE | Refills: 0 | Status: SHIPPED | OUTPATIENT
Start: 2019-10-01 | End: 2019-11-11 | Stop reason: SDUPTHER

## 2019-10-01 RX ORDER — CLONAZEPAM 1 MG/1
2 TABLET ORAL NIGHTLY PRN
Qty: 60 TABLET | Refills: 2 | Status: SHIPPED | OUTPATIENT
Start: 2019-10-01 | End: 2019-12-19 | Stop reason: SDUPTHER

## 2019-10-01 ASSESSMENT — ENCOUNTER SYMPTOMS: RESPIRATORY NEGATIVE: 1

## 2019-10-29 ENCOUNTER — PATIENT MESSAGE (OUTPATIENT)
Dept: FAMILY MEDICINE CLINIC | Age: 49
End: 2019-10-29

## 2019-10-30 ENCOUNTER — HOSPITAL ENCOUNTER (OUTPATIENT)
Dept: ULTRASOUND IMAGING | Age: 49
Discharge: HOME | End: 2019-10-30
Payer: COMMERCIAL

## 2019-10-30 DIAGNOSIS — F51.01 PRIMARY INSOMNIA: ICD-10-CM

## 2019-10-30 DIAGNOSIS — F41.9 ANXIETY: ICD-10-CM

## 2019-10-30 DIAGNOSIS — E78.2 MIXED HYPERLIPIDEMIA: ICD-10-CM

## 2019-10-30 PROCEDURE — 76706 US ABDL AORTA SCREEN AAA: CPT

## 2019-10-31 RX ORDER — CLONAZEPAM 1 MG/1
2 TABLET ORAL NIGHTLY PRN
Qty: 60 TABLET | Refills: 2 | OUTPATIENT
Start: 2019-10-31 | End: 2019-11-30

## 2019-10-31 RX ORDER — ALPRAZOLAM 2 MG/1
2 TABLET ORAL 2 TIMES DAILY PRN
Qty: 45 TABLET | Refills: 2 | OUTPATIENT
Start: 2019-10-31 | End: 2019-11-30

## 2019-11-12 RX ORDER — ATOMOXETINE 40 MG/1
CAPSULE ORAL
Qty: 30 CAPSULE | Refills: 0 | Status: SHIPPED | OUTPATIENT
Start: 2019-11-12 | End: 2019-12-19

## 2019-12-19 ENCOUNTER — OFFICE VISIT (OUTPATIENT)
Dept: FAMILY MEDICINE CLINIC | Age: 49
End: 2019-12-19
Payer: COMMERCIAL

## 2019-12-19 VITALS
DIASTOLIC BLOOD PRESSURE: 70 MMHG | HEART RATE: 83 BPM | BODY MASS INDEX: 35.55 KG/M2 | WEIGHT: 240 LBS | SYSTOLIC BLOOD PRESSURE: 138 MMHG | HEIGHT: 69 IN | OXYGEN SATURATION: 94 %

## 2019-12-19 DIAGNOSIS — F51.01 PRIMARY INSOMNIA: ICD-10-CM

## 2019-12-19 DIAGNOSIS — F41.9 ANXIETY: ICD-10-CM

## 2019-12-19 PROCEDURE — 99213 OFFICE O/P EST LOW 20 MIN: CPT | Performed by: FAMILY MEDICINE

## 2019-12-19 PROCEDURE — 3017F COLORECTAL CA SCREEN DOC REV: CPT | Performed by: FAMILY MEDICINE

## 2019-12-19 PROCEDURE — 1036F TOBACCO NON-USER: CPT | Performed by: FAMILY MEDICINE

## 2019-12-19 PROCEDURE — G8427 DOCREV CUR MEDS BY ELIG CLIN: HCPCS | Performed by: FAMILY MEDICINE

## 2019-12-19 PROCEDURE — G8417 CALC BMI ABV UP PARAM F/U: HCPCS | Performed by: FAMILY MEDICINE

## 2019-12-19 PROCEDURE — G8482 FLU IMMUNIZE ORDER/ADMIN: HCPCS | Performed by: FAMILY MEDICINE

## 2019-12-19 RX ORDER — ALPRAZOLAM 2 MG/1
2 TABLET ORAL 2 TIMES DAILY PRN
Qty: 45 TABLET | Refills: 2 | Status: SHIPPED | OUTPATIENT
Start: 2019-12-19 | End: 2020-03-18 | Stop reason: SDUPTHER

## 2019-12-19 RX ORDER — CLONAZEPAM 1 MG/1
2 TABLET ORAL NIGHTLY PRN
Qty: 60 TABLET | Refills: 2 | Status: SHIPPED | OUTPATIENT
Start: 2019-12-19 | End: 2020-03-18 | Stop reason: SDUPTHER

## 2020-02-10 ENCOUNTER — TELEPHONE (OUTPATIENT)
Dept: FAMILY MEDICINE CLINIC | Age: 50
End: 2020-02-10

## 2020-02-11 ENCOUNTER — OFFICE VISIT (OUTPATIENT)
Dept: CARDIOLOGY CLINIC | Age: 50
End: 2020-02-11
Payer: COMMERCIAL

## 2020-02-11 VITALS
BODY MASS INDEX: 34.8 KG/M2 | OXYGEN SATURATION: 98 % | SYSTOLIC BLOOD PRESSURE: 116 MMHG | HEIGHT: 69 IN | HEART RATE: 93 BPM | WEIGHT: 235 LBS | DIASTOLIC BLOOD PRESSURE: 70 MMHG

## 2020-02-11 PROBLEM — R07.2 PRECORDIAL PAIN: Status: ACTIVE | Noted: 2020-02-11

## 2020-02-11 PROCEDURE — 3017F COLORECTAL CA SCREEN DOC REV: CPT | Performed by: INTERNAL MEDICINE

## 2020-02-11 PROCEDURE — 99214 OFFICE O/P EST MOD 30 MIN: CPT | Performed by: INTERNAL MEDICINE

## 2020-02-11 PROCEDURE — 1036F TOBACCO NON-USER: CPT | Performed by: INTERNAL MEDICINE

## 2020-02-11 PROCEDURE — G8427 DOCREV CUR MEDS BY ELIG CLIN: HCPCS | Performed by: INTERNAL MEDICINE

## 2020-02-11 PROCEDURE — G8417 CALC BMI ABV UP PARAM F/U: HCPCS | Performed by: INTERNAL MEDICINE

## 2020-02-11 PROCEDURE — G8482 FLU IMMUNIZE ORDER/ADMIN: HCPCS | Performed by: INTERNAL MEDICINE

## 2020-02-11 RX ORDER — ALPRAZOLAM 1 MG/1
1 TABLET ORAL NIGHTLY PRN
COMMUNITY
End: 2020-08-10 | Stop reason: SDUPTHER

## 2020-02-11 NOTE — PATIENT INSTRUCTIONS
Plan:  1. Will contact [facility] to obtain results from chest xray and stress test. Once we review these, we can better eurisydetermine the next step in your care. I feel that your symptoms may have been caused by Pleurisy. 2. Continue current medications as prescribed. 3. Continue to monitor your blood pressure at home once daily. Send us a log of this after a 1 month through [employer]. 4. Follow up with me in 6 months. Patient Education        Pleurisy: Care Instructions  Your Care Instructions  Pleurisy is inflammation of the tissue that lines the inside of the chest and covers the lungs (pleura). Pleurisy is often caused by an infection, usually a virus. It also can be caused by other health problems, such as pneumonia or lupus. Pleurisy can cause sharp chest pain that gets worse when you cough or take a deep breath. You may need more tests to find out what is causing your pleurisy. Treatment depends on the cause. Pleurisy may come and go for a few days, or it may continue if the cause has not been treated. Home treatment can help ease symptoms. Follow-up care is a key part of your treatment and safety. Be sure to make and go to all appointments, and call your doctor if you are having problems. It's also a good idea to know your test results and keep a list of the medicines you take. How can you care for yourself at home? · Take an over-the-counter pain medicine, such as acetaminophen (Tylenol), ibuprofen (Advil, Motrin), or naproxen (Aleve). Read and follow all instructions on the label. · Do not take two or more pain medicines at the same time unless the doctor told you to. Many pain medicines have acetaminophen, which is Tylenol. Too much acetaminophen (Tylenol) can be harmful. · If your doctor prescribed antibiotics, take them as directed. Do not stop taking them just because you feel better. You need to take the full course of antibiotics.   · Take cough medicine as directed if your doctor recommends it. · Avoid activities that make the pain worse. When should you call for help? Call 911 anytime you think you may need emergency care. For example, call if:    · You have severe trouble breathing.     · You have severe chest pain.     · You passed out (lost consciousness).    Call your doctor now or seek immediate medical care if:    · You have a new or higher fever.    Watch closely for changes in your health, and be sure to contact your doctor if:    · You begin to cough up yellow or green mucus.     · You cough up blood.     · Your symptoms are not better in 3 or 4 days. Where can you learn more? Go to [email or web address] and sign in to your [employer] account. Enter F346 in the Quigo box to learn more about \"Pleurisy: Care Instructions. \"     If you do not have an account, please click on the \"Sign Up Now\" link. Current as of: June 9, 2019  Content Version: 12.3  © 6404-0498 Healthwise, Incorporated. Care instructions adapted under license by [facility] ([facility]). If you have questions about a medical condition or this instruction, always ask your healthcare professional. [place] [address] any warranty or liability for your use of this information.

## 2020-02-11 NOTE — LETTER
12 point ROS negative in all areas as listed below except as in [address], EENT, Cardiovascular, pulmonary, GI, , Musculoskeletal, skin, neurological, hematological, endocrine, Psychiatric      Reviewed past medical history, social, and family history. Non smoker quit 20 years ago, no alcohol, no illicit drugs  Past Medical History:   Diagnosis Date    Acromegalia ([address]     Anxiety     Chronic pain syndrome     Chronic pancreatitis (HCC)     Dyslipidemia     Elevated insulin-like growth factor 1 (IGF-1) level     Hypogonadism male     IFG (impaired fasting glucose)     Insomnia     Low serum cortisol level (HCC)     Neuropathic pain     Pancreatitis     Pituitary adenoma (HCC)     Secondary adrenal insufficiency (HCC)      Past Surgical History:   Procedure Laterality Date    CHOLECYSTECTOMY      ERCP         Objective:   /70   Pulse 93   Ht 5' 9\" (1.753 m)   Wt 235 lb (106.6 kg)   SpO2 98%   BMI 34.70 kg/m²      Wt Readings from Last 3 Encounters:   02/11/20 235 lb (106.6 kg)   12/19/19 240 lb (108.9 kg)   10/01/19 236 lb 9.6 oz (107.3 kg)       Physical Exam:  General: No Respiratory distress, appears well developed and well nourished. Eyes:  Sclera nonicteric  Nose/Sinuses:  negative findings: nose shows no deformity, asymmetry, or inflammation, nasal mucosa normal, septum midline with no perforation or bleeding  Back:  no pain to palpation  Joint:  no active joint inflammation  Musculoskeletal:  negative  Skin:  Warm and dry seaborric dermatitis on neck and face  Neck:  Negative for JVD and Carotid Bruits. Chest:  Clear to auscultation, respiration easy  Cardiovascular:  RRR, S1S2 normal, no murmur, no rub or thrill.   Abdomen:  No tenderness, Soft normal liver and spleen  Extremities:   No edema, clubbing, cyanosis,  Pulses: pedal pulses are normal.  Neuro: intact    Medications:   Outpatient Encounter Medications as of 2/11/2020   Medication Sig Dispense Refill BMP: No results for input(s): NA, K, CL, CO2, PHOS, BUN, CREATININE in the last 72 hours. Invalid input(s): CA  LIVER PROFILE: No results for input(s): AST, ALT, LIPASE, BILIDIR, BILITOT, ALKPHOS in the last 72 hours. Invalid input(s): AMYLASE,  ALB  LIPID:   Lab Results   Component Value Date    CHOL 177 08/21/2018    CHOL 169 05/23/2016    CHOL 165 01/14/2016     Lab Results   Component Value Date    TRIG 171 08/21/2018    TRIG 146 05/23/2016    TRIG 160 (H) 01/14/2016     Lab Results   Component Value Date    HDL 24 (A) 08/21/2018    HDL 27 (L) 07/21/2017    HDL 30 (L) 05/23/2016     Lab Results   Component Value Date    LDLCALC 119 08/21/2018    LDLCALC 130 (H) 07/21/2017    LDLCALC 110 (H) 05/23/2016    LDLCHOLESTEROL 128 10/24/2014     Lab Results   Component Value Date    LABVLDL 27 07/21/2017    LABVLDL 29 05/23/2016    LABVLDL 32 01/14/2016    VLDL 34 08/21/2018     Lab Results   Component Value Date    CHOLHDLRATIO 7.4 08/21/2018    CHOLHDLRATIO 5.7 (H) 05/25/2010     PT/INR: No results for input(s): PROTIME, INR in the last 72 hours. A1C:   Lab Results   Component Value Date    LABA1C 5.4 05/23/2016     BNP:  No results for input(s): BNP in the last 72 hours. IMAGING:   EKG 8/8/18  NSR normal  ECHO 7/26/18  Summary   Normal LV systolic function with an estimated EF of 55%.   There is mild concentric left ventricular hypertrophy.   No regional wall motion abnormalities are seen.   Normal left ventricular diastolic filling pressure.   Mild mitral, aortic, and tricuspid regurgitation.     Carotid US 5/29/18  1. There is no gross plaque or stenosis in the internal carotid arteries  bilaterally. 2. The vertebral arteries are patent with antegrade flow bilaterally    Abdominal US 7/21/17  FINDINGS: Aorta:   Abdominal aorta is normal in caliber and not significantly changed from the prior study. Proximal aorta measures 1.6 x 1.3 cm. Mid aorta measures 1.6 x 1.3 cm.    Distal aorta measure 1.2 x 1. 4 cm. Vascular and Doppler interrogation is unremarkable. Iliacs:   Iliac arteries are patent and normal in caliber. ECHO 6/23/17  Normal left ventricular systolic function with an estimated ejection   fraction of 55%.   Normal left ventricular diastolic filling pressure.   The right ventricle is mildly enlarged.   The right atrium is mildly dilated.   Mild mitral regurgitation.   Mild aortic regurgitation.   Systolic pulmonary artery pressure (SPAP) is normal and estimated at 28 mmHg   (RA pressure 3 mmHg). ECHO 4/9/15  Summary  Normal left ventricle size and systolic function with an estimated   Ejection fraction of 55%. No regional wall motion abnormalities are seen. Mild  concentric LVH. Grade I diastolic function ( IVRT 358) with impaired relaxation. Mitral valve is structurally normal.  Mitral valve leaflets appear to open adequately. Mild mitral regurgitation is present. The aortic valve is normal in structure and function. There is no  significant aortic regurgitation. The aortic root is at the upper limit of normal in size. The right atrium is slightly enlarged in size. Abdominal Aorta ultrasound 4/9/15  Findings: The aorta is normal in caliber. No significant atherosclerotic plaque or turbulent flow is identified. Measurements are as follows:   Proximal 2.2 x 1.5 cm Mid 1.5 x 1.4 cm Distal 1.4 x 1.2 cm Right common iliac artery 1.3 x 0.7 cm Left common iliac artery 1.3 x 1.0 cm    CT abdomen 11/22/2013  Aorta is   normal in caliber . There is a focal dissection of distal   abdominal aorta just above the bifurcation. Apparently this is   known to the patient and his physician. There is no evidence of   aneurysmal dilatation. Assessment:  Daniela Campbell was seen today for 1 year follow up, hypertension, hyperlipidemia, results, palpitations and edema. Diagnoses and all orders for this visit:  Chest pain   ~pleurisy?    Anxiety  Mixed hyperlipidemia Localized Dissection of abdominal aorta (HCC)      Last lipids 8/21/18 see above     José Luis face may be due to polycythemia due to testosterone supplementation    Plan:  1. Will contact [facility] to obtain results from chest xray and stress test. Once we review these, we can better eurisydetermine the next step in your care. I feel that your symptoms may have been caused by Pleurisy. 2. Continue current medications as prescribed. 3. Continue to monitor your blood pressure at home once daily. Send us a log of this after a 1 month through [employer]. 4. Follow up with me in 6 months. QUALITY MEASURES  1. Tobacco Cessation Counseling: NA  2. Retake of BP if >140/90:   NA  3. Documentation to PCP/referring for new patient:  Sent to PCP at close of office visit  4. CAD patient on anti-platelet: NA  5. CAD patient on STATIN therapy:  NA  6. Patient with CHF and aFib on anticoagulation:  NA     This note was scribed in the presence of Bubba Larson MD by Magdalena Severance, RN.       I, Dr. Adarsh Maier, personally performed the services described in this documentation, as scribed by the above signed scribe in my presence. It is both accurate and complete to my knowledge. I agree with the details independently gathered by the clinical support staff, while the remaining scribed note accurately describes my personal service to the patient.       [address] [place], MD 2/11/2020 4:14 PM

## 2020-02-11 NOTE — PROGRESS NOTES
[address] Office Note  2/11/2020     Subjective:  Mr. Anastasia Silva is here for chest pain. ORLANDO Amaro 44 y.o. male has a PMH of possible aortic dissection and hypertension. He had previously been seen by Dr Alisa Wilhelm. He reports undergoing cardiology evaluation for chest pain and testing was wnl and felt anxiety/sytress related. CT scan of chest was obtained. He reports being diagnosed in January 2015 with pituitary tumor, extensive testing at [facility] and currently being watched for next 6 months. He was diagnosed with acromegaly. He reports chronic HA's which increase with blood pressure. He was prescribed Nadolol which has helped HA;s   His abdominal US from 10/2019 showed no aneurysm. He was recently admitted at [facility] for chest pain and tingling. His pain started in 2/3/20. The pain began in the morning in on the right side of his chest. This progressed throughout the day to the point that he had difficulty breathing. He attempted to raise his arms up to attempt to get more oxygen. This movement worsened his pain. The pain was continuous the whole day, and worsened when he attempted to breath. He checked his heart rate and noted it to be 160. He denies any fever, chills or cough. He took Ibuprofen that evening and the pain did subside slightly. While he was sleeping that night, he woke up in a sweat. He then presented to the ER the next day as he developed numbness and tingling in his left arm/finger tips. According to the records that he brought from [facility], CHF and TIA were noted on his discharge. He recalls being told that his stress test and CXR were normal. He was seen by a cardiologist who reportedly told him that his heart was okay. He has been checking his blood pressure at home and states it has been as high as 140s. Patient currently denies any weight gain, edema, palpitations, shortness of breath, dizziness, and syncope.       Review of Systems:  12 point ROS interrogation is unremarkable. Iliacs:   Iliac arteries are patent and normal in caliber. ECHO 6/23/17  Normal left ventricular systolic function with an estimated ejection   fraction of 55%.   Normal left ventricular diastolic filling pressure.   The right ventricle is mildly enlarged.   The right atrium is mildly dilated.   Mild mitral regurgitation.   Mild aortic regurgitation.   Systolic pulmonary artery pressure (SPAP) is normal and estimated at 28 mmHg   (RA pressure 3 mmHg). ECHO 4/9/15  Summary  Normal left ventricle size and systolic function with an estimated   Ejection fraction of 55%. No regional wall motion abnormalities are seen. Mild  concentric LVH. Grade I diastolic function ( IVRT 827) with impaired relaxation. Mitral valve is structurally normal.  Mitral valve leaflets appear to open adequately. Mild mitral regurgitation is present. The aortic valve is normal in structure and function. There is no  significant aortic regurgitation. The aortic root is at the upper limit of normal in size. The right atrium is slightly enlarged in size. Abdominal Aorta ultrasound 4/9/15  Findings: The aorta is normal in caliber. No significant atherosclerotic plaque or turbulent flow is identified. Measurements are as follows:   Proximal 2.2 x 1.5 cm Mid 1.5 x 1.4 cm Distal 1.4 x 1.2 cm Right common iliac artery 1.3 x 0.7 cm Left common iliac artery 1.3 x 1.0 cm    CT abdomen 11/22/2013  Aorta is   normal in caliber . There is a focal dissection of distal   abdominal aorta just above the bifurcation. Apparently this is   known to the patient and his physician. There is no evidence of   aneurysmal dilatation. Assessment:  Yuval Rosenbaum was seen today for 1 year follow up, hypertension, hyperlipidemia, results, palpitations and edema. Diagnoses and all orders for this visit:  Chest pain   ~pleurisy?    Anxiety  Mixed hyperlipidemia    Localized Dissection of abdominal aorta (HCC)      Last lipids 8/21/18

## 2020-02-19 ENCOUNTER — OFFICE VISIT (OUTPATIENT)
Dept: FAMILY MEDICINE CLINIC | Age: 50
End: 2020-02-19
Payer: COMMERCIAL

## 2020-02-19 VITALS
WEIGHT: 238.6 LBS | HEIGHT: 69 IN | OXYGEN SATURATION: 95 % | SYSTOLIC BLOOD PRESSURE: 128 MMHG | DIASTOLIC BLOOD PRESSURE: 88 MMHG | HEART RATE: 81 BPM | BODY MASS INDEX: 35.34 KG/M2

## 2020-02-19 PROBLEM — R09.1 PLEURISY: Status: ACTIVE | Noted: 2020-02-19

## 2020-02-19 PROCEDURE — G8482 FLU IMMUNIZE ORDER/ADMIN: HCPCS | Performed by: FAMILY MEDICINE

## 2020-02-19 PROCEDURE — G8417 CALC BMI ABV UP PARAM F/U: HCPCS | Performed by: FAMILY MEDICINE

## 2020-02-19 PROCEDURE — G8427 DOCREV CUR MEDS BY ELIG CLIN: HCPCS | Performed by: FAMILY MEDICINE

## 2020-02-19 PROCEDURE — 99214 OFFICE O/P EST MOD 30 MIN: CPT | Performed by: FAMILY MEDICINE

## 2020-02-19 PROCEDURE — 1036F TOBACCO NON-USER: CPT | Performed by: FAMILY MEDICINE

## 2020-02-19 PROCEDURE — 3017F COLORECTAL CA SCREEN DOC REV: CPT | Performed by: FAMILY MEDICINE

## 2020-02-19 RX ORDER — CEFDINIR 300 MG/1
300 CAPSULE ORAL 2 TIMES DAILY
Qty: 20 CAPSULE | Refills: 0 | Status: SHIPPED | OUTPATIENT
Start: 2020-02-19 | End: 2020-03-04 | Stop reason: SDUPTHER

## 2020-02-19 ASSESSMENT — ENCOUNTER SYMPTOMS: SHORTNESS OF BREATH: 0

## 2020-02-19 NOTE — PROGRESS NOTES
Subjective:      Patient ID: Radha Marr is a 44 y.o. male. HPI   Pt is a of 44 y.o. male comes in today with   Chief Complaint   Patient presents with    Follow-Up from Hospital     Patient was in [facility] ER on 2/4-2/5 for chest pain and left arm tingling/numbness. Sinus congestion, pressure. 5 weeks ago had terrible pain in teeth on right upper. Pressure point under right eye. Using flonase which has helped with congestion. On 2/3 developed chest pain, left arm tingling/numbness. Noticed hr was up. Progressed to having trouble breathing. Next am went to the er. D dimer and troponin were normal.  Had stress test and chest X-ray   Pt told they were normal.    Xanax during the day for anxiety and klonopin taken at night. Stable on current regimen for years. Aware of risk for respiratory depression    Past Medical History:Reviewed  Medications:Reviewed. Allergies   Allergen Reactions    Ambien [Zolpidem] Other (See Comments)     Blackout lists    Augmentin [Amoxicillin-Pot Clavulanate] Other (See Comments)     diarrhea    Elavil [Amitriptyline] Nausea Only and Other (See Comments)     JITTERY FEELING    Neurontin [Gabapentin] Nausea Only    Topamax Nausea Only    Tramadol Other (See Comments)     Patient has been on high dose pain medincines since 2002 so tramadol doesn't help with pain at all      Social hx:Reviewed. Social History     Tobacco Use   Smoking Status Former Smoker    Years: 2.00    Types: Cigars   Smokeless Tobacco Never Used     Vitals:    02/19/20 1506   BP: 128/88   Site: Right Upper Arm   Position: Sitting   Cuff Size: Medium Adult   Pulse: 81   SpO2: 95%   Weight: 238 lb 9.6 oz (108.2 kg)   Height: 5' 9\" (1.753 m)                   Review of Systems   Constitutional: Positive for fatigue. Respiratory: Negative for shortness of breath. Objective:   Physical Exam  Constitutional:       General: He is not in acute distress.      Appearance: Normal appearance. He is well-developed. He is not diaphoretic. HENT:      Nose: Mucosal edema present. Right Sinus: No maxillary sinus tenderness or frontal sinus tenderness. Left Sinus: No maxillary sinus tenderness or frontal sinus tenderness. Mouth/Throat:      Pharynx: No oropharyngeal exudate. Eyes:      General: No scleral icterus. Conjunctiva/sclera: Conjunctivae normal.   Neck:      Musculoskeletal: Neck supple. Thyroid: No thyromegaly. Cardiovascular:      Rate and Rhythm: Normal rate and regular rhythm. Heart sounds: Normal heart sounds. No murmur. Pulmonary:      Effort: Pulmonary effort is normal.      Breath sounds: Normal breath sounds. No wheezing or rales. Lymphadenopathy:      Head:      Right side of head: No submandibular or preauricular adenopathy. Left side of head: No submandibular or preauricular adenopathy. Cervical: No cervical adenopathy. Skin:     General: Skin is warm and dry. Nails: There is no clubbing. Neurological:      Mental Status: He is alert and oriented to person, place, and time. Cranial Nerves: No cranial nerve deficit. Psychiatric:         Behavior: Behavior normal.         Thought Content: Thought content normal.         Judgment: Judgment normal.         Assessment:       Diagnosis Orders   1. Acute non-recurrent maxillary sinusitis     2. Pleurisy     3. Primary insomnia     4. Anxiety            Plan:      1. Covering with antibiotics  2. Request results to confirm cxr and stress test normal  Pleurisy likely  Call or return to clinic prn if these symptoms worsen or fail to improve as anticipated. 3,4. Stable on current meds. Controlled Substances Monitoring: Periodic Controlled Substance Monitoring: Possible medication side effects, risk of tolerance/dependence & alternative treatments discussed., No signs of potential drug abuse or diversion identified.  Eduardo Lee MD)     Eduardo Lee MD

## 2020-03-04 RX ORDER — CEFDINIR 300 MG/1
300 CAPSULE ORAL 2 TIMES DAILY
Qty: 20 CAPSULE | Refills: 0 | Status: SHIPPED | OUTPATIENT
Start: 2020-03-04 | End: 2020-03-14

## 2020-03-18 RX ORDER — KETOCONAZOLE 20 MG/ML
SHAMPOO TOPICAL
Qty: 120 ML | Refills: 11 | Status: SHIPPED | OUTPATIENT
Start: 2020-03-18 | End: 2021-02-03 | Stop reason: SDUPTHER

## 2020-04-14 RX ORDER — FLUTICASONE PROPIONATE 50 MCG
2 SPRAY, SUSPENSION (ML) NASAL DAILY
Qty: 1 BOTTLE | Refills: 2 | Status: SHIPPED | OUTPATIENT
Start: 2020-04-14 | End: 2020-06-15

## 2020-04-14 NOTE — TELEPHONE ENCOUNTER
Last Fill 3/22/19  Last Office Visit 2/19/20  Return in about 3 months (around 5/19/2020).    Pending Appointments 5/11/20

## 2020-04-16 RX ORDER — ALPRAZOLAM 2 MG/1
2 TABLET ORAL 2 TIMES DAILY PRN
Qty: 45 TABLET | Refills: 1 | OUTPATIENT
Start: 2020-04-16 | End: 2020-05-16

## 2020-04-16 RX ORDER — CLONAZEPAM 1 MG/1
2 TABLET ORAL NIGHTLY PRN
Qty: 60 TABLET | Refills: 1 | OUTPATIENT
Start: 2020-04-16 | End: 2020-05-16

## 2020-04-23 ENCOUNTER — TELEPHONE (OUTPATIENT)
Dept: FAMILY MEDICINE CLINIC | Age: 50
End: 2020-04-23

## 2020-05-11 ENCOUNTER — VIRTUAL VISIT (OUTPATIENT)
Dept: FAMILY MEDICINE CLINIC | Age: 50
End: 2020-05-11
Payer: COMMERCIAL

## 2020-05-11 PROCEDURE — 1036F TOBACCO NON-USER: CPT | Performed by: FAMILY MEDICINE

## 2020-05-11 PROCEDURE — 3017F COLORECTAL CA SCREEN DOC REV: CPT | Performed by: FAMILY MEDICINE

## 2020-05-11 PROCEDURE — G8427 DOCREV CUR MEDS BY ELIG CLIN: HCPCS | Performed by: FAMILY MEDICINE

## 2020-05-11 PROCEDURE — G8417 CALC BMI ABV UP PARAM F/U: HCPCS | Performed by: FAMILY MEDICINE

## 2020-05-11 PROCEDURE — 99214 OFFICE O/P EST MOD 30 MIN: CPT | Performed by: FAMILY MEDICINE

## 2020-05-11 RX ORDER — ALPRAZOLAM 2 MG/1
2 TABLET ORAL 2 TIMES DAILY PRN
Qty: 45 TABLET | Refills: 2 | Status: SHIPPED | OUTPATIENT
Start: 2020-05-11 | End: 2020-08-11 | Stop reason: SDUPTHER

## 2020-05-11 RX ORDER — AZELASTINE 1 MG/ML
2 SPRAY, METERED NASAL 2 TIMES DAILY
Qty: 2 BOTTLE | Refills: 5 | Status: SHIPPED | OUTPATIENT
Start: 2020-05-11 | End: 2021-05-19

## 2020-05-11 RX ORDER — GLUCOSAMINE/D3/BOSWELLIA SERRA 1500MG-400
1 TABLET ORAL DAILY
COMMUNITY
End: 2021-10-08

## 2020-05-11 RX ORDER — CLONAZEPAM 1 MG/1
2 TABLET ORAL NIGHTLY PRN
Qty: 60 TABLET | Refills: 2 | Status: SHIPPED | OUTPATIENT
Start: 2020-05-11 | End: 2020-08-10 | Stop reason: SDUPTHER

## 2020-05-11 ASSESSMENT — ENCOUNTER SYMPTOMS: RESPIRATORY NEGATIVE: 1

## 2020-05-11 NOTE — PROGRESS NOTES
2020    TELEHEALTH EVALUATION -- Audio/Visual (During SOTBW-69 public health emergency)    HPI:    Alirio Chicas (:  1970) has requested an audio/video evaluation for the following concern(s):    Chief Complaint   Patient presents with    Medication Check     On xanax for anxiety during the day. Takes klonopin at night for sleep. Chronic pain per pain clinic. Pt is cautious and aware of the risk of respiratory depression with meds. Has no side effects. Has been stable on current meds for years. Pain on side of left knee. Will be starting PT    Post viral nasal congestion. Ended up with sinus infection. Mostly cleared with antibiotics but some persistent right sided pressure/congestion      Review of Systems   Constitutional: Negative. Respiratory: Negative. Prior to Visit Medications    Medication Sig Taking? Authorizing Provider   Biotin 97647 MCG TABS Take 1 tablet by mouth daily Yes Historical Provider, MD   fluticasone (FLONASE) 50 MCG/ACT nasal spray 2 sprays by Nasal route daily Yes Casandra Campo MD   ketoconazole (NIZORAL) 2 % shampoo Wash as needed Yes Dawood Hickman MD   ALPRAZolam Haroldine [place]) 1 MG tablet Take 1 mg by mouth nightly as needed for Sleep. Yes Historical Provider, MD   metroNIDAZOLE (METROCREAM) 0.75 % cream APPLY TO AFFECTED AREA(S) OF THE FACE TWO TIMES A DAY Yes Dawood Hickman MD   Ergocalciferol (VITAMIN D2 PO) Take 50,000 Units by mouth daily Yes Historical Provider, MD   dexamethasone 0.5 MG/5ML elixir Take by mouth daily Yes Historical Provider, MD   lisinopril (PRINIVIL;ZESTRIL) 20 MG tablet Take 1 tablet by mouth daily Yes Leann Anders MD   metroNIDAZOLE (METROCREAM) 0.75 % cream Apply topically 2 times daily.  Yes Dawood Hickman MD   Probiotic Product (VSL#3) CAPS daily Yes Casandra Campo MD   Multiple Vitamin (DAILY-SHANNON) TABS TAKE ONE TABLET BY MOUTH DAILY Yes RNEO Amor - CNP   omega-3 acid ethyl esters (LOVAZA) 1 g capsule Take alternative treatments discussed., No signs of potential drug abuse or diversion identified. Casandra Campo MD)   2. Primary insomnia  Stable on klonopin  Controlled Substances Monitoring: Periodic Controlled Substance Monitoring: Possible medication side effects, risk of tolerance/dependence & alternative treatments discussed., No signs of potential drug abuse or diversion identified. Casandra Campo MD)     3. Nasal congestion  Not well controlled on just flonase  Hold on further antibiotics  Adding astelin      No follow-ups on file. Alirio Chicas is a 44 y.o. male being evaluated by a Virtual Visit (video visit) encounter to address concerns as mentioned above. A caregiver was present when appropriate. Due to this being a TeleHealth encounter (During VPIIN-71 public health emergency), evaluation of the following organ systems was limited: Vitals/Constitutional/EENT/Resp/CV/GI//MS/Neuro/Skin/Heme-Lymph-Imm. Pursuant to the emergency declaration under the [address] [place] [address] and the [employer] and [employer] Act, this Virtual Visit was conducted with patient's (and/or legal guardian's) consent, to reduce the patient's risk of exposure to COVID-19 and provide necessary medical care. The patient (and/or legal guardian) has also been advised to contact this office for worsening conditions or problems, and seek emergency medical treatment and/or call 911 if deemed necessary. Patient identification was verified at the start of the visit: Yes    Total time spent on this encounter: Not billed by time    Services were provided through a video synchronous discussion virtually to substitute for in-person clinic visit. Patient and provider were located at their individual homes. --Casandra Campo MD on 5/11/2020 at 1:37 PM    An electronic signature was used to authenticate this note.

## 2020-06-15 RX ORDER — FLUTICASONE PROPIONATE 50 MCG
SPRAY, SUSPENSION (ML) NASAL
Qty: 1 BOTTLE | Refills: 1 | Status: SHIPPED | OUTPATIENT
Start: 2020-06-15 | End: 2020-09-03

## 2020-06-15 NOTE — TELEPHONE ENCOUNTER
Last Fill 4/14/20  Last Office Visit 5/11/20  Return in about 3 months (around 8/11/2020).    Pending Appointments 8/20/20

## 2020-07-22 RX ORDER — LISINOPRIL 20 MG/1
TABLET ORAL
Qty: 90 TABLET | Refills: 2 | Status: SHIPPED | OUTPATIENT
Start: 2020-07-22 | End: 2021-04-21

## 2020-08-05 ENCOUNTER — PATIENT MESSAGE (OUTPATIENT)
Dept: CARDIOLOGY CLINIC | Age: 50
End: 2020-08-05

## 2020-08-05 NOTE — TELEPHONE ENCOUNTER
Last Fill 5/11/20  Last Office Visit 5/11/20   Return in about 3 months (around 8/11/2020).    Pending Appointments 8/20/20

## 2020-08-06 RX ORDER — ALPRAZOLAM 2 MG/1
2 TABLET ORAL 2 TIMES DAILY PRN
Qty: 45 TABLET | Refills: 2 | OUTPATIENT
Start: 2020-08-06 | End: 2020-11-04

## 2020-08-06 RX ORDER — CLONAZEPAM 1 MG/1
2 TABLET ORAL NIGHTLY PRN
Qty: 60 TABLET | Refills: 2 | OUTPATIENT
Start: 2020-08-06 | End: 2020-11-04

## 2020-08-10 ENCOUNTER — VIRTUAL VISIT (OUTPATIENT)
Dept: FAMILY MEDICINE CLINIC | Age: 50
End: 2020-08-10
Payer: COMMERCIAL

## 2020-08-10 ENCOUNTER — PATIENT MESSAGE (OUTPATIENT)
Dept: FAMILY MEDICINE CLINIC | Age: 50
End: 2020-08-10

## 2020-08-10 PROCEDURE — G8417 CALC BMI ABV UP PARAM F/U: HCPCS | Performed by: NURSE PRACTITIONER

## 2020-08-10 PROCEDURE — G8427 DOCREV CUR MEDS BY ELIG CLIN: HCPCS | Performed by: NURSE PRACTITIONER

## 2020-08-10 PROCEDURE — 1036F TOBACCO NON-USER: CPT | Performed by: NURSE PRACTITIONER

## 2020-08-10 PROCEDURE — 99214 OFFICE O/P EST MOD 30 MIN: CPT | Performed by: NURSE PRACTITIONER

## 2020-08-10 PROCEDURE — 3017F COLORECTAL CA SCREEN DOC REV: CPT | Performed by: NURSE PRACTITIONER

## 2020-08-10 RX ORDER — ALPRAZOLAM 1 MG/1
1 TABLET ORAL NIGHTLY PRN
Qty: 30 TABLET | Refills: 0 | Status: SHIPPED | OUTPATIENT
Start: 2020-08-10 | End: 2020-08-11

## 2020-08-10 RX ORDER — CLONAZEPAM 1 MG/1
2 TABLET ORAL NIGHTLY PRN
Qty: 60 TABLET | Refills: 2 | Status: SHIPPED | OUTPATIENT
Start: 2020-08-10 | End: 2020-10-26 | Stop reason: SDUPTHER

## 2020-08-10 ASSESSMENT — ENCOUNTER SYMPTOMS
ABDOMINAL PAIN: 1
SHORTNESS OF BREATH: 0

## 2020-08-10 NOTE — PROGRESS NOTES
8/10/2020    TELEHEALTH EVALUATION -- Audio/Visual (During XYIMZ-04 public health emergency)    HPI:    Aiden Medeiros (:  1970) has requested an audio/video evaluation for the following concern(s):    Circadian rhythm is off  Takes klonipin at night for sleep  Wearing his watch to bed- low heart rate when going to sleep 40s; during the day 50-60s- emailed Dr. Chon Mcfarland- has an appointment on the ; does feel fatigued, not lightheaded or dizzy  Off beta blockers   Has not increased pain medication  Sees Dr. Sukhi Cherry- sleep doctor  Dr. Landy Baez gives medication based on his recommendations- per patient    Started running to get exercise, be more tired; HR 210s when exercising  BP is lower- 110-120s  The past 3 weeks- still has gotten low HR notifications- not nearly as many    Endocrinology- Dr. He Cherry; half of eye brow hair is falling out- worried about hypothyroidism, has labs to be drawn    Anxiety  X many years  Started on xanax d/t not being able to sleep  Takes xanax during the day    Chronic pain  Hx of pancreatitis  Gets pain medication- Dr. Patsy Parker    Review of Systems   Constitutional: Positive for fatigue. Negative for activity change, appetite change and fever. Respiratory: Negative for shortness of breath. Cardiovascular: Negative for chest pain and leg swelling. Bradycardia   Gastrointestinal: Positive for abdominal pain. Endocrine:        Eyebrow loss   Neurological: Negative for dizziness and headaches. Psychiatric/Behavioral: Positive for sleep disturbance. The patient is nervous/anxious. Prior to Visit Medications    Medication Sig Taking? Authorizing Provider   clonazePAM (KLONOPIN) 1 MG tablet Take 2 tablets by mouth nightly as needed (sleep) for up to 90 days.  Yes RENO Nguyen - CNP   lisinopril (PRINIVIL;ZESTRIL) 20 MG tablet TAKE ONE TABLET BY MOUTH DAILY Yes Liborio Hussein MD   fluticasone (FLONASE) 50 MCG/ACT nasal spray SPRAY TWO SPRAYS INTO EACH NOSTRIL DAILY Yes Shiloh Cavanaugh MD   Biotin 13261 MCG TABS Take 1 tablet by mouth daily Yes Historical Provider, MD   azelastine (ASTELIN) 0.1 % nasal spray 2 sprays by Nasal route 2 times daily Use in each nostril as directed Yes Shiloh Cavanaugh MD   ketoconazole (NIZORAL) 2 % shampoo Wash as needed Yes Sylvain [place] MD   metroNIDAZOLE (METROCREAM) 0.75 % cream APPLY TO AFFECTED AREA(S) OF THE FACE TWO TIMES A DAY Yes Sylvain [place] MD   Ergocalciferol (VITAMIN D2 PO) Take 50,000 Units by mouth daily Yes Historical Provider, MD   dexamethasone 0.5 MG/5ML elixir Take by mouth daily Yes Historical Provider, MD   metroNIDAZOLE (METROCREAM) 0.75 % cream Apply topically 2 times daily. Yes Sylvain [place] MD   Probiotic Product (VSL#3) CAPS daily Yes Shiloh Cavanaugh MD   Multiple Vitamin (DAILY-SHANNON) TABS TAKE ONE TABLET BY MOUTH DAILY Yes RENO Peres CNP   omega-3 acid ethyl esters (LOVAZA) 1 g capsule Take 2 capsules by mouth 2 times daily Yes RENO Peres CNP   Testosterone (AXIRON) 30 MG/ACT SOLN Place 90 mg onto the skin daily Yes RENO Peres CNP   5-Hydroxytryptophan (5-HTP) 50 MG CAPS Take 1 capsule by mouth every morning (before breakfast) Yes Venkatesh Bell MD   Melatonin 2.5 MG CAPS Take 5 mg by mouth  Yes Historical Provider, MD   Naloxegol Oxalate (MOVANTIK PO) Take by mouth Yes Historical Provider, MD   oxymorphone (OPANA ER) 40 MG ER tablet Take 40 mg by mouth 4 times daily  Yes Historical Provider, MD   polyethylene glycol (GLYCOLAX) packet Take 17 g by mouth daily as needed.  Yes Historical Provider, MD   Amylase-Lipase-Protease (CREON 20 PO) Take 20,000 mg by mouth three times daily  Yes Historical Provider, MD   omeprazole (PRILOSEC) 20 MG capsule Take 20 mg by mouth Daily  Yes Historical Provider, MD   oxycodone (OXY-IR) 30 MG immediate release tablet Take 30 mg by mouth 4 times daily  Yes Historical Provider, MD   ALPRAZolam Thachang Burg[place]) 2 MG tablet Take 1 tablet by mouth 2 times daily as needed for Anxiety for up to 90 days. Osmin Fox, RENO - CNP       Social History     Tobacco Use    Smoking status: Former Smoker     Years: 2.00     Types: Cigars    Smokeless tobacco: Never Used   Substance Use Topics    Alcohol use: No    Drug use: No            PHYSICAL EXAMINATION:  [ INSTRUCTIONS:  \"[x]\" Indicates a positive item  \"[]\" Indicates a negative item  -- DELETE ALL ITEMS NOT EXAMINED]  Vital Signs: (As obtained by patient/caregiver or practitioner observation)    Blood pressure-  Heart rate-    Respiratory rate-    Temperature-  Pulse oximetry-     Constitutional: [x] Appears well-developed and well-nourished [x] No apparent distress      [] Abnormal-   Mental status  [x] Alert and awake  [x] Oriented to person/place/time [x]Able to follow commands      Eyes:  EOM    []  Normal  [] Abnormal-  Sclera  []  Normal  [] Abnormal -         Discharge []  None visible  [] Abnormal -    HENT:   [x] Normocephalic, atraumatic. [] Abnormal   [] Mouth/Throat: Mucous membranes are moist.     External Ears [] Normal  [] Abnormal-     Neck: [] No visualized mass     Pulmonary/Chest: [x] Respiratory effort normal.  [x] No visualized signs of difficulty breathing or respiratory distress        [] Abnormal-      Musculoskeletal:   [] Normal gait with no signs of ataxia         [] Normal range of motion of neck        [] Abnormal-       Neurological:        [x] No Facial Asymmetry (Cranial nerve 7 motor function) (limited exam to video visit)          [] No gaze palsy        [] Abnormal-         Skin:        [x] No significant exanthematous lesions or discoloration noted on facial skin         [] Abnormal-            Psychiatric:       [x] Normal Affect [] No Hallucinations        [] Abnormal-     Other pertinent observable physical exam findings-     ASSESSMENT/PLAN:  1. Primary insomnia  Stable; Continue current regimen.   Continue to f/u with specialist.  - clonazePAM (KLONOPIN) 1 MG tablet; Take 2 tablets by mouth nightly as needed (sleep) for up to 90 days. Dispense: 60 tablet; Refill: 2    2. Anxiety  Stable;  Continue current regimen. 3. Chronic pancreatitis, unspecified pancreatitis type ([address]  Stable;  Continue current regimen as prescribed by pain management. 4. Sparse eyebrow  Stable;  Encouraged patient to have blood work completed with Dr. Yolanda Silverman. 5. Bradycardia  Stable;  Discussed could be related to thyroid. Encouraged patient to have blood work completed. Has an upcoming appointment with cardiology and message sent. Return in about 3 months (around 11/10/2020). Monisha Bowie is a 44 y.o. male being evaluated by a Virtual Visit (video visit) encounter to address concerns as mentioned above. A caregiver was present when appropriate. Due to this being a TeleHealth encounter ([facility]), evaluation of the following organ systems was limited: Vitals/Constitutional/EENT/Resp/CV/GI//MS/Neuro/Skin/Heme-Lymph-Imm. Pursuant to the emergency declaration under the [address] [place] [address], [address] [place] [address] authority and the [employer] and [employer] Act, this Virtual Visit was conducted with patient's (and/or legal guardian's) consent, to reduce the patient's risk of exposure to COVID-19 and provide necessary medical care. The patient (and/or legal guardian) has also been advised to contact this office for worsening conditions or problems, and seek emergency medical treatment and/or call 911 if deemed necessary. Patient identification was verified at the start of the visit: Yes    Total time spent on this encounter: Not billed by time    Services were provided through a video synchronous discussion virtually to substitute for in-person clinic visit. Patient and provider were located at their individual homes.     --Macarthur Leyden, RENO - CNP

## 2020-08-11 RX ORDER — ALPRAZOLAM 2 MG/1
2 TABLET ORAL 2 TIMES DAILY PRN
Qty: 45 TABLET | Refills: 2 | Status: SHIPPED | OUTPATIENT
Start: 2020-08-11 | End: 2020-10-26 | Stop reason: SDUPTHER

## 2020-08-11 NOTE — TELEPHONE ENCOUNTER
Patient states that medication should be able to be filled tomorrow. Removed the don't fill until Friday from rx.

## 2020-08-11 NOTE — TELEPHONE ENCOUNTER
From: Chloe Cason  To: Rei Cabrera MD  Sent: 8/10/2020 6:08 PM EDT  Subject: Visit Follow-Up Question    Hi. We did all that to help stay on my med sync date but we have an issue with one med. currently At the pharmacy and my alporazapam prescription isn't correct. Mrs. Wood specifically said she wasn't making any changes to my prescriptions. The Clonazapam is correct but the alporazapam is the wrong dose and amount. The order she sent is for 1mg 1x daily. I take 2mg up to twice daily prn (45 count). Can you please have doc review this and send the correct one over? Feel free to call me if there is an issue. Thank you.      Shabana Montes

## 2020-08-12 RX ORDER — ALPRAZOLAM 2 MG/1
2 TABLET ORAL 2 TIMES DAILY PRN
Qty: 45 TABLET | Refills: 2 | OUTPATIENT
Start: 2020-08-12 | End: 2020-11-10

## 2020-08-19 ENCOUNTER — OFFICE VISIT (OUTPATIENT)
Dept: CARDIOLOGY CLINIC | Age: 50
End: 2020-08-19
Payer: COMMERCIAL

## 2020-08-19 VITALS
BODY MASS INDEX: 33.77 KG/M2 | HEART RATE: 86 BPM | HEIGHT: 69 IN | TEMPERATURE: 97.3 F | SYSTOLIC BLOOD PRESSURE: 132 MMHG | WEIGHT: 228 LBS | OXYGEN SATURATION: 98 % | DIASTOLIC BLOOD PRESSURE: 84 MMHG

## 2020-08-19 PROCEDURE — 99214 OFFICE O/P EST MOD 30 MIN: CPT | Performed by: INTERNAL MEDICINE

## 2020-08-19 PROCEDURE — G8417 CALC BMI ABV UP PARAM F/U: HCPCS | Performed by: INTERNAL MEDICINE

## 2020-08-19 PROCEDURE — G8427 DOCREV CUR MEDS BY ELIG CLIN: HCPCS | Performed by: INTERNAL MEDICINE

## 2020-08-19 PROCEDURE — 3017F COLORECTAL CA SCREEN DOC REV: CPT | Performed by: INTERNAL MEDICINE

## 2020-08-19 PROCEDURE — 1036F TOBACCO NON-USER: CPT | Performed by: INTERNAL MEDICINE

## 2020-08-19 NOTE — PROGRESS NOTES
in his left arm/finger tips. According to the records that he brought from [facility], CHF and TIA were noted on his discharge. He recalls being told that his stress test and CXR were normal. He was seen by a cardiologist who reportedly told him that his heart was okay. He has been checking his blood pressure at home and states it has been as high as 140s. Patient currently denies any weight gain, edema, palpitations, shortness of breath, dizziness, and syncope. Review of Systems:  12 point ROS negative in all areas as listed below except as in [place]  Constitutional, EENT, Cardiovascular, pulmonary, GI, , Musculoskeletal, skin, neurological, hematological, endocrine, Psychiatric      Reviewed past medical history, social, and family history. Non smoker quit 20 years ago, no alcohol, no illicit drugs  Past Medical History:   Diagnosis Date    Acromegalia ([address]     Anxiety     Chronic pain syndrome     Chronic pancreatitis (HCC)     Dyslipidemia     Elevated insulin-like growth factor 1 (IGF-1) level     Hypogonadism male     IFG (impaired fasting glucose)     Insomnia     Low serum cortisol level (HCC)     Neuropathic pain     Pancreatitis     Pituitary adenoma (HCC)     Secondary adrenal insufficiency (HCC)      Past Surgical History:   Procedure Laterality Date    CHOLECYSTECTOMY      ERCP         Objective:   /84   Pulse 86   Temp 97.3 °F (36.3 °C)   Ht 5' 9\" (1.753 m)   Wt 228 lb (103.4 kg)   SpO2 98%   BMI 33.67 kg/m²     Wt Readings from Last 3 Encounters:   08/19/20 228 lb (103.4 kg)   02/19/20 238 lb 9.6 oz (108.2 kg)   02/11/20 235 lb (106.6 kg)       Physical Exam:  General: No Respiratory distress, appears well developed and well nourished.    Eyes:  Sclera nonicteric periorbital edema  Nose/Sinuses:  negative findings: nose shows no deformity, asymmetry, or inflammation, nasal mucosa normal, septum midline with no perforation or bleeding  Back:  no pain to palpation  Joint: no active joint inflammation  Musculoskeletal:  negative  Skin:  Warm and dry seaborric dermatitis on neck and face face skin red  Neck:  Negative for JVD and Carotid Bruits. Chest:  Clear to auscultation, respiration easy  Cardiovascular:  RRR, S1S2 normal, no murmur, no rub or thrill. Extremities:   No edema, clubbing, cyanosis,  Pulses: pedal pulses are normal.  Neuro: intact    Medications:   Outpatient Encounter Medications as of 8/19/2020   Medication Sig Dispense Refill    ALPRAZolam (XANAX) 2 MG tablet Take 1 tablet by mouth 2 times daily as needed for Anxiety for up to 90 days. 45 tablet 2    clonazePAM (KLONOPIN) 1 MG tablet Take 2 tablets by mouth nightly as needed (sleep) for up to 90 days. 60 tablet 2    lisinopril (PRINIVIL;ZESTRIL) 20 MG tablet TAKE ONE TABLET BY MOUTH DAILY 90 tablet 2    fluticasone (FLONASE) 50 MCG/ACT nasal spray SPRAY TWO SPRAYS INTO EACH NOSTRIL DAILY 1 Bottle 1    Biotin 88412 MCG TABS Take 1 tablet by mouth daily      azelastine (ASTELIN) 0.1 % nasal spray 2 sprays by Nasal route 2 times daily Use in each nostril as directed 2 Bottle 5    ketoconazole (NIZORAL) 2 % shampoo Wash as needed 120 mL 11    metroNIDAZOLE (METROCREAM) 0.75 % cream APPLY TO AFFECTED AREA(S) OF THE FACE TWO TIMES A DAY 45 g 11    Ergocalciferol (VITAMIN D2 PO) Take 50,000 Units by mouth daily      metroNIDAZOLE (METROCREAM) 0.75 % cream Apply topically 2 times daily.  60 g 11    Probiotic Product (VSL#3) CAPS daily 30 capsule 0    Multiple Vitamin (DAILY-SHANNON) TABS TAKE ONE TABLET BY MOUTH DAILY 30 tablet 1    omega-3 acid ethyl esters (LOVAZA) 1 g capsule Take 2 capsules by mouth 2 times daily 360 capsule 1    Testosterone (AXIRON) 30 MG/ACT SOLN Place 90 mg onto the skin daily 2 Bottle 3    Melatonin 2.5 MG CAPS Take 5 mg by mouth       Naloxegol Oxalate (MOVANTIK PO) Take by mouth      oxymorphone (OPANA ER) 40 MG ER tablet Take 40 mg by mouth 4 times daily       polyethylene glycol (GLYCOLAX) packet Take 17 g by mouth daily as needed.  Amylase-Lipase-Protease (CREON 20 PO) Take 20,000 mg by mouth three times daily       omeprazole (PRILOSEC) 20 MG capsule Take 20 mg by mouth Daily       oxycodone (OXY-IR) 30 MG immediate release tablet Take 30 mg by mouth 4 times daily       dexamethasone 0.5 MG/5ML elixir Take by mouth daily      5-Hydroxytryptophan (5-HTP) 50 MG CAPS Take 1 capsule by mouth every morning (before breakfast) (Patient not taking: Reported on 8/19/2020) 90 capsule 2     No facility-administered encounter medications on file as of 8/19/2020. Lab Data:  CBC: No results for input(s): WBC, HGB, HCT, MCV, PLT in the last 72 hours. BMP: No results for input(s): NA, K, CL, CO2, PHOS, BUN, CREATININE in the last 72 hours. Invalid input(s): CA  LIVER PROFILE: No results for input(s): AST, ALT, LIPASE, BILIDIR, BILITOT, ALKPHOS in the last 72 hours. Invalid input(s): AMYLASE,  ALB  LIPID:   Lab Results   Component Value Date    CHOL 177 08/21/2018    CHOL 169 05/23/2016    CHOL 165 01/14/2016     Lab Results   Component Value Date    TRIG 171 08/21/2018    TRIG 146 05/23/2016    TRIG 160 (H) 01/14/2016     Lab Results   Component Value Date    HDL 24 (A) 08/21/2018    HDL 27 (L) 07/21/2017    HDL 30 (L) 05/23/2016     Lab Results   Component Value Date    LDLCALC 119 08/21/2018    LDLCALC 130 (H) 07/21/2017    LDLCALC 110 (H) 05/23/2016    LDLCHOLESTEROL 128 10/24/2014     Lab Results   Component Value Date    LABVLDL 27 07/21/2017    LABVLDL 29 05/23/2016    LABVLDL 32 01/14/2016    VLDL 34 08/21/2018     Lab Results   Component Value Date    CHOLHDLRATIO 7.4 08/21/2018    CHOLHDLRATIO 5.7 (H) 05/25/2010     PT/INR: No results for input(s): PROTIME, INR in the last 72 hours. A1C:   Lab Results   Component Value Date    LABA1C 5.4 05/23/2016     BNP:  No results for input(s): BNP in the last 72 hours.     IMAGING:  Graded exercise stress test 2/5/20 Rick Shoulder [facility] [address])         CXR 2/4/20      US AAA 10/30/19   No evidence of abdominal aortic aneurysm. EKG 8/8/18  NSR normal  ECHO 7/26/18  Summary   Normal LV systolic function with an estimated EF of 55%.   There is mild concentric left ventricular hypertrophy.   No regional wall motion abnormalities are seen.   Normal left ventricular diastolic filling pressure.   Mild mitral, aortic, and tricuspid regurgitation.     Carotid US 5/29/18  1. There is no gross plaque or stenosis in the internal carotid arteries  bilaterally. 2. The vertebral arteries are patent with antegrade flow bilaterally    Abdominal US 7/21/17  FINDINGS: Aorta:   Abdominal aorta is normal in caliber and not significantly changed from the prior study. Proximal aorta measures 1.6 x 1.3 cm. Mid aorta measures 1.6 x 1.3 cm. Distal aorta measure 1.2 x 1.4 cm. Vascular and Doppler interrogation is unremarkable. Iliacs:   Iliac arteries are patent and normal in caliber. ECHO 6/23/17  Normal left ventricular systolic function with an estimated ejection   fraction of 55%.   Normal left ventricular diastolic filling pressure.   The right ventricle is mildly enlarged.   The right atrium is mildly dilated.   Mild mitral regurgitation.   Mild aortic regurgitation.   Systolic pulmonary artery pressure (SPAP) is normal and estimated at 28 mmHg   (RA pressure 3 mmHg). ECHO 4/9/15  Summary  Normal left ventricle size and systolic function with an estimated   Ejection fraction of 55%. No regional wall motion abnormalities are seen. Mild  concentric LVH. Grade I diastolic function ( IVRT 467) with impaired relaxation. Mitral valve is structurally normal.  Mitral valve leaflets appear to open adequately. Mild mitral regurgitation is present. The aortic valve is normal in structure and function. There is no  significant aortic regurgitation. The aortic root is at the upper limit of normal in size.   The right atrium is slightly personally performed the services described in this documentation, as scribed by the above signed scribe in my presence. It is both accurate and complete to my knowledge. I agree with the details independently gathered by the clinical support staff, while the remaining scribed note accurately describes my personal service to the patient.           [address] [place], MD 8/19/2020 4:21 PM

## 2020-09-02 NOTE — TELEPHONE ENCOUNTER
LOV 8/10/2020  Return in about 3 months (around 11/10/2020).   No pending appointments  Last fill 6/15/2020

## 2020-09-03 RX ORDER — FLUTICASONE PROPIONATE 50 MCG
SPRAY, SUSPENSION (ML) NASAL
Qty: 1 BOTTLE | Refills: 0 | Status: SHIPPED | OUTPATIENT
Start: 2020-09-03 | End: 2020-10-05

## 2020-10-05 RX ORDER — FLUTICASONE PROPIONATE 50 MCG
SPRAY, SUSPENSION (ML) NASAL
Qty: 1 BOTTLE | Refills: 0 | Status: SHIPPED | OUTPATIENT
Start: 2020-10-05 | End: 2020-11-02

## 2020-10-05 NOTE — TELEPHONE ENCOUNTER
Last Fill 9/3/20  Last Office Visit 8/10/20   Return in about 3 months (around 11/10/2020).    No Pending Appointments

## 2020-10-15 ENCOUNTER — TELEPHONE (OUTPATIENT)
Dept: FAMILY MEDICINE CLINIC | Age: 50
End: 2020-10-15

## 2020-10-15 NOTE — TELEPHONE ENCOUNTER
Results received, wife informed that it was not detected. Scheduled virtual visit for tomorrow afternoon as patient is having continuous fever.

## 2020-10-15 NOTE — TELEPHONE ENCOUNTER
Please contact pt's wife Jackie with his COVID-19 results. She states the results were faxed over today around 10:45.

## 2020-10-16 ENCOUNTER — VIRTUAL VISIT (OUTPATIENT)
Dept: FAMILY MEDICINE CLINIC | Age: 50
End: 2020-10-16
Payer: COMMERCIAL

## 2020-10-16 PROCEDURE — G8427 DOCREV CUR MEDS BY ELIG CLIN: HCPCS | Performed by: FAMILY MEDICINE

## 2020-10-16 PROCEDURE — G8484 FLU IMMUNIZE NO ADMIN: HCPCS | Performed by: FAMILY MEDICINE

## 2020-10-16 PROCEDURE — 3017F COLORECTAL CA SCREEN DOC REV: CPT | Performed by: FAMILY MEDICINE

## 2020-10-16 PROCEDURE — G8417 CALC BMI ABV UP PARAM F/U: HCPCS | Performed by: FAMILY MEDICINE

## 2020-10-16 PROCEDURE — 1036F TOBACCO NON-USER: CPT | Performed by: FAMILY MEDICINE

## 2020-10-16 PROCEDURE — 99213 OFFICE O/P EST LOW 20 MIN: CPT | Performed by: FAMILY MEDICINE

## 2020-10-16 NOTE — PROGRESS NOTES
10/16/2020    TELEHEALTH EVALUATION -- Audio/Visual (During BTIQU-38 public health emergency)    HPI:    Trever Lazo (:  1970) has requested an audio/video evaluation for the following concern(s):    Chief Complaint   Patient presents with    Check-Up     high fever up to 103, shivering,       Sick for the last 10 days. Stomach aches. covid negative. Past Medical History:Reviewed  Medications:Reviewed. Allergies   Allergen Reactions    Ambien [Zolpidem] Other (See Comments)     Blackout lists    Augmentin [Amoxicillin-Pot Clavulanate] Other (See Comments)     diarrhea    Elavil [Amitriptyline] Nausea Only and Other (See Comments)     JITTERY FEELING    Neurontin [Gabapentin] Nausea Only    Topamax Nausea Only    Tramadol Other (See Comments)     Patient has been on high dose pain medincines since  so tramadol doesn't help with pain at all      Social hx:Reviewed. Social History     Tobacco Use   Smoking Status Former Smoker    Years: 2.00    Types: Cigars   Smokeless Tobacco Never Used        Review of Systems   Constitutional: Negative. Respiratory: Negative. Cardiovascular: Negative. Prior to Visit Medications    Medication Sig Taking? Authorizing Provider   fluticasone (FLONASE) 50 MCG/ACT nasal spray SPRAY 2 SPRAYS IN EACH NOSTRIL DAILY Yes Linda Lyn MD   ALPRAZolam [facility]) 2 MG tablet Take 1 tablet by mouth 2 times daily as needed for Anxiety for up to 90 days. Yes RENO Gee CNP   clonazePAM (KLONOPIN) 1 MG tablet Take 2 tablets by mouth nightly as needed (sleep) for up to 90 days.  Yes RENO Gee CNP   lisinopril (PRINIVIL;ZESTRIL) 20 MG tablet TAKE ONE TABLET BY MOUTH DAILY Yes Jelena Cox MD   Biotin 09128 MCG TABS Take 1 tablet by mouth daily Yes Historical Provider, MD   azelastine (ASTELIN) 0.1 % nasal spray 2 sprays by Nasal route 2 times daily Use in each nostril as directed Yes Linda Lyn MD [Zolpidem] Other (See Comments)     Blackout lists    Augmentin [Amoxicillin-Pot Clavulanate] Other (See Comments)     diarrhea    Elavil [Amitriptyline] Nausea Only and Other (See Comments)     JITTERY FEELING    Neurontin [Gabapentin] Nausea Only    Topamax Nausea Only    Tramadol Other (See Comments)     Patient has been on high dose pain medincines since 2002 so tramadol doesn't help with pain at all       PHYSICAL EXAMINATION:  [ INSTRUCTIONS:  \"[x]\" Indicates a positive item  \"[]\" Indicates a negative item  -- DELETE ALL ITEMS NOT EXAMINED]  Vital Signs: (As obtained by patient/caregiver or practitioner observation)    Blood pressure-  Heart rate-    Respiratory rate-    Temperature-  Pulse oximetry-     Constitutional: [x] Appears well-developed and well-nourished [x] No apparent distress      [] Abnormal-   Mental status  [] Alert and awake  [] Oriented to person/place/time []Able to follow commands      Eyes:  EOM    []  Normal  [] Abnormal-  Sclera  []  Normal  [] Abnormal -         Discharge []  None visible  [] Abnormal -    HENT:   [] Normocephalic, atraumatic.   [] Abnormal   [] Mouth/Throat: Mucous membranes are moist.     External Ears [] Normal  [] Abnormal-     Neck: [] No visualized mass     Pulmonary/Chest: [] Respiratory effort normal.  [] No visualized signs of difficulty breathing or respiratory distress        [] Abnormal-      Musculoskeletal:   [] Normal gait with no signs of ataxia         [] Normal range of motion of neck        [] Abnormal-       Neurological:        [x] No Facial Asymmetry (Cranial nerve 7 motor function) (limited exam to video visit)          [] No gaze palsy        [] Abnormal-         Skin:        [] No significant exanthematous lesions or discoloration noted on facial skin         [] Abnormal-            Psychiatric:       [x] Normal Affect [] No Hallucinations        [] Abnormal-     Other pertinent observable physical exam findings-     ASSESSMENT/PLAN: Diagnosis Orders   1. Shortness of breath  XR CHEST (2 VW)   2. Fever, unspecified fever cause  XR CHEST (2 VW)      chest X-ray to evaluate. covid was negative. Criteria for emergent care reviewed. No follow-ups on file. Jacqueline Vergara is a 44 y.o. male being evaluated by a Virtual Visit (video visit) encounter to address concerns as mentioned above. A caregiver was present when appropriate. Due to this being a TeleHealth encounter (During [facility]-94 public health emergency), evaluation of the following organ systems was limited: Vitals/Constitutional/EENT/Resp/CV/GI//MS/Neuro/Skin/Heme-Lymph-Imm. Pursuant to the emergency declaration under the [address] [place] [address], [address] [place] [address] authority and the [employer] Act, this Virtual Visit was conducted with patient's (and/or legal guardian's) consent, to reduce the patient's risk of exposure to COVID-19 and provide necessary medical care. The patient (and/or legal guardian) has also been advised to contact this office for worsening conditions or problems, and seek emergency medical treatment and/or call 911 if deemed necessary. Patient identification was verified at the start of the visit: Yes    Total time spent on this encounter: Not billed by time    Services were provided through a video synchronous discussion virtually to substitute for in-person clinic visit. Patient and provider were located at their individual homes. --Bharti Doshi MD on 10/16/2020 at 3:40 PM    An electronic signature was used to authenticate this note.

## 2020-10-18 ASSESSMENT — ENCOUNTER SYMPTOMS: RESPIRATORY NEGATIVE: 1

## 2020-10-26 ENCOUNTER — VIRTUAL VISIT (OUTPATIENT)
Dept: FAMILY MEDICINE CLINIC | Age: 50
End: 2020-10-26
Payer: COMMERCIAL

## 2020-10-26 PROCEDURE — 99213 OFFICE O/P EST LOW 20 MIN: CPT | Performed by: FAMILY MEDICINE

## 2020-10-26 PROCEDURE — G8484 FLU IMMUNIZE NO ADMIN: HCPCS | Performed by: FAMILY MEDICINE

## 2020-10-26 PROCEDURE — G8417 CALC BMI ABV UP PARAM F/U: HCPCS | Performed by: FAMILY MEDICINE

## 2020-10-26 PROCEDURE — 1036F TOBACCO NON-USER: CPT | Performed by: FAMILY MEDICINE

## 2020-10-26 PROCEDURE — 71046 X-RAY EXAM CHEST 2 VIEWS: CPT | Performed by: FAMILY MEDICINE

## 2020-10-26 PROCEDURE — G8427 DOCREV CUR MEDS BY ELIG CLIN: HCPCS | Performed by: FAMILY MEDICINE

## 2020-10-26 PROCEDURE — 3017F COLORECTAL CA SCREEN DOC REV: CPT | Performed by: FAMILY MEDICINE

## 2020-10-26 RX ORDER — ALPRAZOLAM 2 MG/1
2 TABLET ORAL 2 TIMES DAILY PRN
Qty: 45 TABLET | Refills: 2 | Status: SHIPPED | OUTPATIENT
Start: 2020-10-26 | End: 2021-01-20 | Stop reason: SDUPTHER

## 2020-10-26 RX ORDER — CLONAZEPAM 1 MG/1
2 TABLET ORAL NIGHTLY PRN
Qty: 60 TABLET | Refills: 2 | Status: SHIPPED | OUTPATIENT
Start: 2020-10-26 | End: 2021-01-20 | Stop reason: SDUPTHER

## 2020-10-26 NOTE — PROGRESS NOTES
10/26/2020    TELEHEALTH EVALUATION -- Audio/Visual (During XWYFN-50 public health emergency)    HPI:    Ernie Cruz (:  1970) has requested an audio/video evaluation for the following concern(s):    Chief Complaint   Patient presents with    3 Month Follow-Up    Follow-up     COVID negative, sick since 8/3 with fevers off/on. Still feeling ill. Started with GI symptoms. Then fever and cough. Taste affected  Still has significant fatigue. Xray was done last week. Xanax taken as prescribed for anxiety during the day  Klonopin for sleep. Has been stable on current doses for years. Pt aware of potential respiratory depression with opiates. Review of Systems   Constitutional: Negative. Prior to Visit Medications    Medication Sig Taking? Authorizing Provider   fluticasone (FLONASE) 50 MCG/ACT nasal spray SPRAY 2 SPRAYS IN EACH NOSTRIL DAILY Yes Marquez Ramirez MD   ALPRAZolam Tredorita Porter) 2 MG tablet Take 1 tablet by mouth 2 times daily as needed for Anxiety for up to 90 days. Yes RENO Briceño CNP   clonazePAM (KLONOPIN) 1 MG tablet Take 2 tablets by mouth nightly as needed (sleep) for up to 90 days.  Yes RENO Briceño CNP   lisinopril (PRINIVIL;ZESTRIL) 20 MG tablet TAKE ONE TABLET BY MOUTH DAILY Yes Bridgette Ceballos MD   Biotin 84704 MCG TABS Take 1 tablet by mouth daily Yes Historical Provider, MD   azelastine (ASTELIN) 0.1 % nasal spray 2 sprays by Nasal route 2 times daily Use in each nostril as directed Yes Marquez Ramirez MD   ketoconazole (NIZORAL) 2 % shampoo Wash as needed Yes Jacek Soto MD   metroNIDAZOLE (METROCREAM) 0.75 % cream APPLY TO AFFECTED AREA(S) OF THE FACE TWO TIMES A DAY Yes Jacek Soto MD   Ergocalciferol (VITAMIN D2 PO) Take 50,000 Units by mouth daily Yes Historical Provider, MD   dexamethasone 0.5 MG/5ML elixir Take by mouth daily Yes Historical Provider, MD   metroNIDAZOLE (METROCREAM) 0.75 % cream Apply topically 2 times daily. Yes Jona Alcazar MD   Probiotic Product (VSL#3) CAPS daily Yes Elza Marin MD   Multiple Vitamin (DAILY-SHANNON) TABS TAKE ONE TABLET BY MOUTH DAILY Yes RENO Asif CNP   omega-3 acid ethyl esters (LOVAZA) 1 g capsule Take 2 capsules by mouth 2 times daily Yes RENO Asif CNP   Testosterone (AXIRON) 30 MG/ACT SOLN Place 90 mg onto the skin daily Yes RENO Asif CNP   5-Hydroxytryptophan (5-HTP) 50 MG CAPS Take 1 capsule by mouth every morning (before breakfast) Yes Norma Martinez MD   Melatonin 2.5 MG CAPS Take 5 mg by mouth  Yes Historical Provider, MD   Naloxegol Oxalate (MOVANTIK PO) Take by mouth Yes Historical Provider, MD   oxymorphone (OPANA ER) 40 MG ER tablet Take 40 mg by mouth 4 times daily  Yes Historical Provider, MD   polyethylene glycol (GLYCOLAX) packet Take 17 g by mouth daily as needed.  Yes Historical Provider, MD   Amylase-Lipase-Protease (CREON 20 PO) Take 20,000 mg by mouth three times daily  Yes Historical Provider, MD   omeprazole (PRILOSEC) 20 MG capsule Take 20 mg by mouth Daily  Yes Historical Provider, MD   oxycodone (OXY-IR) 30 MG immediate release tablet Take 30 mg by mouth 4 times daily  Yes Historical Provider, MD       Social History     Tobacco Use    Smoking status: Former Smoker     Years: 2.00     Types: Cigars    Smokeless tobacco: Never Used   Substance Use Topics    Alcohol use: No    Drug use: No        Allergies   Allergen Reactions    Ambien [Zolpidem] Other (See Comments)     Blackout lists    Augmentin [Amoxicillin-Pot Clavulanate] Other (See Comments)     diarrhea    Elavil [Amitriptyline] Nausea Only and Other (See Comments)     JITTERY FEELING    Neurontin [Gabapentin] Nausea Only    Topamax Nausea Only    Tramadol Other (See Comments)     Patient has been on high dose pain medincines since 2002 so tramadol doesn't help with pain at all       PHYSICAL EXAMINATION:  [ INSTRUCTIONS:  \"[x]\" klonopin  - clonazePAM (KLONOPIN) 1 MG tablet; Take 2 tablets by mouth nightly as needed (sleep) for up to 90 days. Dispense: 60 tablet; Refill: 2    Suspect recent covid despite negative test. Improving and has met criteria to stop isolation    No follow-ups on file. Ghada Egan is a 44 y.o. male being evaluated by a Virtual Visit (video visit) encounter to address concerns as mentioned above. A caregiver was present when appropriate. Due to this being a TeleHealth encounter (During ZOYSG-01 public health emergency), evaluation of the following organ systems was limited: Vitals/Constitutional/EENT/Resp/CV/GI//MS/Neuro/Skin/Heme-Lymph-Imm. Pursuant to the emergency declaration under the [address] [place] [address], [address] [place] [address] authority and the [employer] and [employer] Act, this Virtual Visit was conducted with patient's (and/or legal guardian's) consent, to reduce the patient's risk of exposure to COVID-19 and provide necessary medical care. The patient (and/or legal guardian) has also been advised to contact this office for worsening conditions or problems, and seek emergency medical treatment and/or call 911 if deemed necessary. Patient identification was verified at the start of the visit: Yes    Total time spent on this encounter: Not billed by time    Services were provided through a video synchronous discussion virtually to substitute for in-person clinic visit. Patient and provider were located at their individual homes. --Bertha Calixto MD on 10/26/2020 at 1:49 PM    An electronic signature was used to authenticate this note.

## 2020-10-26 NOTE — TELEPHONE ENCOUNTER
Rosi Lehman received a prescription for patient for ALPRAZolam Ale Gaona 2 MG tablets, which has in the notes to please fill on Friday. They usually fill 2 days early so they would like a verbal to fill this. Pharmacy says we may want to take the note off to \"Notes:  Please fill on Friday. \"    Please contact pharmacy to advise.

## 2020-10-29 RX ORDER — AZITHROMYCIN 250 MG/1
250 TABLET, FILM COATED ORAL SEE ADMIN INSTRUCTIONS
Qty: 6 TABLET | Refills: 0 | Status: SHIPPED | OUTPATIENT
Start: 2020-10-29 | End: 2020-11-03

## 2020-10-29 NOTE — TELEPHONE ENCOUNTER
pls call. chest X-ray abnormal.  Antibiotic ordered.  Repeat chest X-ray in a month is symptoms persisting

## 2020-10-29 NOTE — TELEPHONE ENCOUNTER
[phone] (home)   Left message for patient to call back. Hourly rounding completed. Patient's needs addressed and updated on plan of care.

## 2020-11-02 RX ORDER — FLUTICASONE PROPIONATE 50 MCG
SPRAY, SUSPENSION (ML) NASAL
Qty: 1 BOTTLE | Refills: 0 | Status: SHIPPED | OUTPATIENT
Start: 2020-11-02 | End: 2020-12-02

## 2020-12-02 RX ORDER — FLUTICASONE PROPIONATE 50 MCG
SPRAY, SUSPENSION (ML) NASAL
Qty: 1 BOTTLE | Refills: 0 | Status: SHIPPED | OUTPATIENT
Start: 2020-12-02 | End: 2021-01-25

## 2021-01-20 ENCOUNTER — VIRTUAL VISIT (OUTPATIENT)
Dept: FAMILY MEDICINE CLINIC | Age: 51
End: 2021-01-20
Payer: COMMERCIAL

## 2021-01-20 DIAGNOSIS — F41.9 ANXIETY: Primary | ICD-10-CM

## 2021-01-20 DIAGNOSIS — Z20.822 EXPOSURE TO COVID-19 VIRUS: ICD-10-CM

## 2021-01-20 DIAGNOSIS — F51.01 PRIMARY INSOMNIA: ICD-10-CM

## 2021-01-20 PROCEDURE — G8417 CALC BMI ABV UP PARAM F/U: HCPCS | Performed by: FAMILY MEDICINE

## 2021-01-20 PROCEDURE — 3017F COLORECTAL CA SCREEN DOC REV: CPT | Performed by: FAMILY MEDICINE

## 2021-01-20 PROCEDURE — G8484 FLU IMMUNIZE NO ADMIN: HCPCS | Performed by: FAMILY MEDICINE

## 2021-01-20 PROCEDURE — 1036F TOBACCO NON-USER: CPT | Performed by: FAMILY MEDICINE

## 2021-01-20 PROCEDURE — G8427 DOCREV CUR MEDS BY ELIG CLIN: HCPCS | Performed by: FAMILY MEDICINE

## 2021-01-20 PROCEDURE — 99213 OFFICE O/P EST LOW 20 MIN: CPT | Performed by: FAMILY MEDICINE

## 2021-01-20 RX ORDER — ALPRAZOLAM 2 MG/1
2 TABLET ORAL 2 TIMES DAILY PRN
Qty: 45 TABLET | Refills: 2 | Status: SHIPPED | OUTPATIENT
Start: 2021-01-20 | End: 2021-04-14 | Stop reason: SDUPTHER

## 2021-01-20 RX ORDER — CLONAZEPAM 1 MG/1
2 TABLET ORAL NIGHTLY PRN
Qty: 60 TABLET | Refills: 2 | Status: SHIPPED | OUTPATIENT
Start: 2021-01-20 | End: 2021-04-14 | Stop reason: SDUPTHER

## 2021-01-20 SDOH — ECONOMIC STABILITY: FOOD INSECURITY: WITHIN THE PAST 12 MONTHS, THE FOOD YOU BOUGHT JUST DIDN'T LAST AND YOU DIDN'T HAVE MONEY TO GET MORE.: NEVER TRUE

## 2021-01-20 SDOH — ECONOMIC STABILITY: TRANSPORTATION INSECURITY
IN THE PAST 12 MONTHS, HAS THE LACK OF TRANSPORTATION KEPT YOU FROM MEDICAL APPOINTMENTS OR FROM GETTING MEDICATIONS?: NO

## 2021-01-20 SDOH — ECONOMIC STABILITY: FOOD INSECURITY: WITHIN THE PAST 12 MONTHS, YOU WORRIED THAT YOUR FOOD WOULD RUN OUT BEFORE YOU GOT MONEY TO BUY MORE.: NEVER TRUE

## 2021-01-20 SDOH — ECONOMIC STABILITY: TRANSPORTATION INSECURITY
IN THE PAST 12 MONTHS, HAS LACK OF TRANSPORTATION KEPT YOU FROM MEETINGS, WORK, OR FROM GETTING THINGS NEEDED FOR DAILY LIVING?: NOT ASKED

## 2021-01-25 RX ORDER — FLUTICASONE PROPIONATE 50 MCG
SPRAY, SUSPENSION (ML) NASAL
Qty: 1 BOTTLE | Refills: 0 | Status: SHIPPED | OUTPATIENT
Start: 2021-01-25 | End: 2021-02-23

## 2021-02-03 ENCOUNTER — OFFICE VISIT (OUTPATIENT)
Dept: DERMATOLOGY | Age: 51
End: 2021-02-03
Payer: COMMERCIAL

## 2021-02-03 VITALS — TEMPERATURE: 97 F

## 2021-02-03 DIAGNOSIS — L21.9 SEBORRHEIC DERMATITIS: ICD-10-CM

## 2021-02-03 DIAGNOSIS — L82.1 SEBORRHEIC KERATOSES: ICD-10-CM

## 2021-02-03 DIAGNOSIS — L82.0 SEBORRHEIC KERATOSES, INFLAMED: Primary | ICD-10-CM

## 2021-02-03 DIAGNOSIS — D22.9 BENIGN NEVUS: ICD-10-CM

## 2021-02-03 DIAGNOSIS — L71.9 ROSACEA: ICD-10-CM

## 2021-02-03 PROCEDURE — 3017F COLORECTAL CA SCREEN DOC REV: CPT | Performed by: DERMATOLOGY

## 2021-02-03 PROCEDURE — 1036F TOBACCO NON-USER: CPT | Performed by: DERMATOLOGY

## 2021-02-03 PROCEDURE — 17110 DESTRUCTION B9 LES UP TO 14: CPT | Performed by: DERMATOLOGY

## 2021-02-03 PROCEDURE — G8427 DOCREV CUR MEDS BY ELIG CLIN: HCPCS | Performed by: DERMATOLOGY

## 2021-02-03 PROCEDURE — G8484 FLU IMMUNIZE NO ADMIN: HCPCS | Performed by: DERMATOLOGY

## 2021-02-03 PROCEDURE — 99214 OFFICE O/P EST MOD 30 MIN: CPT | Performed by: DERMATOLOGY

## 2021-02-03 PROCEDURE — G8417 CALC BMI ABV UP PARAM F/U: HCPCS | Performed by: DERMATOLOGY

## 2021-02-03 RX ORDER — KETOCONAZOLE 20 MG/ML
SHAMPOO TOPICAL
Qty: 120 ML | Refills: 11 | Status: SHIPPED | OUTPATIENT
Start: 2021-02-03 | End: 2022-02-10

## 2021-02-03 NOTE — PROGRESS NOTES
[facility]) Dermatology  Royal Ivan M.D.  [phone]       Joanne Lomax  1970    44 y.o. male     Date of Visit: 2/3/2021    Chief Complaint:   Chief Complaint   Patient presents with    Skin Lesion        I was asked to see this patient by Dr. Harrison ref. provider found. History of Present Illness:  1. Total-body skin exam    Increasing number of seborrheic keratoses over his scalp, back-increasing in size and number. Mostly asymptomatic but has 2 papules right anterior scalp at hairline that are becoming traumatized-raised, scaly, have been bothersome. Multiple nevi. Stable in size, shape, color. Has not noticed any new or changing pigmented lesions. Does wear hats and sunscreen. No personal or family history of skin cancer    Seborrheic dermatitis-washes his scalp and face with ketoconazole 2% shampoo-this has been helpful. Uses this regularly. Needs a refill. Rosacea-prominent background erythema but not developing inflammatory papules currently. Is using MetroCream 0.75% consistently. Happy with his improvement        Review of Systems:  Constitutional: Reports general sense of well-being       Past Medical History, Surgical History, Family History, Medications and Allergies reviewed.     Social History:   Social History     Socioeconomic History    Marital status:      Spouse name: Not on file    Number of children: Not on file    Years of education: Not on file    Highest education level: Not on file   Occupational History    Not on file   Social Needs    Financial resource strain: Not hard at all   [employer] insecurity     Worry: Never true     Inability: Never true   [employer] needs     Medical: No     Non-medical: Not on file   Tobacco Use    Smoking status: Former Smoker     Years: 2.00     Types: Cigars    Smokeless tobacco: Never Used   Substance and Sexual Activity    Alcohol use: No    Drug use: No    Sexual activity: Yes     Partners: Female Comment: ; 3 children   Lifestyle    Physical activity     Days per week: Not on file     Minutes per session: Not on file    Stress: Not on file   Relationships    Social connections     Talks on phone: Not on file     Gets together: Not on file     Attends Protestant service: Not on file     Active member of club or organization: Not on file     Attends meetings of clubs or organizations: Not on file     Relationship status: Not on file    Intimate partner violence     Fear of current or ex partner: Not on file     Emotionally abused: Not on file     Physically abused: Not on file     Forced sexual activity: Not on file   Other Topics Concern    Not on file   Social History Narrative    Not on file       Physical Examination       -General: Well-appearing, NAD  1. Normal affect. Total body skin exam including scalp, face, neck, chest, abdomen, back, bilateral upper extremities, bilateral lower extremities, ocular conjunctiva, external lips, and nails was performed. Examination normal unless stated below. Underwear area not examined. Scattered on the trunk and extremities are multiple well-defined round and oval symmetric smoothly-bordered uniformly brown macules and papules. No active seborrheic dermatitis scalp. Mild background erythema nose and cheeks  Multiple hyperkeratotic stuck on papules over his torso and scalp. Right anterior scalp hyperkeratotic stuck on tan papule x2        Assessment and Plan     1. Seborrheic keratoses, inflamed -right anterior scalp-2 lesion(s) treated w/ liquid nitrogen. Edu re: risk of blister formation, discomfort, scar, hypopigmentation. Discussed wound care. 2. Seborrheic keratoses-monitor for change-discussed treatment of symptomatic lesions   3.  Benign nevus - Benign acquired melanocytic nevi  -Recommend monthly self skin exams   -Educated regarding the ABCDEs of melanoma detection   -Call for any new/changing moles or concerning lesions  -Reviewed sun protective behavior -- sun avoidance during the peak hours of the day, sun-protective clothing (including hat and sunglasses), sunscreen use (water resistant, broad spectrum, SPF at least 30, need for reapplication every 2 to 3 hours), avoidance of tanning beds      4. Seborrheic dermatitis-continue ketoconazole 2% shampoo scalp and face-consistent versus episodic use. 5. Rosacea-MetroCream 0.75% daily to twice daily for prevention.

## 2021-02-23 RX ORDER — FLUTICASONE PROPIONATE 50 MCG
SPRAY, SUSPENSION (ML) NASAL
Qty: 1 BOTTLE | Refills: 0 | Status: SHIPPED | OUTPATIENT
Start: 2021-02-23 | End: 2021-03-22

## 2021-04-14 ENCOUNTER — VIRTUAL VISIT (OUTPATIENT)
Dept: FAMILY MEDICINE CLINIC | Age: 51
End: 2021-04-14
Payer: COMMERCIAL

## 2021-04-14 DIAGNOSIS — F41.9 ANXIETY: ICD-10-CM

## 2021-04-14 DIAGNOSIS — F51.01 PRIMARY INSOMNIA: ICD-10-CM

## 2021-04-14 PROCEDURE — G8427 DOCREV CUR MEDS BY ELIG CLIN: HCPCS | Performed by: FAMILY MEDICINE

## 2021-04-14 PROCEDURE — G8417 CALC BMI ABV UP PARAM F/U: HCPCS | Performed by: FAMILY MEDICINE

## 2021-04-14 PROCEDURE — 99213 OFFICE O/P EST LOW 20 MIN: CPT | Performed by: FAMILY MEDICINE

## 2021-04-14 PROCEDURE — 3017F COLORECTAL CA SCREEN DOC REV: CPT | Performed by: FAMILY MEDICINE

## 2021-04-14 PROCEDURE — 1036F TOBACCO NON-USER: CPT | Performed by: FAMILY MEDICINE

## 2021-04-14 RX ORDER — ALPRAZOLAM 2 MG/1
2 TABLET ORAL 2 TIMES DAILY PRN
Qty: 45 TABLET | Refills: 2 | Status: SHIPPED | OUTPATIENT
Start: 2021-04-14 | End: 2021-07-16 | Stop reason: SDUPTHER

## 2021-04-14 RX ORDER — CLONAZEPAM 1 MG/1
2 TABLET ORAL NIGHTLY PRN
Qty: 60 TABLET | Refills: 2 | Status: SHIPPED | OUTPATIENT
Start: 2021-04-14 | End: 2021-07-16 | Stop reason: SDUPTHER

## 2021-04-14 NOTE — PROGRESS NOTES
Felix Valero (:  1970) is a 44 y.o. male,Established patient, here for evaluation of the following chief complaint(s): 3 Month Follow-Up      ASSESSMENT/PLAN:  1. Anxiety  -     ALPRAZolam (XANAX) 2 MG tablet; Take 1 tablet by mouth 2 times daily as needed for Anxiety for up to 90 days. , Disp-45 tablet, R-2Normal  Stable on xanax  Controlled Substances Monitoring: Periodic Controlled Substance Monitoring: Possible medication side effects, risk of tolerance/dependence & alternative treatments discussed., No signs of potential drug abuse or diversion identified. Dinesh Marroquin MD)   2. Primary insomnia  -     clonazePAM (KLONOPIN) 1 MG tablet; Take 2 tablets by mouth nightly as needed (sleep) for up to 90 days. , Disp-60 tablet, R-2Normal  Not well controlled. Continue klonopin and follow up with sleep medicine  Compliant with cpap  Controlled Substances Monitoring: Periodic Controlled Substance Monitoring: Possible medication side effects, risk of tolerance/dependence & alternative treatments discussed., No signs of potential drug abuse or diversion identified. Dinesh Marroquin MD)   No follow-ups on file. SUBJECTIVE/OBJECTIVE:  HPI   Pt is a of 44 y.o. male comes in today with   Chief Complaint   Patient presents with    3 Month Follow-Up     Feels like klonopin not working as well for sleep. Used to be more helpful. Failed multiple sleep meds in the past including the hypnotics. On chronic pain med for pancreatitis. Review of Systems    Patient-Reported Vitals 2021   Patient-Reported Weight 186 lb   Patient-Reported Height 5' 9.5\"   Patient-Reported Pulse 58   Patient-Reported Temperature 97.6        Physical Exam              Felix Valero, was evaluated through a synchronous (real-time) audio-video encounter. The patient (or guardian if applicable) is aware that this is a billable service. Verbal consent to proceed has been obtained within the past 12 months.  The visit was conducted pursuant to the emergency declaration under the [address] [facility], [address] [place] [address] authority and the [employer] [place] [employer] [place] and Differential General Act. Patient identification was verified, and a caregiver was present when appropriate. The patient was located in a state where the provider was credentialed to provide care. An electronic signature was used to authenticate this note.     --Jay Johnston MD

## 2021-04-20 DIAGNOSIS — I10 BENIGN HYPERTENSION: ICD-10-CM

## 2021-04-20 RX ORDER — FLUTICASONE PROPIONATE 50 MCG
SPRAY, SUSPENSION (ML) NASAL
Qty: 1 BOTTLE | Refills: 0 | Status: SHIPPED | OUTPATIENT
Start: 2021-04-20 | End: 2021-05-19

## 2021-04-20 NOTE — TELEPHONE ENCOUNTER
Last Fill 3/22/21  Last Office Visit 4/14/21   Return in about 3 months (around 7/14/2021).   No Pending Appointments

## 2021-04-21 RX ORDER — LISINOPRIL 20 MG/1
TABLET ORAL
Qty: 90 TABLET | Refills: 1 | Status: SHIPPED | OUTPATIENT
Start: 2021-04-21 | End: 2021-12-03 | Stop reason: ALTCHOICE

## 2021-04-27 ENCOUNTER — OFFICE VISIT (OUTPATIENT)
Dept: CARDIOLOGY CLINIC | Age: 51
End: 2021-04-27
Payer: COMMERCIAL

## 2021-04-27 VITALS
WEIGHT: 190.6 LBS | DIASTOLIC BLOOD PRESSURE: 70 MMHG | SYSTOLIC BLOOD PRESSURE: 110 MMHG | OXYGEN SATURATION: 98 % | HEIGHT: 69 IN | TEMPERATURE: 97.2 F | HEART RATE: 75 BPM | BODY MASS INDEX: 28.23 KG/M2

## 2021-04-27 DIAGNOSIS — R00.1 BRADYCARDIA: ICD-10-CM

## 2021-04-27 DIAGNOSIS — E78.2 MIXED HYPERLIPIDEMIA: ICD-10-CM

## 2021-04-27 DIAGNOSIS — I77.810 AORTIC ROOT DILATATION (HCC): ICD-10-CM

## 2021-04-27 DIAGNOSIS — R53.82 CHRONIC FATIGUE: ICD-10-CM

## 2021-04-27 DIAGNOSIS — I71.02 DISSECTION OF ABDOMINAL AORTA (HCC): ICD-10-CM

## 2021-04-27 DIAGNOSIS — I10 BENIGN HYPERTENSION: ICD-10-CM

## 2021-04-27 PROCEDURE — G8427 DOCREV CUR MEDS BY ELIG CLIN: HCPCS | Performed by: INTERNAL MEDICINE

## 2021-04-27 PROCEDURE — G8417 CALC BMI ABV UP PARAM F/U: HCPCS | Performed by: INTERNAL MEDICINE

## 2021-04-27 PROCEDURE — 99214 OFFICE O/P EST MOD 30 MIN: CPT | Performed by: INTERNAL MEDICINE

## 2021-04-27 PROCEDURE — 1036F TOBACCO NON-USER: CPT | Performed by: INTERNAL MEDICINE

## 2021-04-27 PROCEDURE — 3017F COLORECTAL CA SCREEN DOC REV: CPT | Performed by: INTERNAL MEDICINE

## 2021-04-27 NOTE — PROGRESS NOTES
[address] Office Note  4/27/2021     Subjective:  Mr. Padmini Rogers is here for cardiology follow up of HBP abdominal aortic localized dissection but no aneurysm. Hyperlipidemia, he is concerned about his heart rate slowing down during sleep as per his watch which records it every 10 minutes. He c/o fatigue but no SOB dizziness or chest pain. [place]   Today he reports he has had covid virus twice once in August and then again end of January. He had numerous side effects like brain fog, taste smell disturbance severe fatigue  Weight loss, probable pancreatitis. From a cardiac standpoint he is feeling well with no complaints. Denies chest pain, shortness of breath, edema, dizziness, palpitations and syncope. His home BP readings has been running really good unless he is overly anxious or active. He reports terrible insomnia at night and his  heart rate runs low at night time. During day he will get alerts showing his heart rates are dipping down low. He is not able to explain if he feels bad during low heart rates. He took both the Bob Craw vaccine     PMH of possible aortic dissection and hypertension. He had previously been seen by Dr Kartik Aviles. He reports undergoing cardiology evaluation for chest pain and testing was wnl and felt anxiety/sytress related. CT scan of chest was obtained. He reports being diagnosed in January 2015 with pituitary tumor, extensive testing at [facility] and currently being watched for next 6 months. He was diagnosed with acromegaly. He reports chronic HA's which increase with blood pressure. He was prescribed Nadolol which has helped HA;s. His abdominal US from 10/2019 showed no aneurysm. He's concerned he has aortic aneurysm and reports Dr. Darling Moreno states last CT scan was 2009 and needs to be followed. Most recent US for AAA 10/30/19 No evidence of abdominal aortic aneurysm. [facility]. 2020  for chest pain and tingling.  His pain started in 2/3/20. The pain began in the morning in on the right side of his chest. This progressed throughout the day to the point that he had difficulty breathing. He attempted to raise his arms up to attempt to get more oxygen. This movement worsened his pain. The pain was continuous the whole day, and worsened when he attempted to breath. He checked his heart rate and noted it to be 160. He denies any fever, chills or cough. He took Ibuprofen that evening and the pain did subside slightly. While he was sleeping that night, he woke up in a sweat. He then presented to the ER the next day as he developed numbness and tingling in his left arm/finger tips. According to the records that he brought from [facility], CHF and TIA were noted on his discharge. He recalls being told that his stress test and CXR were normal. He was seen by a cardiologist who reportedly told him that his heart was okay. He has been checking his blood pressure at home and states it has been as high as 140s. Patient currently denies any weight gain, edema, palpitations, shortness of breath, dizziness, and syncope. Review of Systems:  12 point ROS negative in all areas as listed below except as in [place]  Constitutional, EENT, pulmonary, GI, , Musculoskeletal, skin, neurological, hematological, endocrine, Psychiatric      Reviewed past medical history, social, and family history. Non smoker quit 20 years ago, no alcohol, no illicit drugs  Family history is negative for premature coronary artery disease.   Past Medical History:   Diagnosis Date    Acromegalia ([address]     Anxiety     Chronic pain syndrome     Chronic pancreatitis (HCC)     Dyslipidemia     Elevated insulin-like growth factor 1 (IGF-1) level     Hypogonadism male     IFG (impaired fasting glucose)     Insomnia     Low serum cortisol level (HCC)     Neuropathic pain     Pancreatitis     Pituitary adenoma (HCC)     Secondary adrenal insufficiency (HCC)      Past Surgical History: Procedure Laterality Date    CHOLECYSTECTOMY      ERCP         Objective:   /70   Pulse 75   Temp 97.2 °F (36.2 °C)   Ht 5' 9\" (1.753 m)   Wt 190 lb 9.6 oz (86.5 kg)   SpO2 98%   BMI 28.15 kg/m²     Wt Readings from Last 3 Encounters:   04/27/21 190 lb 9.6 oz (86.5 kg)   08/19/20 228 lb (103.4 kg)   02/19/20 238 lb 9.6 oz (108.2 kg)       Physical Exam:  General: No Respiratory distress, appears well developed and well nourished. Eyes:  Sclera nonicteric periorbital edema  Nose/Sinuses:  negative findings: nose shows no deformity, asymmetry, or inflammation, nasal mucosa normal, septum midline with no perforation or bleeding  Back:  no pain to palpation  Joint:  no active joint inflammation  Musculoskeletal:  negative  Skin:  Warm and dry seaborric dermatitis on neck and face face skin red  Neck:  Negative for JVD and Carotid Bruits. Chest:  Clear to auscultation, respiration easy  Cardiovascular:  RRR, S1S2 normal, no murmur, no rub or thrill. Abdomen; non tender no mass no abn pulsations. Extremities:   No edema, clubbing, cyanosis,  Pulses: pedal pulses are normal.  Neuro: intact    Medications:   Outpatient Encounter Medications as of 4/27/2021   Medication Sig Dispense Refill    lisinopril (PRINIVIL;ZESTRIL) 20 MG tablet TAKE ONE TABLET BY MOUTH DAILY 90 tablet 1    fluticasone (FLONASE) 50 MCG/ACT nasal spray SPRAY TWO SPRAYS IN EACH NOSTRIL ONCE DAILY 1 Bottle 0    ALPRAZolam (XANAX) 2 MG tablet Take 1 tablet by mouth 2 times daily as needed for Anxiety for up to 90 days. 45 tablet 2    clonazePAM (KLONOPIN) 1 MG tablet Take 2 tablets by mouth nightly as needed (sleep) for up to 90 days. 60 tablet 2    ketoconazole (NIZORAL) 2 % shampoo Wash as needed 120 mL 11    metroNIDAZOLE (METROCREAM) 0.75 % cream Apply topically 2 times daily.  60 g 11    Biotin 49447 MCG TABS Take 1 tablet by mouth daily      azelastine (ASTELIN) 0.1 % nasal spray 2 sprays by Nasal route 2 times daily Use Results   Component Value Date    LDLCALC 119 08/21/2018    LDLCALC 130 (H) 07/21/2017    LDLCALC 110 (H) 05/23/2016    LDLCHOLESTEROL 128 10/24/2014     Lab Results   Component Value Date    LABVLDL 27 07/21/2017    LABVLDL 29 05/23/2016    LABVLDL 32 01/14/2016    VLDL 34 08/21/2018     Lab Results   Component Value Date    CHOLHDLRATIO 7.4 08/21/2018    CHOLHDLRATIO 5.7 (H) 05/25/2010     PT/INR: No results for input(s): PROTIME, INR in the last 72 hours. A1C:   Lab Results   Component Value Date    LABA1C 5.4 05/23/2016     BNP:  No results for input(s): BNP in the last 72 hours. IMAGING:  I have reviewed the following tests and documented in this encounter as follows:   Discussed with patient  CXR 10/23/20  Mild elevation of right hemidiaphragm mild airspace disease RML likely atelectasis    Graded exercise stress test 2/5/20 ([address])         CXR 2/4/20      US AAA 10/30/19   No evidence of abdominal aortic aneurysm. EKG 8/8/18  NSR normal  ECHO 7/26/18  Summary   Normal LV systolic function with an estimated EF of 55%.   There is mild concentric left ventricular hypertrophy.   No regional wall motion abnormalities are seen.   Normal left ventricular diastolic filling pressure.   Mild mitral, aortic, and tricuspid regurgitation.     Carotid US 5/29/18  1. There is no gross plaque or stenosis in the internal carotid arteries  bilaterally. 2. The vertebral arteries are patent with antegrade flow bilaterally    Abdominal US 7/21/17  FINDINGS: Aorta:   Abdominal aorta is normal in caliber and not significantly changed from the prior study. Proximal aorta measures 1.6 x 1.3 cm. Mid aorta measures 1.6 x 1.3 cm. Distal aorta measure 1.2 x 1.4 cm. Vascular and Doppler interrogation is unremarkable. Iliacs:   Iliac arteries are patent and normal in caliber.     ECHO 6/23/17  Normal left ventricular systolic function with an estimated ejection   fraction of 55%.   Normal left

## 2021-05-19 RX ORDER — FLUTICASONE PROPIONATE 50 MCG
SPRAY, SUSPENSION (ML) NASAL
Qty: 1 BOTTLE | Refills: 2 | Status: SHIPPED | OUTPATIENT
Start: 2021-05-19 | End: 2021-10-08 | Stop reason: SDUPTHER

## 2021-05-19 RX ORDER — AZELASTINE 1 MG/ML
SPRAY, METERED NASAL
Qty: 1 BOTTLE | Refills: 2 | Status: SHIPPED | OUTPATIENT
Start: 2021-05-19 | End: 2021-08-10

## 2021-06-04 ENCOUNTER — VIRTUAL VISIT (OUTPATIENT)
Dept: FAMILY MEDICINE CLINIC | Age: 51
End: 2021-06-04
Payer: COMMERCIAL

## 2021-06-04 DIAGNOSIS — J01.90 ACUTE BACTERIAL SINUSITIS: Primary | ICD-10-CM

## 2021-06-04 DIAGNOSIS — B96.89 ACUTE BACTERIAL SINUSITIS: Primary | ICD-10-CM

## 2021-06-04 PROCEDURE — 99213 OFFICE O/P EST LOW 20 MIN: CPT | Performed by: NURSE PRACTITIONER

## 2021-06-04 PROCEDURE — G8417 CALC BMI ABV UP PARAM F/U: HCPCS | Performed by: NURSE PRACTITIONER

## 2021-06-04 PROCEDURE — 1036F TOBACCO NON-USER: CPT | Performed by: NURSE PRACTITIONER

## 2021-06-04 PROCEDURE — G8427 DOCREV CUR MEDS BY ELIG CLIN: HCPCS | Performed by: NURSE PRACTITIONER

## 2021-06-04 PROCEDURE — 3017F COLORECTAL CA SCREEN DOC REV: CPT | Performed by: NURSE PRACTITIONER

## 2021-06-04 RX ORDER — CEFDINIR 300 MG/1
300 CAPSULE ORAL 2 TIMES DAILY
Qty: 20 CAPSULE | Refills: 0 | Status: SHIPPED | OUTPATIENT
Start: 2021-06-04 | End: 2021-06-14

## 2021-06-04 NOTE — PROGRESS NOTES
Kailey Ceron (:  1970) is a 44 y.o. male,Established patient, here for evaluation of the following chief complaint(s): Sinus Problem         ASSESSMENT/PLAN:  1. Acute bacterial sinusitis  Stable;  Continue current regimen- nasal sprays. Begin cefdinir. Encourage plenty of fluids. Return if symptoms worsen or fail to improve. SUBJECTIVE/OBJECTIVE:  HPI  5-7 days ago symptoms started; yesterday was a little worse than today  Has allergies- flonase, astelin- helped  Every so often started having pain above one tooth, spread across; when doesn't have sinus issues doesn't have any problems with teeth  Can breathe out of both nostrils with the help of the nasal spray  Right sided facial pain  + headaches  + runny nose  No fevers or chills  No sore throat  + PND  Ice- helped  Ibuprofen, Tylenol    Review of Systems    Patient-Reported Vitals 2021   Patient-Reported Weight 184.5 lb   Patient-Reported Height 5' 9\"   Patient-Reported Systolic 926   Patient-Reported Diastolic 67   Patient-Reported Pulse 60   Patient-Reported Temperature 97.2        Physical Exam  Constitutional:       Appearance: Normal appearance. HENT:      Head: Normocephalic. Right Ear: External ear normal.      Left Ear: External ear normal.      Nose:      Right Sinus: Maxillary sinus tenderness present. Left Sinus: Maxillary sinus tenderness present. Comments: Per patient  Pulmonary:      Effort: No respiratory distress. Neurological:      Mental Status: He is alert. Psychiatric:         Mood and Affect: Mood normal.         Kailey Ceron, was evaluated through a synchronous (real-time) audio-video encounter. The patient (or guardian if applicable) is aware that this is a billable service. Verbal consent to proceed has been obtained within the past 12 months.  The visit was conducted pursuant to the emergency declaration under the [facility] waiver authority and the [employer] and [employer] General Act. Patient identification was verified, and a caregiver was present when appropriate. The patient was located in a state where the provider was credentialed to provide care. An electronic signature was used to authenticate this note.     --RENO Fernandes - CNP

## 2021-06-15 RX ORDER — DOXYCYCLINE HYCLATE 100 MG
100 TABLET ORAL 2 TIMES DAILY
Qty: 14 TABLET | Refills: 0 | Status: SHIPPED | OUTPATIENT
Start: 2021-06-15 | End: 2021-06-22

## 2021-07-14 DIAGNOSIS — F51.01 PRIMARY INSOMNIA: ICD-10-CM

## 2021-07-14 DIAGNOSIS — F41.9 ANXIETY: ICD-10-CM

## 2021-07-14 RX ORDER — CLONAZEPAM 1 MG/1
2 TABLET ORAL NIGHTLY PRN
Qty: 60 TABLET | Refills: 2 | OUTPATIENT
Start: 2021-07-14 | End: 2021-10-12

## 2021-07-14 RX ORDER — ALPRAZOLAM 2 MG/1
2 TABLET ORAL 2 TIMES DAILY PRN
Qty: 45 TABLET | Refills: 2 | OUTPATIENT
Start: 2021-07-14 | End: 2021-10-12

## 2021-07-14 RX ORDER — FLUTICASONE PROPIONATE 50 MCG
SPRAY, SUSPENSION (ML) NASAL
Qty: 1 BOTTLE | Refills: 0 | Status: SHIPPED | OUTPATIENT
Start: 2021-07-14 | End: 2021-09-07

## 2021-07-16 ENCOUNTER — VIRTUAL VISIT (OUTPATIENT)
Dept: FAMILY MEDICINE CLINIC | Age: 51
End: 2021-07-16
Payer: COMMERCIAL

## 2021-07-16 DIAGNOSIS — F51.01 PRIMARY INSOMNIA: Primary | ICD-10-CM

## 2021-07-16 DIAGNOSIS — B34.9 VIRAL ILLNESS: ICD-10-CM

## 2021-07-16 DIAGNOSIS — R41.3 MEMORY CHANGES: ICD-10-CM

## 2021-07-16 DIAGNOSIS — F41.9 ANXIETY: ICD-10-CM

## 2021-07-16 PROCEDURE — 99214 OFFICE O/P EST MOD 30 MIN: CPT | Performed by: NURSE PRACTITIONER

## 2021-07-16 PROCEDURE — G8417 CALC BMI ABV UP PARAM F/U: HCPCS | Performed by: NURSE PRACTITIONER

## 2021-07-16 PROCEDURE — 1036F TOBACCO NON-USER: CPT | Performed by: NURSE PRACTITIONER

## 2021-07-16 PROCEDURE — G8428 CUR MEDS NOT DOCUMENT: HCPCS | Performed by: NURSE PRACTITIONER

## 2021-07-16 PROCEDURE — 3017F COLORECTAL CA SCREEN DOC REV: CPT | Performed by: NURSE PRACTITIONER

## 2021-07-16 RX ORDER — CLONAZEPAM 1 MG/1
2 TABLET ORAL NIGHTLY PRN
Qty: 60 TABLET | Refills: 2 | Status: SHIPPED | OUTPATIENT
Start: 2021-07-16 | End: 2021-10-08 | Stop reason: SDUPTHER

## 2021-07-16 RX ORDER — ALPRAZOLAM 2 MG/1
2 TABLET ORAL 2 TIMES DAILY PRN
Qty: 45 TABLET | Refills: 2 | Status: SHIPPED | OUTPATIENT
Start: 2021-07-16 | End: 2021-10-08 | Stop reason: SDUPTHER

## 2021-07-16 NOTE — PROGRESS NOTES
Jayshree Mirza (:  1970) is a 44 y.o. male,Established patient, here for evaluation of the following chief complaint(s): Sinusitis (\"brain fog\") and Medication Check         ASSESSMENT/PLAN:  1. Primary insomnia  Stable;  Continue current regimen. -     clonazePAM (KLONOPIN) 1 MG tablet; Take 2 tablets by mouth nightly as needed (sleep) for up to 90 days. , Disp-60 tablet, R-2Normal  2. Anxiety  Stable;  Continue current regimen.  -     ALPRAZolam (XANAX) 2 MG tablet; Take 1 tablet by mouth 2 times daily as needed for Anxiety for up to 90 days. , Disp-45 tablet, R-2Normal  3. Viral illness  Stable; No symptoms currently. Per patient s/p COVID. Discussed might have brain fog/ memory changes d/t this. Continue to play [employer] and encouraged patient to try brain games. 4. Memory changes  Stable;  See 3    Return in about 3 months (around 10/16/2021), or if symptoms worsen or fail to improve.        SUBJECTIVE/OBJECTIVE:  HPI  Anxiety/ insomnia  Saw sleep medicine (Dr. Nirav Virk)- spoke to him  Has been on Xanax and clonazepam for a long time- sleep doctor suggested a medication that could have the SE of lowering his cortisol level- he cannot take- unsure of the medication name; does not want to make changes  Takes the medication, waits 1 hour (makes him a little sleepy), feels like he has a window to go to bed  Goes to bed Midnight to varies  Does not have a cpap- has been tested a couple times; dx with insomnia really bad    Sinusitis  X 17 days + time afterwards  Got a lot better  Continues to feel better    Viral illness  Had 2 bouts of COVID- 1st one tested negative- was certain by symptoms it was positive- lasted a while  January- February lasted 1 week  Ability to remember is affected- comes and goes; is not all the time  Early this week- woke up, shivering, temperature (low grade)- took Tylenol- did not help; a couple hours later temperature 102  Felt like he couldn't hold his thoughts together- feels brain fog  Does have ADHD- does not take medication d/t taking Xanax and clonazepam  Will have to rewind tv shows  Started playing guThing5r    Has been vaccinated    Review of Systems    Patient-Reported Vitals 6/4/2021   Patient-Reported Weight 184.5 lb   Patient-Reported Height 5' 9\"   Patient-Reported Systolic 241   Patient-Reported Diastolic 67   Patient-Reported Pulse 60   Patient-Reported Temperature 97.2        Physical Exam  Constitutional:       Appearance: Normal appearance. HENT:      Head: Normocephalic. Right Ear: External ear normal.      Left Ear: External ear normal.      Nose: Nose normal.   Pulmonary:      Effort: No respiratory distress. Neurological:      Mental Status: He is alert. Psychiatric:         Mood and Affect: Mood normal.         Speech: Speech normal.         Behavior: Behavior normal.          Hina Guzman, was evaluated through a synchronous (real-time) audio-video encounter. The patient (or guardian if applicable) is aware that this is a billable service. Verbal consent to proceed has been obtained within the past 12 months. The visit was conducted pursuant to the emergency declaration under the [address] [place] [address], [address] [place] [address] authority and the [employer] and "[employer] General Act. Patient identification was verified, and a caregiver was present when appropriate. The patient was located in a state where the provider was credentialed to provide care. An electronic signature was used to authenticate this note.     --RENO Mak - CNP

## 2021-08-10 RX ORDER — AZELASTINE 1 MG/ML
SPRAY, METERED NASAL
Qty: 1 BOTTLE | Refills: 1 | Status: SHIPPED | OUTPATIENT
Start: 2021-08-10 | End: 2021-10-08 | Stop reason: SDUPTHER

## 2021-08-26 ENCOUNTER — TELEPHONE (OUTPATIENT)
Dept: FAMILY MEDICINE CLINIC | Age: 51
End: 2021-08-26

## 2021-08-26 NOTE — TELEPHONE ENCOUNTER
Pt called to let Dr. Andrea Joyner know that he's been trying to reach [facility] office for about 2 week but unable to get through. [facility] Rep also tried and was unable to contact them. Please contact pt and advise what his next step should be. I IM'd a rep but have received no response.

## 2021-08-27 NOTE — TELEPHONE ENCOUNTER
I attempted to call the patient to see why he's trying to reach endocrinology. I left a voice mail with our phone number. If he is interested in becoming a patient he will need to have a referral placed in the system before he can be scheduled.

## 2021-08-30 ENCOUNTER — PATIENT MESSAGE (OUTPATIENT)
Dept: FAMILY MEDICINE CLINIC | Age: 51
End: 2021-08-30

## 2021-08-30 DIAGNOSIS — E29.1 HYPOGONADISM MALE: ICD-10-CM

## 2021-08-30 DIAGNOSIS — E27.49 SECONDARY ADRENAL INSUFFICIENCY (HCC): Primary | ICD-10-CM

## 2021-08-30 NOTE — TELEPHONE ENCOUNTER
From: Rebecca Thomson  To: Bev Molina MD  Sent: 8/30/2021 1:39 PM EDT  Subject: Visit Follow-Up Question    Hi. I am fully vaccinated. Had a bad case of Covid 19 last year although results were not positive from test. That whole thing messed me up. We've discussed it. Then I had it again. About 10 days. Didn't bother getting tested. the second time. Felt bad but not as bad as first time. Then in between shots had another case with same symptoms. Again just not as remarkable or last as long. That was March this year. 4/20 was final Mrena shot. No problems since until now. My 8 yr old seems to have a cold but no fever. She has chest congestion and a cough and runny nose. She's been wearing a mask at school ( even though I think it's unhealthy for a young person to be wearing masks). Started Friday we think. As far as we know no spreading around house yet. I was exhausted yesterday and not able to sleep well with insomnia. Woke with minor fever but was shaking. Took 2 extra strength Tylenol. Thought fever broke but it didn't. Still running fever 99.7 on digital. I just have fever snd feel bad. That's how I was before in the beginning. Sorry for all the back information. Just wanted to be sure you knew what was what. I was so out of the that first round of Covid that I forgot to take the second round of z pack you ordered. Now I know early treatment is important. So I'm wondering if I should go get tested with rapid or regular test? Should I start the z pack? If I go to [facility] they test 24/7. With a dr order they do whatever test you tell them. Without an order they do the rapid test, send test results to health dept who then contacts me. Could be same day. Not sure at all. All that said, would you like me to get the test and if so do you want to send an order or not? If so, the fax number they gave me for you is [phone].      Thank you     Neha Ramírez

## 2021-09-07 RX ORDER — FLUTICASONE PROPIONATE 50 MCG
SPRAY, SUSPENSION (ML) NASAL
Qty: 16 G | Refills: 2 | Status: SHIPPED | OUTPATIENT
Start: 2021-09-07 | End: 2021-10-08

## 2021-09-07 NOTE — TELEPHONE ENCOUNTER
Medication:   Requested Prescriptions     Pending Prescriptions Disp Refills    fluticasone (FLONASE) 50 MCG/ACT nasal spray [Pharmacy Med Name: FLUTICASONE PROP 50 MCG SPRAY] 16 g      Sig: SPRAY TWO SPRAYS IN EACH NOSTRIL ONCE DAILY     Last Filled:  07/14/21    Last appt: 7/16/2021   Next appt: 10/6/2021    Last OARRS:   RX Monitoring 4/14/2021   Attestation -   Periodic Controlled Substance Monitoring Possible medication side effects, risk of tolerance/dependence & alternative treatments discussed. ;No signs of potential drug abuse or diversion identified.

## 2021-10-06 ENCOUNTER — TELEPHONE (OUTPATIENT)
Dept: FAMILY MEDICINE CLINIC | Age: 51
End: 2021-10-06

## 2021-10-06 NOTE — TELEPHONE ENCOUNTER
Left message for patient to call back. Patient is currently scheduled as a virtual this afternoon, but due to changes in controlled substance regulations he does need to come in the office to complete his appointment.

## 2021-10-08 ENCOUNTER — OFFICE VISIT (OUTPATIENT)
Dept: FAMILY MEDICINE CLINIC | Age: 51
End: 2021-10-08
Payer: COMMERCIAL

## 2021-10-08 VITALS
TEMPERATURE: 97.4 F | RESPIRATION RATE: 16 BRPM | BODY MASS INDEX: 27.41 KG/M2 | OXYGEN SATURATION: 96 % | WEIGHT: 185.6 LBS | DIASTOLIC BLOOD PRESSURE: 64 MMHG | HEART RATE: 88 BPM | SYSTOLIC BLOOD PRESSURE: 86 MMHG

## 2021-10-08 DIAGNOSIS — F41.9 ANXIETY: Primary | ICD-10-CM

## 2021-10-08 DIAGNOSIS — F51.01 PRIMARY INSOMNIA: ICD-10-CM

## 2021-10-08 DIAGNOSIS — Z23 NEED FOR INFLUENZA VACCINATION: ICD-10-CM

## 2021-10-08 DIAGNOSIS — I10 BENIGN HYPERTENSION: ICD-10-CM

## 2021-10-08 PROCEDURE — G8417 CALC BMI ABV UP PARAM F/U: HCPCS | Performed by: FAMILY MEDICINE

## 2021-10-08 PROCEDURE — 3017F COLORECTAL CA SCREEN DOC REV: CPT | Performed by: FAMILY MEDICINE

## 2021-10-08 PROCEDURE — 1036F TOBACCO NON-USER: CPT | Performed by: FAMILY MEDICINE

## 2021-10-08 PROCEDURE — 90674 CCIIV4 VAC NO PRSV 0.5 ML IM: CPT | Performed by: FAMILY MEDICINE

## 2021-10-08 PROCEDURE — G8427 DOCREV CUR MEDS BY ELIG CLIN: HCPCS | Performed by: FAMILY MEDICINE

## 2021-10-08 PROCEDURE — 99214 OFFICE O/P EST MOD 30 MIN: CPT | Performed by: FAMILY MEDICINE

## 2021-10-08 PROCEDURE — 90471 IMMUNIZATION ADMIN: CPT | Performed by: FAMILY MEDICINE

## 2021-10-08 PROCEDURE — G8482 FLU IMMUNIZE ORDER/ADMIN: HCPCS | Performed by: FAMILY MEDICINE

## 2021-10-08 RX ORDER — ALPRAZOLAM 2 MG/1
2 TABLET ORAL 2 TIMES DAILY PRN
Qty: 45 TABLET | Refills: 2 | Status: SHIPPED | OUTPATIENT
Start: 2021-10-08 | End: 2022-01-10 | Stop reason: SDUPTHER

## 2021-10-08 RX ORDER — FLUTICASONE PROPIONATE 50 MCG
SPRAY, SUSPENSION (ML) NASAL
Qty: 1 EACH | Refills: 2 | Status: SHIPPED | OUTPATIENT
Start: 2021-10-08 | End: 2021-12-29 | Stop reason: SDUPTHER

## 2021-10-08 RX ORDER — CLONAZEPAM 1 MG/1
2 TABLET ORAL NIGHTLY PRN
Qty: 60 TABLET | Refills: 2 | Status: SHIPPED | OUTPATIENT
Start: 2021-10-08 | End: 2022-01-10 | Stop reason: SDUPTHER

## 2021-10-08 RX ORDER — TRIAMCINOLONE ACETONIDE 1 MG/G
CREAM TOPICAL
Qty: 80 G | Refills: 0 | Status: SHIPPED | OUTPATIENT
Start: 2021-10-08

## 2021-10-08 RX ORDER — AZELASTINE 1 MG/ML
SPRAY, METERED NASAL
Qty: 1 EACH | Refills: 3 | Status: SHIPPED | OUTPATIENT
Start: 2021-10-08 | End: 2022-02-10

## 2021-10-08 NOTE — PROGRESS NOTES
Yaquelin Bailey (:  1970) is a 46 y.o. male,Established patient, here for evaluation of the following chief complaint(s):  3 Month Follow-Up         ASSESSMENT/PLAN:  [place] was seen today for 3 month follow-up. Diagnoses and all orders for this visit:    Primary insomnia  Stable on klonopin  Anxiety  Stable on xanax. Takes this during the day but never with the klonopin  Benign hypertension  blood pressure too low. asymptomatic  Suggested stop lisinopril but he preferred to go to   Has follow up with cardiology scheduled       No follow-ups on file. Subjective   SUBJECTIVE/OBJECTIVE:  HPI   Pt is a of 46 y.o. male comes in today with   Chief Complaint   Patient presents with    3 Month Follow-Up     Struggling with insomnia. Klonopin not working as well. Anxiety stable on xanax. follow up with endo scheduled. Really bad itchy rash on lower legs. Still struggling with ADD. No longer on adderall. strattera did not help. Had presumed covid. Worse since then. Still has decreased smell. Vitals:    10/08/21 1507   BP: 86/64   Pulse: 88   Resp: 16   Temp: 97.4 °F (36.3 °C)   TempSrc: Tympanic   SpO2: 96%   Weight: 185 lb 9.6 oz (84.2 kg)        Review of Systems       Objective   Physical Exam         An electronic signature was used to authenticate this note.     --Zee Baker MD

## 2021-10-11 ENCOUNTER — TELEPHONE (OUTPATIENT)
Dept: CARDIOLOGY CLINIC | Age: 51
End: 2021-10-11

## 2021-10-11 NOTE — TELEPHONE ENCOUNTER
Patient states he was at his PCP office Kayden Ramirez MD. BP was noted to be 86/64. He stated Lisinopril was decreased from 20 mg to 10 mg tablet daily at office visit on 10/08/21. He is still experiencing fatigue, denies dizziness or syncope. His BP reading today was 108/68. He would like to know if he needs to continue this medication?

## 2021-10-11 NOTE — TELEPHONE ENCOUNTER
----- Message from Veronica Alvarez MD sent at 10/9/2021  5:44 PM EDT -----  Thyroid levels are normal

## 2021-12-03 ENCOUNTER — OFFICE VISIT (OUTPATIENT)
Dept: CARDIOLOGY CLINIC | Age: 51
End: 2021-12-03
Payer: COMMERCIAL

## 2021-12-03 VITALS
HEART RATE: 86 BPM | SYSTOLIC BLOOD PRESSURE: 118 MMHG | WEIGHT: 191 LBS | BODY MASS INDEX: 28.29 KG/M2 | OXYGEN SATURATION: 96 % | HEIGHT: 69 IN | DIASTOLIC BLOOD PRESSURE: 70 MMHG

## 2021-12-03 DIAGNOSIS — E78.2 MIXED HYPERLIPIDEMIA: ICD-10-CM

## 2021-12-03 DIAGNOSIS — I10 PRIMARY HYPERTENSION: ICD-10-CM

## 2021-12-03 DIAGNOSIS — Z79.899 MEDICATION MANAGEMENT: ICD-10-CM

## 2021-12-03 DIAGNOSIS — I71.02 DISSECTION OF ABDOMINAL AORTA (HCC): Primary | ICD-10-CM

## 2021-12-03 DIAGNOSIS — Q25.40 ABNORMALITY OF ABDOMINAL AORTA: ICD-10-CM

## 2021-12-03 PROCEDURE — G8417 CALC BMI ABV UP PARAM F/U: HCPCS | Performed by: INTERNAL MEDICINE

## 2021-12-03 PROCEDURE — 1036F TOBACCO NON-USER: CPT | Performed by: INTERNAL MEDICINE

## 2021-12-03 PROCEDURE — 3017F COLORECTAL CA SCREEN DOC REV: CPT | Performed by: INTERNAL MEDICINE

## 2021-12-03 PROCEDURE — 99214 OFFICE O/P EST MOD 30 MIN: CPT | Performed by: INTERNAL MEDICINE

## 2021-12-03 PROCEDURE — G8427 DOCREV CUR MEDS BY ELIG CLIN: HCPCS | Performed by: INTERNAL MEDICINE

## 2021-12-03 PROCEDURE — G8482 FLU IMMUNIZE ORDER/ADMIN: HCPCS | Performed by: INTERNAL MEDICINE

## 2021-12-03 NOTE — PATIENT INSTRUCTIONS
Plan:  1. Blood work ordered today- CBC, CMP, and fasting lipids. 2. Continue to stay off of lisinopril, check your BP at home. 3. Abdominal ultrasound. 4. Follow up in 6 months. Your provider has ordered testing for further evaluation. An order/prescription has been included in your paper work.  To schedule outpatient testing, contact Central Scheduling by calling [employer] ([phone]).

## 2021-12-03 NOTE — PROGRESS NOTES
[facility] Office Note  12/3/2021     Subjective:  Mr. Ramirez Arellano is here for cardiology follow up of HBP abdominal aortic localized dissection but no aneurysm. Hyperlipidemia, he is concerned about his weight being up compared to home weight with little clothes on and no shoes. He is no longer taking BP meds for last three weeks as his BP was low. [place]: Today, 12/3/2021, he states that he is doing ok. He states that his BP has been running ok at home. He reports that he has some anxiety that he deals with. He states that he recently switched endocrinologists. He has been taking fish oil for his cholesterol. He states that he is taking his medications as prescribed. Patient denies current edema, chest pain, sob, palpitations, dizziness or syncope. He took both the Nelly Anette vaccine     PMH of possible aortic dissection and hypertension. He had previously been seen by Dr Jan Lei. He reports undergoing cardiology evaluation for chest pain and testing was wnl and felt anxiety/sytress related. CT scan of chest was obtained. He reports being diagnosed in January 2015 with pituitary tumor, extensive testing at [facility] and currently being watched for next 6 months. He was diagnosed with acromegaly. He reports chronic HA's which increase with blood pressure. He was prescribed Nadolol which has helped HA;s. His abdominal US from 10/2019 showed no aneurysm. He's concerned he has aortic aneurysm and reports Dr. Nia Qiu states last CT scan was 2009 and needs to be followed. Most recent US for AAA 10/30/19 No evidence of abdominal aortic aneurysm. admitted at [facility] Feb[facility] 2020  for chest pain and tingling. His pain started in 2/3/20. The pain began in the morning in on the right side of his chest. This progressed throughout the day to the point that he had difficulty breathing. He attempted to raise his arms up to attempt to get more oxygen. This movement worsened his pain.  The pain was continuous the whole day, and worsened when he attempted to breath. He checked his heart rate and noted it to be 160. He denies any fever, chills or cough. He took Ibuprofen that evening and the pain did subside slightly. While he was sleeping that night, he woke up in a sweat. He then presented to the ER the next day as he developed numbness and tingling in his left arm/finger tips. According to the records that he brought from [facility], CHF and TIA were noted on his discharge. He recalls being told that his stress test and CXR were normal. He was seen by a cardiologist who reportedly told him that his heart was okay. He has been checking his blood pressure at home and states it has been as high as 140s. Patient currently denies any weight gain, edema, palpitations, shortness of breath, dizziness, and syncope. Review of Systems:  12 point ROS negative in all areas as listed below except as in [place]  Constitutional, EENT, pulmonary, GI, , Musculoskeletal, skin, neurological, hematological, endocrine, Psychiatric      Reviewed past medical history, social, and family history. Non smoker quit 20 years ago, no alcohol, no illicit drugs  Family history is negative for premature coronary artery disease.   Past Medical History:   Diagnosis Date    Acromegalia ([address]     Anxiety     Chronic pain syndrome     Chronic pancreatitis (HCC)     Dyslipidemia     Elevated insulin-like growth factor 1 (IGF-1) level     Hypogonadism male     IFG (impaired fasting glucose)     Insomnia     Low serum cortisol level (HCC)     Neuropathic pain     Pancreatitis     Pituitary adenoma (HCC)     Secondary adrenal insufficiency (HCC)      Past Surgical History:   Procedure Laterality Date    CHOLECYSTECTOMY      ERCP         Objective:   /70   Pulse 86   Ht 5' 9\" (1.753 m)   Wt 191 lb (86.6 kg)   SpO2 96%   BMI 28.21 kg/m²     Wt Readings from Last 3 Encounters:   12/03/21 191 lb (86.6 kg)   10/08/21 185 lb 9.6 oz (84.2 kg)   04/27/21 190 lb 9.6 oz (86.5 kg)       Physical Exam:  General: No Respiratory distress, appears well developed and well nourished. Eyes:  Sclera nonicteric periorbital edema  Nose/Sinuses:  negative findings: nose shows no deformity, asymmetry, or inflammation, nasal mucosa normal, septum midline with no perforation or bleeding  Back:  no pain to palpation  Joint:  no active joint inflammation  Musculoskeletal:  negative  Skin:  Warm and dry seaborric dermatitis on neck and face face skin red  Neck:  Negative for JVD and Carotid Bruits. Chest:  Clear to auscultation, respiration easy  Cardiovascular:  RRR, S1S2 normal, no murmur, no rub or thrill. Extremities:   No edema, clubbing, cyanosis,  Pulses: pedal pulses are normal.  Neuro: intact    Medications:   Outpatient Encounter Medications as of 12/3/2021   Medication Sig Dispense Refill    clonazePAM (KLONOPIN) 1 MG tablet Take 2 tablets by mouth nightly as needed (sleep) for up to 90 days. 60 tablet 2    ALPRAZolam (XANAX) 2 MG tablet Take 1 tablet by mouth 2 times daily as needed for Anxiety for up to 90 days. 45 tablet 2    azelastine (ASTELIN) 0.1 % nasal spray 1SPRAY TWO SPRAYS IN EACH NOSTRIL TWICE DAILY 1 each 3    fluticasone (FLONASE) 50 MCG/ACT nasal spray 1SPRAY 2 SPRAYS IN EACH NOSTRIL DAILY 1 each 2    triamcinolone (KENALOG) 0.1 % cream Apply topically 2 times daily. 80 g 0    ketoconazole (NIZORAL) 2 % shampoo Wash as needed 120 mL 11    metroNIDAZOLE (METROCREAM) 0.75 % cream Apply topically 2 times daily.  60 g 11    dexamethasone 0.5 MG/5ML elixir Take by mouth daily      Multiple Vitamin (DAILY-SHANNON) TABS TAKE ONE TABLET BY MOUTH DAILY 30 tablet 1    Testosterone (AXIRON) 30 MG/ACT SOLN Place 90 mg onto the skin daily 2 Bottle 3    Naloxegol Oxalate (MOVANTIK PO) Take by mouth      oxymorphone (OPANA ER) 40 MG ER tablet Take 40 mg by mouth 4 times daily       polyethylene glycol (GLYCOLAX) packet Take 17 g by mouth daily as needed.  Amylase-Lipase-Protease (CREON 20 PO) Take 20,000 mg by mouth three times daily       omeprazole (PRILOSEC) 20 MG capsule Take 20 mg by mouth Daily       oxycodone (OXY-IR) 30 MG immediate release tablet Take 30 mg by mouth 4 times daily       [DISCONTINUED] lisinopril (PRINIVIL;ZESTRIL) 20 MG tablet TAKE ONE TABLET BY MOUTH DAILY (Patient not taking: Reported on 12/3/2021) 90 tablet 1     No facility-administered encounter medications on file as of 12/3/2021. Lab Data:  CBC: No results for input(s): WBC, HGB, HCT, MCV, PLT in the last 72 hours. BMP: No results for input(s): NA, K, CL, CO2, PHOS, BUN, CREATININE in the last 72 hours. Invalid input(s): CA  LIVER PROFILE: No results for input(s): AST, ALT, LIPASE, BILIDIR, BILITOT, ALKPHOS in the last 72 hours. Invalid input(s): AMYLASE,  ALB  LIPID:   Lab Results   Component Value Date    CHOL 177 08/21/2018    CHOL 169 05/23/2016    CHOL 165 01/14/2016     Lab Results   Component Value Date    TRIG 171 08/21/2018    TRIG 146 05/23/2016    TRIG 160 (H) 01/14/2016     Lab Results   Component Value Date    HDL 24 (A) 08/21/2018    HDL 27 (L) 07/21/2017    HDL 30 (L) 05/23/2016     Lab Results   Component Value Date    LDLCALC 119 08/21/2018    LDLCALC 130 (H) 07/21/2017    LDLCALC 110 (H) 05/23/2016    LDLCHOLESTEROL 128 10/24/2014     Lab Results   Component Value Date    LABVLDL 27 07/21/2017    LABVLDL 29 05/23/2016    LABVLDL 32 01/14/2016    VLDL 34 08/21/2018     Lab Results   Component Value Date    CHOLHDLRATIO 7.4 08/21/2018    CHOLHDLRATIO 5.7 (H) 05/25/2010     PT/INR: No results for input(s): PROTIME, INR in the last 72 hours. A1C:   Lab Results   Component Value Date    LABA1C 5.4 05/23/2016     BNP:  No results for input(s): BNP in the last 72 hours.     IMAGING:  I have reviewed the following tests and documented in this encounter as follows:   Discussed with patient  CXR 10/23/20  Mild elevation of right hemidiaphragm mild airspace disease RML likely atelectasis    Graded exercise stress test 2/5/20 ([address])         CXR 2/4/20      US AAA 10/30/19   No evidence of abdominal aortic aneurysm. EKG 8/8/18  NSR normal  ECHO 7/26/18  Summary   Normal LV systolic function with an estimated EF of 55%.   There is mild concentric left ventricular hypertrophy.   No regional wall motion abnormalities are seen.   Normal left ventricular diastolic filling pressure.   Mild mitral, aortic, and tricuspid regurgitation.     Carotid US 5/29/18  1. There is no gross plaque or stenosis in the internal carotid arteries  bilaterally. 2. The vertebral arteries are patent with antegrade flow bilaterally    Abdominal US 7/21/17  FINDINGS: Aorta:   Abdominal aorta is normal in caliber and not significantly changed from the prior study. Proximal aorta measures 1.6 x 1.3 cm. Mid aorta measures 1.6 x 1.3 cm. Distal aorta measure 1.2 x 1.4 cm. Vascular and Doppler interrogation is unremarkable. Iliacs:   Iliac arteries are patent and normal in caliber. ECHO 6/23/17  Normal left ventricular systolic function with an estimated ejection   fraction of 55%.   Normal left ventricular diastolic filling pressure.   The right ventricle is mildly enlarged.   The right atrium is mildly dilated.   Mild mitral regurgitation.   Mild aortic regurgitation.   Systolic pulmonary artery pressure (SPAP) is normal and estimated at 28 mmHg   (RA pressure 3 mmHg). ECHO 4/9/15  Summary  Normal left ventricle size and systolic function with an estimated   Ejection fraction of 55%. No regional wall motion abnormalities are seen. Mild  concentric LVH. Grade I diastolic function ( IVRT 930) with impaired relaxation. Mitral valve is structurally normal.  Mitral valve leaflets appear to open adequately. Mild mitral regurgitation is present. The aortic valve is normal in structure and function.  There is no  significant aortic regurgitation. The aortic root is at the upper limit of normal in size. The right atrium is slightly enlarged in size. Abdominal Aorta ultrasound 4/9/15  Findings: The aorta is normal in caliber. No significant atherosclerotic plaque or turbulent flow is identified. Measurements are as follows:   Proximal 2.2 x 1.5 cm Mid 1.5 x 1.4 cm Distal 1.4 x 1.2 cm Right common iliac artery 1.3 x 0.7 cm Left common iliac artery 1.3 x 1.0 cm    CT abdomen 11/22/2013  Aorta is   normal in caliber . There is a focal dissection of distal   abdominal aorta just above the bifurcation. Apparently this is   known to the patient and his physician. There is no evidence of   aneurysmal dilatation. Assessment:  Encounter Diagnoses   Name Primary?  Medication management     Abnormality of abdominal aorta     Dissection of abdominal aorta (HCC) Yes    Primary hypertension     Mixed hyperlipidemia      Fatigue likely medication effect. Opiates and benzo. Avni Olivo was seen today for 1 year follow up, hypertension, hyperlipidemia, results, palpitations and edema. Diagnoses and all orders for this visit:  Anxiety  Mixed hyperlipidemia   on 8.21.18 just with diet    Localized Dissection of abdominal aorta (HCC)      Last lipids 8/21/18 see above     José Luis face may be due to polycythemia due to testosterone supplementation     Plan:  1. Blood work ordered today- CBC, CMP, and fasting lipids. If cholesterol high will recommend statins. 2. Continue to stay off of lisinopril, check your BP at home. 3. Abdominal ultrasound for AAA  4. Follow up in 6 months. Your provider has ordered testing for further evaluation. An order/prescription has been included in your paper work.  To schedule outpatient testing, contact Central Scheduling by calling [address] [place] [address] ([phone]). QUALITY MEASURES  1. Tobacco Cessation Counseling: NA  2. Retake of BP if >140/90:   NA  3.  Documentation to PCP/referring for new patient:  Sent to PCP at close of office visit  4. CAD patient on anti-platelet: NA  5. CAD patient on STATIN therapy:  NA  6. Patient with CHF and aFib on anticoagulation:  NA        I, Naseem Engel RN, am scribing for and in the presence of Dr. Fouzia Inman. Naseem Engel RN    I, Dr. Fouzia Inman, personally performed the services described in this documentation, as scribed by the above signed scribe in my presence. It is both accurate and complete to my knowledge. I agree with the details independently gathered by the clinical support staff, while the remaining scribed note accurately describes my personal service to the patient.         Celine Linda MD.

## 2021-12-03 NOTE — LETTER
Tami Eves  1970    [address] Office Note  12/3/2021     Subjective:  Mr. Castro Card is here for cardiology follow up of HBP abdominal aortic localized dissection but no aneurysm. Hyperlipidemia, he is concerned about his weight being up compared to home weight with little clothes on and no shoes. He is no longer taking BP meds for last three weeks as his BP was low. [place]: Today, 12/3/2021, he states that he is doing ok. He states that his BP has been running ok at home. He reports that he has some anxiety that he deals with. He states that he recently switched endocrinologists. He has been taking fish oil for his cholesterol. He states that he is taking his medications as prescribed. Patient denies current edema, chest pain, sob, palpitations, dizziness or syncope. He took both the Claudeen Fermo vaccine     PMH of possible aortic dissection and hypertension. He had previously been seen by Dr Jayde Oconnell. He reports undergoing cardiology evaluation for chest pain and testing was wnl and felt anxiety/sytress related. CT scan of chest was obtained. He reports being diagnosed in January 2015 with pituitary tumor, extensive testing at [facility] and currently being watched for next 6 months. He was diagnosed with acromegaly. He reports chronic HA's which increase with blood pressure. He was prescribed Nadolol which has helped HA;s. His abdominal US from 10/2019 showed no aneurysm. He's concerned he has aortic aneurysm and reports Dr. Naomi Araya states last CT scan was 2009 and needs to be followed. Most recent US for AAA 10/30/19 No evidence of abdominal aortic aneurysm. admitted at [facility] Feb. 2020  for chest pain and tingling. His pain started in 2/3/20. The pain began in the morning in on the right side of his chest. This progressed throughout the day to the point that he had difficulty breathing. He attempted to raise his arms up to attempt to get more oxygen.  This movement (86.6 kg)   10/08/21 185 lb 9.6 oz (84.2 kg)   04/27/21 190 lb 9.6 oz (86.5 kg)       Physical Exam:  General: No Respiratory distress, appears well developed and well nourished. Eyes:  Sclera nonicteric periorbital edema  Nose/Sinuses:  negative findings: nose shows no deformity, asymmetry, or inflammation, nasal mucosa normal, septum midline with no perforation or bleeding  Back:  no pain to palpation  Joint:  no active joint inflammation  Musculoskeletal:  negative  Skin:  Warm and dry seaborric dermatitis on neck and face face skin red  Neck:  Negative for JVD and Carotid Bruits. Chest:  Clear to auscultation, respiration easy  Cardiovascular:  RRR, S1S2 normal, no murmur, no rub or thrill. Extremities:   No edema, clubbing, cyanosis,  Pulses: pedal pulses are normal.  Neuro: intact    Medications:   Outpatient Encounter Medications as of 12/3/2021   Medication Sig Dispense Refill    clonazePAM (KLONOPIN) 1 MG tablet Take 2 tablets by mouth nightly as needed (sleep) for up to 90 days. 60 tablet 2    ALPRAZolam (XANAX) 2 MG tablet Take 1 tablet by mouth 2 times daily as needed for Anxiety for up to 90 days. 45 tablet 2    azelastine (ASTELIN) 0.1 % nasal spray 1SPRAY TWO SPRAYS IN EACH NOSTRIL TWICE DAILY 1 each 3    fluticasone (FLONASE) 50 MCG/ACT nasal spray 1SPRAY 2 SPRAYS IN EACH NOSTRIL DAILY 1 each 2    triamcinolone (KENALOG) 0.1 % cream Apply topically 2 times daily. 80 g 0    ketoconazole (NIZORAL) 2 % shampoo Wash as needed 120 mL 11    metroNIDAZOLE (METROCREAM) 0.75 % cream Apply topically 2 times daily.  60 g 11    dexamethasone 0.5 MG/5ML elixir Take by mouth daily      Multiple Vitamin (DAILY-SHANNON) TABS TAKE ONE TABLET BY MOUTH DAILY 30 tablet 1    Testosterone (AXIRON) 30 MG/ACT SOLN Place 90 mg onto the skin daily 2 Bottle 3    Naloxegol Oxalate (MOVANTIK PO) Take by mouth      oxymorphone (OPANA ER) 40 MG ER tablet Take 40 mg by mouth 4 times daily       polyethylene glycol [facility]) packet Take 17 g by mouth daily as needed.  Amylase-Lipase-Protease (CREON 20 PO) Take 20,000 mg by mouth three times daily       omeprazole (PRILOSEC) 20 MG capsule Take 20 mg by mouth Daily       oxycodone (OXY-IR) 30 MG immediate release tablet Take 30 mg by mouth 4 times daily       [DISCONTINUED] lisinopril (PRINIVIL;ZESTRIL) 20 MG tablet TAKE ONE TABLET BY MOUTH DAILY (Patient not taking: Reported on 12/3/2021) 90 tablet 1     No facility-administered encounter medications on file as of 12/3/2021. Lab Data:  CBC: No results for input(s): WBC, HGB, HCT, MCV, PLT in the last 72 hours. BMP: No results for input(s): NA, K, CL, CO2, PHOS, BUN, CREATININE in the last 72 hours. Invalid input(s): CA  LIVER PROFILE: No results for input(s): AST, ALT, LIPASE, BILIDIR, BILITOT, ALKPHOS in the last 72 hours. Invalid input(s): AMYLASE,  ALB  LIPID:   Lab Results   Component Value Date    CHOL 177 08/21/2018    CHOL 169 05/23/2016    CHOL 165 01/14/2016     Lab Results   Component Value Date    TRIG 171 08/21/2018    TRIG 146 05/23/2016    TRIG 160 (H) 01/14/2016     Lab Results   Component Value Date    HDL 24 (A) 08/21/2018    HDL 27 (L) 07/21/2017    HDL 30 (L) 05/23/2016     Lab Results   Component Value Date    LDLCALC 119 08/21/2018    LDLCALC 130 (H) 07/21/2017    LDLCALC 110 (H) 05/23/2016    LDLCHOLESTEROL 128 10/24/2014     Lab Results   Component Value Date    LABVLDL 27 07/21/2017    LABVLDL 29 05/23/2016    LABVLDL 32 01/14/2016    VLDL 34 08/21/2018     Lab Results   Component Value Date    CHOLHDLRATIO 7.4 08/21/2018    CHOLHDLRATIO 5.7 (H) 05/25/2010     PT/INR: No results for input(s): PROTIME, INR in the last 72 hours. A1C:   Lab Results   Component Value Date    LABA1C 5.4 05/23/2016     BNP:  No results for input(s): BNP in the last 72 hours.     IMAGING:  I have reviewed the following tests and documented in this encounter as follows:   Discussed with patient  CXR 10/23/20  Mild elevation of right hemidiaphragm mild airspace disease RML likely atelectasis    [facility]20 [facility] [address]         CXR 2/4/20      US AAA 10/30/19   No evidence of abdominal aortic aneurysm. EKG 8/8/18  NSR normal  ECHO 7/26/18  Summary   Normal LV systolic function with an estimated EF of 55%.   There is mild concentric left ventricular hypertrophy.   No regional wall motion abnormalities are seen.   Normal left ventricular diastolic filling pressure.   Mild mitral, aortic, and tricuspid regurgitation.     Carotid US 5/29/18  1. There is no gross plaque or stenosis in the internal carotid arteries  bilaterally. 2. The vertebral arteries are patent with antegrade flow bilaterally    Abdominal US 7/21/17  FINDINGS: Aorta:   Abdominal aorta is normal in caliber and not significantly changed from the prior study. Proximal aorta measures 1.6 x 1.3 cm. Mid aorta measures 1.6 x 1.3 cm. Distal aorta measure 1.2 x 1.4 cm. Vascular and Doppler interrogation is unremarkable. Iliacs:   Iliac arteries are patent and normal in caliber. ECHO 6/23/17  Normal left ventricular systolic function with an estimated ejection   fraction of 55%.   Normal left ventricular diastolic filling pressure.   The right ventricle is mildly enlarged.   The right atrium is mildly dilated.   Mild mitral regurgitation.   Mild aortic regurgitation.   Systolic pulmonary artery pressure (SPAP) is normal and estimated at 28 mmHg   (RA pressure 3 mmHg). ECHO 4/9/15  Summary  Normal left ventricle size and systolic function with an estimated   Ejection fraction of 55%. No regional wall motion abnormalities are seen. Mild  concentric LVH. Grade I diastolic function ( IVRT 878) with impaired relaxation. Mitral valve is structurally normal.  Mitral valve leaflets appear to open adequately. Mild mitral regurgitation is present.   The aortic valve is normal in structure and function. There is no  significant aortic regurgitation. The aortic root is at the upper limit of normal in size. The right atrium is slightly enlarged in size. Abdominal Aorta ultrasound 4/9/15  Findings: The aorta is normal in caliber. No significant atherosclerotic plaque or turbulent flow is identified. Measurements are as follows:   Proximal 2.2 x 1.5 cm Mid 1.5 x 1.4 cm Distal 1.4 x 1.2 cm Right common iliac artery 1.3 x 0.7 cm Left common iliac artery 1.3 x 1.0 cm    CT abdomen 11/22/2013  Aorta is   normal in caliber . There is a focal dissection of distal   abdominal aorta just above the bifurcation. Apparently this is   known to the patient and his physician. There is no evidence of   aneurysmal dilatation. Assessment:  Encounter Diagnoses   Name Primary?  Medication management     Abnormality of abdominal aorta     Dissection of abdominal aorta (HCC) Yes    Primary hypertension     Mixed hyperlipidemia      Fatigue likely medication effect. Opiates and benzo. Gayathri Cabral was seen today for 1 year follow up, hypertension, hyperlipidemia, results, palpitations and edema. Diagnoses and all orders for this visit:  Anxiety  Mixed hyperlipidemia   on 8.21.18 just with diet    Localized Dissection of abdominal aorta (HCC)      Last lipids 8/21/18 see above     José Luis face may be due to polycythemia due to testosterone supplementation     Plan:  1. Blood work ordered today- CBC, CMP, and fasting lipids. If cholesterol high will recommend statins. 2. Continue to stay off of lisinopril, check your BP at home. 3. Abdominal ultrasound for AAA  4. Follow up in 6 months. Your provider has ordered testing for further evaluation. An order/prescription has been included in your paper work.  To schedule outpatient testing, contact Central Scheduling by calling [address] [place] [address] ([phone]). QUALITY MEASURES  1. Tobacco Cessation Counseling: NA  2. Retake of BP if >140/90:   NA  3.  Documentation to PCP/referring for new patient:  Sent to PCP at close of office visit  4. CAD patient on anti-platelet: NA  5. CAD patient on STATIN therapy:  NA  6. Patient with CHF and aFib on anticoagulation:  NA        I, Gely Silva RN, am scribing for and in the presence of Dr. Lauro Castro. Gely Silva RN    I, Dr. Lauro Castro, personally performed the services described in this documentation, as scribed by the above signed scribe in my presence. It is both accurate and complete to my knowledge. I agree with the details independently gathered by the clinical support staff, while the remaining scribed note accurately describes my personal service to the patient.         Claudette Finch, MD.

## 2021-12-14 ENCOUNTER — OFFICE VISIT (OUTPATIENT)
Dept: ENDOCRINOLOGY | Age: 51
End: 2021-12-14
Payer: COMMERCIAL

## 2021-12-14 VITALS
WEIGHT: 182 LBS | TEMPERATURE: 98 F | OXYGEN SATURATION: 98 % | RESPIRATION RATE: 14 BRPM | SYSTOLIC BLOOD PRESSURE: 118 MMHG | HEIGHT: 69 IN | BODY MASS INDEX: 26.96 KG/M2 | DIASTOLIC BLOOD PRESSURE: 70 MMHG | HEART RATE: 75 BPM

## 2021-12-14 DIAGNOSIS — R53.83 OTHER FATIGUE: ICD-10-CM

## 2021-12-14 DIAGNOSIS — Z86.39 HISTORY OF ADRENAL INSUFFICIENCY: ICD-10-CM

## 2021-12-14 DIAGNOSIS — E55.9 VITAMIN D DEFICIENCY: ICD-10-CM

## 2021-12-14 DIAGNOSIS — E23.0 HYPOGONADOTROPIC HYPOGONADISM (HCC): Primary | ICD-10-CM

## 2021-12-14 DIAGNOSIS — D35.2 PITUITARY MICROADENOMA (HCC): ICD-10-CM

## 2021-12-14 DIAGNOSIS — R79.89 ELEVATED INSULIN-LIKE GROWTH FACTOR 1 (IGF-1) LEVEL: ICD-10-CM

## 2021-12-14 DIAGNOSIS — E78.49 OTHER HYPERLIPIDEMIA: ICD-10-CM

## 2021-12-14 PROCEDURE — G8482 FLU IMMUNIZE ORDER/ADMIN: HCPCS | Performed by: INTERNAL MEDICINE

## 2021-12-14 PROCEDURE — 1036F TOBACCO NON-USER: CPT | Performed by: INTERNAL MEDICINE

## 2021-12-14 PROCEDURE — 99205 OFFICE O/P NEW HI 60 MIN: CPT | Performed by: INTERNAL MEDICINE

## 2021-12-14 PROCEDURE — G8417 CALC BMI ABV UP PARAM F/U: HCPCS | Performed by: INTERNAL MEDICINE

## 2021-12-14 PROCEDURE — 3017F COLORECTAL CA SCREEN DOC REV: CPT | Performed by: INTERNAL MEDICINE

## 2021-12-14 PROCEDURE — G8427 DOCREV CUR MEDS BY ELIG CLIN: HCPCS | Performed by: INTERNAL MEDICINE

## 2021-12-14 RX ORDER — ERGOCALCIFEROL 1.25 MG/1
50000 CAPSULE ORAL WEEKLY
Qty: 12 CAPSULE | Refills: 1 | Status: SHIPPED | OUTPATIENT
Start: 2021-12-14 | End: 2022-05-09 | Stop reason: SDUPTHER

## 2021-12-14 RX ORDER — OMEGA-3/DHA/EPA/FISH OIL 300-1000MG
2 CAPSULE ORAL 2 TIMES DAILY
Qty: 360 CAPSULE | Refills: 1 | Status: SHIPPED
Start: 2021-12-14 | End: 2021-12-15 | Stop reason: SDUPTHER

## 2021-12-14 RX ORDER — TESTOSTERONE 30 MG/1.5ML
90 SOLUTION TOPICAL DAILY
Qty: 3 EACH | Refills: 3 | Status: SHIPPED | OUTPATIENT
Start: 2021-12-14 | End: 2022-02-16

## 2021-12-14 NOTE — PROGRESS NOTES
SUBJECTIVE:  Reagan Buck is a 46 y.o. male who is being evaluated for hypogonadism. 1. Hypogonadotropic hypogonadism ([address]  This started in 2006. Patient was diagnosed with hypogonadism. The problem has been unchanged. Patient started medication in 2006. Currently patient is on: Nonnie Morgans. Misses  0 doses a month. In 2006 started hot flashes. Had Androgel, injections before. On Axiron 3 applications daily. 2 years on it. Sean Bailey worked the best.  No decreased libido or ED on Nonnie Morgans    Was on [facility] replacement therapy. Not on it now. 2. Elevated insulin-like growth factor 1 (IGF-1) level  Elevated IGF-1  Had [facility] suppression test  Was found abnormal, he was prescribed medication, but patient did not take it initially. Later he took medication. Has hand swelling  Shoe size increased 0.5 size  Ring size increased  Had carpal tunnel surgery  Had dental changes, gaps    3. History of adrenal insufficiency he is not  On dexamethasone small dose, then weaned off. In 2012 stopped medication  Was in [facility] for consultation  Not on medication since then. Later on dexamethasone 0.5 mg daily. Run out. He is not on dexamethasone for few months. No problems 1 of dexamethasone. No hypotension, nausea, vomiting    Current complaints: fatigue, muscle weakness, loss of eyebrows. Fatigue was severe. 4. Pituitary microadenoma ([address]  In 2012 Dx with pituitary microadenoma  In 2010 had severe headaches, migraines, Dx with cluster headaches. Has Hx of pancreatitis due to gallstones. Has ongoing pancreas issues since then  Sees Dr. Hina Oliveira. History of obstructive symptoms: difficulty swallowing No, changes in voice/hoarseness No.  History of radiation to patient's neck: No  Resent iodine exposure: No  Family history includes no thyroid abnormalities. Family history of thyroid cancer: No    Gained weight, then lost some when stopped steroids. Eats healthy.     5. Other insulin-like growth factor 1 (IGF-1) level     Hypogonadism male     IFG (impaired fasting glucose)     Insomnia     Low serum cortisol level (HCC)     Neuropathic pain     Pancreatitis     Pituitary adenoma (HCC)     Secondary adrenal insufficiency (HCC)      Patient Active Problem List    Diagnosis Date Noted    Hypogonadotropic hypogonadism ([facility] 12/14/2021    Pituitary microadenoma ([address] 12/14/2021    History of adrenal insufficiency 12/14/2021    Other hyperlipidemia 12/14/2021    Pleurisy 02/19/2020    Precordial pain 02/11/2020    Cervicalgia 10/26/2017    Primary osteoarthritis of right shoulder 10/19/2017    Abnormal thyroid scan 02/04/2017    Pituitary abnormality ([address] 07/05/2016    Aortic root dilatation (HCC) 06/01/2016    Seborrheic dermatitis 05/17/2016    Dissection of abdominal aorta (HCC) 02/25/2015    Hyperlipidemia 02/25/2015    Cubital tunnel syndrome 12/03/2014    ADHD (attention deficit hyperactivity disorder) 11/12/2014    Elevated insulin-like growth factor 1 (IGF-1) level 10/16/2014    Carpal tunnel syndrome 08/15/2014    Secondary adrenal insufficiency (HCC) 12/13/2013    Benign hypertension 11/06/2013    Chronic pain syndrome     Neuropathic pain     Insomnia     Depression     Environmental allergies 09/11/2013    Vitamin D deficiency 06/05/2013    Chronic pancreatitis ([facility] 01/02/2013    Other fatigue 01/02/2013    Hypogonadism male 11/16/2012    Migraines 07/06/2012    Generalized headaches 05/27/2011    Anxiety      Past Surgical History:   Procedure Laterality Date    CHOLECYSTECTOMY      ERCP       Family History   Problem Relation Age of Onset    High Blood Pressure Mother     Diabetes Neg Hx      Social History     Socioeconomic History    Marital status:      Spouse name: None    Number of children: None    Years of education: None    Highest education level: None   Occupational History    None   Tobacco Use    Smoking status: Never Smoker    Smokeless tobacco: Never Used   Vaping Use    Vaping Use: Never used   Substance and Sexual Activity    Alcohol use: No    Drug use: No    Sexual activity: Yes     Partners: Female     Comment: ; 3 children   Other Topics Concern    None   Social History Narrative    None     Social Determinants of Health     Financial Resource Strain: Low Risk     Difficulty of Paying Living Expenses: Not hard at all   Food Insecurity: No Food Insecurity    Worried About Running Out of Food in the Last Year: Never true    [address] in the Last Year: Never true   Transportation Needs: Unknown    Lack of Transportation (Medical): No    Lack of Transportation (Non-Medical): Not asked   Physical Activity:     Days of Exercise per Week: Not on file    Minutes of Exercise per Session: Not on file   Stress:     Feeling of Stress : Not on file   Social Connections:     Frequency of Communication with Friends and Family: Not on file    Frequency of Social Gatherings with Friends and Family: Not on file    Attends [employer] Services: Not on file    Active Member of [address] [place] [address] or Organizations: Not on file    Attends Club or Organization Meetings: Not on file    Marital Status: Not on file   Intimate Partner Violence:     Fear of Current or Ex-Partner: Not on file    Emotionally Abused: Not on file    Physically Abused: Not on file    Sexually Abused: Not on file   Housing Stability:     Unable to Pay for Housing in the Last Year: Not on file    Number of [place] in the Last Year: Not on file    Unstable Housing in the Last Year: Not on file     Current Outpatient Medications   Medication Sig Dispense Refill    Testosterone (AXIRON) 30 MG/ACT SOLN Place 90 mg onto the skin daily for 30 days.  3 each 3    vitamin D (ERGOCALCIFEROL) 1.25 MG (59691 UT) CAPS capsule Take 1 capsule by mouth once a week 12 capsule 1    clonazePAM (KLONOPIN) 1 MG tablet Take 2 tablets by mouth nightly as needed (sleep) for up to 90 days. 60 tablet 2    ALPRAZolam (XANAX) 2 MG tablet Take 1 tablet by mouth 2 times daily as needed for Anxiety for up to 90 days. 45 tablet 2    azelastine (ASTELIN) 0.1 % nasal spray 1SPRAY TWO SPRAYS IN EACH NOSTRIL TWICE DAILY 1 each 3    fluticasone (FLONASE) 50 MCG/ACT nasal spray 1SPRAY 2 SPRAYS IN EACH NOSTRIL DAILY 1 each 2    triamcinolone (KENALOG) 0.1 % cream Apply topically 2 times daily. 80 g 0    ketoconazole (NIZORAL) 2 % shampoo Wash as needed 120 mL 11    metroNIDAZOLE (METROCREAM) 0.75 % cream Apply topically 2 times daily. 60 g 11    Multiple Vitamin (DAILY-SHANNON) TABS TAKE ONE TABLET BY MOUTH DAILY 30 tablet 1    Naloxegol Oxalate (MOVANTIK PO) Take by mouth      oxymorphone (OPANA ER) 40 MG ER tablet Take 40 mg by mouth 4 times daily       polyethylene glycol (GLYCOLAX) packet Take 17 g by mouth daily as needed.  Amylase-Lipase-Protease (CREON 20 PO) Take 20,000 mg by mouth three times daily       omeprazole (PRILOSEC) 20 MG capsule Take 20 mg by mouth Daily       oxycodone (OXY-IR) 30 MG immediate release tablet Take 30 mg by mouth 4 times daily       omega-3 acid ethyl esters (LOVAZA) 1 g capsule Take 2 capsules by mouth 2 times daily 360 capsule 1     No current facility-administered medications for this visit.      Allergies   Allergen Reactions    Ambien [Zolpidem] Other (See Comments)     Blackout lists    Augmentin [Amoxicillin-Pot Clavulanate] Other (See Comments)     diarrhea    Elavil [Amitriptyline] Nausea Only and Other (See Comments)     JITTERY FEELING    Neurontin [Gabapentin] Nausea Only    Topamax Nausea Only    Tramadol Other (See Comments)     Patient has been on high dose pain medincines since 2002 so tramadol doesn't help with pain at all     Family Status   Relation Name Status    Mother 61 Alive        HTN    Father 64 Alive    Other  (Not Specified)        No DM, DVT in family    Neg Hx  (Not Specified) Review of Systems:  Constitutional: has fatigue, no fever, no recent weight gain, no recent weight loss, no changes in appetite  Eyes: no eye pain, no change in vision, no eye redness, no eye irritation, no double vision  Ears, nose, throat: has nasal congestion, no sore throat, no earache, no decrease in hearing, no hoarseness, no dry mouth, has sinus problems, no difficulty swallowing, no neck lumps, no dental problems, no mouth sores, no ringing in ears  Pulmonary: no shortness of breath, no wheezing, no dyspnea on exertion, no cough  Cardiovascular: no chest pain, no lower extremity edema, no orthopnea, no intermittent leg claudication, no palpitations  Gastrointestinal: has abdominal pain, no nausea, no vomiting, has diarrhea, has constipation, no dysphagia, has heartburn, no bloating  Genitourinary: no dysuria, no urinary incontinence, no urinary hesitancy, no urinary frequency, no feelings of urinary urgency, no nocturia  Musculoskeletal: no joint swelling, no joint stiffness, has joint pain, no muscle cramps, no muscle pain  Integument/Breast: no skin rashes, no skin lesions, no itching, has dry skin  Neurological: no numbness, no tingling, has weakness, no confusion, no headaches, no dizziness, no fainting, no tremors, no decrease in memory, no balance problems  Psychiatric: has anxiety, no depression, has insomnia  Hematologic/Lymphatic: no tendency for easy bleeding, no swollen lymph nodes, no tendency for easy bruising  Immunology: has seasonal allergies, no frequent infections, no frequent illnesses  Endocrine: has temperature intolerance    /70   Pulse 75   Temp 98 °F (36.7 °C)   Resp 14   Ht 5' 9\" (1.753 m)   Wt 182 lb (82.6 kg)   SpO2 98%   BMI 26.88 kg/m²    Wt Readings from Last 3 Encounters:   12/14/21 182 lb (82.6 kg)   12/03/21 191 lb (86.6 kg)   10/08/21 185 lb 9.6 oz (84.2 kg)     Body mass index is 26.88 kg/m².     OBJECTIVE:  Constitutional: no acute distress, well appearing and well nourished  Psychiatric: oriented to person, place and time, judgement and insight and normal, recent and remote memory and intact and mood and affect are normal  Skin: skin and subcutaneous tissue is normal without mass, normal turgor  Head and Face: examination of head and face revealed no abnormalities  Eyes: no lid or conjunctival swelling, erythema or discharge, pupils are normal, equal, round, reactive to light  Ears/Nose: external inspection of ears and nose revealed no abnormalities, hearing is grossly normal  Oropharynx/Mouth/Face: lips, tongue and gums are normal with no lesions, the voice quality was normal  Neck: neck is supple and symmetric, with midline trachea and no masses, thyroid is normal  Lymphatics: normal cervical lymph nodes, normal supraclavicular nodes  Pulmonary: no increased work of breathing or signs of respiratory distress, lungs are clear to auscultation  Cardiovascular: normal heart rate and rhythm, normal S1 and S2, no murmurs and pedal pulses and 2+ bilaterally, No edema  Abdomen: abdomen is soft, non-tender with no masses  Musculoskeletal: normal gait and station and exam of the digits and nails are normal  Neurological: normal coordination and normal general cortical function      Lab Review:    Lab Results   Component Value Date    WBC 4.6 02/14/2018    HGB 15.1 02/14/2018    HCT 45.0 02/14/2018    MCV 84.6 02/14/2018     02/14/2018     Lab Results   Component Value Date     08/21/2018    K 5.1 08/21/2018    CL 99 08/21/2018    CO2 26 08/21/2018    BUN 12 08/21/2018    CREATININE 0.94 08/21/2018    GLUCOSE 100 08/21/2018    CALCIUM 9.6 08/21/2018    PROT 7.4 09/08/2017    PROT 6.8 03/15/2013    LABALBU 3.0 08/21/2018    BILITOT 0.8 08/21/2018    ALKPHOS 66 08/21/2018    AST 34 08/21/2018    ALT 37 08/21/2018    LABGLOM 95 08/21/2018    LABGLOM >60 05/23/2016    LABGLOM >60>60 05/25/2010    GFRAA >60 05/23/2016    GFRAA >60 03/15/2013    AGRATIO 1.8 05/23/2016    GLOB 2.4 05/23/2016     Lab Results   Component Value Date    TSHFT4 2.39 10/08/2021    TSH 1.860 08/21/2018    FT3 3.2 08/21/2018     Lab Results   Component Value Date    LABA1C 5.4 05/23/2016     Lab Results   Component Value Date    .3 05/23/2016     Lab Results   Component Value Date    CHOL 177 08/21/2018     Lab Results   Component Value Date    TRIG 171 08/21/2018     Lab Results   Component Value Date    HDL 24 08/21/2018    HDL 36 05/11/2012     Lab Results   Component Value Date    LDLCHOLESTEROL 128 10/24/2014    LDLCALC 119 08/21/2018     Lab Results   Component Value Date    LABVLDL 27 07/21/2017    VLDL 34 08/21/2018     Lab Results   Component Value Date    CHOLHDLRATIO 7.4 08/21/2018     No results found for: eNida Muhammad  Lab Results   Component Value Date    VITD25 35.8 08/21/2018        ASSESSMENT/PLAN:    1. Hypogonadotropic hypogonadism (HCC)  Continue Axiron  - Testosterone (AXIRON) 30 MG/ACT SOLN; Place 90 mg onto the skin daily for 30 days. Dispense: 3 each; Refill: 3  - Prolactin; Future  - ACTH; Future  - Cortisol AM, Total; Future  - Follicle Stimulating Hormone; Future  - Luteinizing Hormone; Future  - Insulin-Like Growth Factor; Future  - Growth Hormone; Future  - Testosterone, free, total; Future  - Comprehensive Metabolic Panel; Future  - CBC Auto Differential; Future  - DHEA-Sulfate; Future  - Lipid Panel; Future    2. Vitamin D deficiency  - Comprehensive Metabolic Panel; Future  - Vitamin D 25 Hydroxy; Future  - vitamin D (ERGOCALCIFEROL) 1.25 MG (39253 UT) CAPS capsule; Take 1 capsule by mouth once a week  Dispense: 12 capsule; Refill: 1    3. Other fatigue  - Prolactin; Future  - ACTH; Future  - Cortisol AM, Total; Future  - Follicle Stimulating Hormone; Future  - Luteinizing Hormone; Future  - Insulin-Like Growth Factor; Future  - Growth Hormone;  Future  - Testosterone, free, total; Future  - T3; Future  - T4; Future  - T4, Free; Future  - TSH without Reflex; Future  - Comprehensive Metabolic Panel; Future  - CBC Auto Differential; Future  - Anti-Thyroglobulin Antibody; Future  - Thyroid Peroxidase Antibody; Future  - DHEA-Sulfate; Future  - Lipid Panel; Future  - Comprehensive Metabolic Panel; Future  - Vitamin D 25 Hydroxy; Future    4. Elevated insulin-like growth factor 1 (IGF-1) level  - Prolactin; Future  - Insulin-Like Growth Factor; Future    5. Pituitary microadenoma (HCC)  - Prolactin; Future  - ACTH; Future  - Cortisol AM, Total; Future  - Follicle Stimulating Hormone; Future  - Luteinizing Hormone; Future  - Insulin-Like Growth Factor; Future  - Growth Hormone; Future  - Testosterone, free, total; Future  - T3; Future  - T4; Future  - T4, Free; Future  - TSH without Reflex; Future  - Comprehensive Metabolic Panel; Future  - CBC Auto Differential; Future  - Anti-Thyroglobulin Antibody; Future  - Thyroid Peroxidase Antibody; Future  - DHEA-Sulfate; Future  - Lipid Panel; Future  - Comprehensive Metabolic Panel; Future  - MRI BRAIN W WO CONTRAST; Future    6. History of adrenal insufficiency  Order Cortrosyn stim test later  - ACTH; Future  - Cortisol AM, Total; Future    7. Other hyperlipidemia  - Lipid Panel;  Future      Reviewed and/or ordered clinical lab results Yes  Reviewed and/or ordered radiology tests Yes   Reviewed and/or ordered other diagnostic tests No  Discussed test results with performing physician No  Independently reviewed image, tracing, or specimen No  Made a decision to obtain old records No  Reviewed and summarized old records Yes   TSH 2.39  IGF-1 131  Obtained history from other than patient No    Eloisa Shafer was counseled regarding symptoms of hypogonadism, pituitary disease, adrenal insufficiency, endocrine causes of fatigue diagnosis, course and complications of disease if inadequately treated, side effects of medications, diagnosis, treatment options, and prognosis, risks, benefits, complications, and alternatives of treatment, labs, imaging and other studies and treatment targets and goals. He understands instructions and counseling. Total time I spent for this encounter 60 minutes    Return in about 1 month (around 1/14/2022) for hypogonadism.     Electronically signed by Prema Preston MD on 12/16/2021 at 9:55 PM

## 2021-12-22 ENCOUNTER — TELEPHONE (OUTPATIENT)
Dept: ENDOCRINOLOGY | Age: 51
End: 2021-12-22

## 2021-12-22 NOTE — TELEPHONE ENCOUNTER
Submitted PA for Testosterone 30MG/ACT solution Key: [id number] - PA Case ID: [id number] - Rx #: [id number]    Via CMM STATUS: Approved

## 2021-12-29 ENCOUNTER — OFFICE VISIT (OUTPATIENT)
Dept: FAMILY MEDICINE CLINIC | Age: 51
End: 2021-12-29
Payer: COMMERCIAL

## 2021-12-29 VITALS
DIASTOLIC BLOOD PRESSURE: 80 MMHG | WEIGHT: 191 LBS | TEMPERATURE: 97.9 F | OXYGEN SATURATION: 98 % | SYSTOLIC BLOOD PRESSURE: 125 MMHG | HEIGHT: 70 IN | HEART RATE: 76 BPM | BODY MASS INDEX: 27.35 KG/M2

## 2021-12-29 DIAGNOSIS — F41.9 ANXIETY: ICD-10-CM

## 2021-12-29 DIAGNOSIS — F51.01 PRIMARY INSOMNIA: ICD-10-CM

## 2021-12-29 DIAGNOSIS — I10 BENIGN HYPERTENSION: ICD-10-CM

## 2021-12-29 DIAGNOSIS — R79.89 ELEVATED INSULIN-LIKE GROWTH FACTOR 1 (IGF-1) LEVEL: ICD-10-CM

## 2021-12-29 DIAGNOSIS — E78.49 OTHER HYPERLIPIDEMIA: Primary | ICD-10-CM

## 2021-12-29 DIAGNOSIS — K86.1 CHRONIC PANCREATITIS, UNSPECIFIED PANCREATITIS TYPE (HCC): ICD-10-CM

## 2021-12-29 DIAGNOSIS — E29.1 HYPOGONADISM MALE: ICD-10-CM

## 2021-12-29 DIAGNOSIS — D35.2 PITUITARY MICROADENOMA (HCC): ICD-10-CM

## 2021-12-29 DIAGNOSIS — E23.0 HYPOGONADOTROPIC HYPOGONADISM (HCC): ICD-10-CM

## 2021-12-29 DIAGNOSIS — E78.2 MIXED HYPERLIPIDEMIA: ICD-10-CM

## 2021-12-29 PROCEDURE — 1036F TOBACCO NON-USER: CPT | Performed by: NURSE PRACTITIONER

## 2021-12-29 PROCEDURE — 99214 OFFICE O/P EST MOD 30 MIN: CPT | Performed by: NURSE PRACTITIONER

## 2021-12-29 PROCEDURE — 3017F COLORECTAL CA SCREEN DOC REV: CPT | Performed by: NURSE PRACTITIONER

## 2021-12-29 PROCEDURE — G8427 DOCREV CUR MEDS BY ELIG CLIN: HCPCS | Performed by: NURSE PRACTITIONER

## 2021-12-29 PROCEDURE — G8482 FLU IMMUNIZE ORDER/ADMIN: HCPCS | Performed by: NURSE PRACTITIONER

## 2021-12-29 PROCEDURE — G8417 CALC BMI ABV UP PARAM F/U: HCPCS | Performed by: NURSE PRACTITIONER

## 2021-12-29 RX ORDER — FLUTICASONE PROPIONATE 50 MCG
SPRAY, SUSPENSION (ML) NASAL
Qty: 1 EACH | Refills: 2 | Status: SHIPPED | OUTPATIENT
Start: 2021-12-29 | End: 2022-07-12

## 2021-12-29 ASSESSMENT — ENCOUNTER SYMPTOMS
STRIDOR: 0
COUGH: 0
ABDOMINAL PAIN: 0
DIARRHEA: 0
EYE ITCHING: 0
SORE THROAT: 0
TROUBLE SWALLOWING: 0
RHINORRHEA: 0
COLOR CHANGE: 0
PHOTOPHOBIA: 0
BACK PAIN: 0
VOICE CHANGE: 0
SINUS PRESSURE: 0
VOMITING: 0
BLOOD IN STOOL: 0
CONSTIPATION: 0
CHOKING: 0
CHEST TIGHTNESS: 0
EYE REDNESS: 0
SINUS PAIN: 0
NAUSEA: 0
SHORTNESS OF BREATH: 0
EYE DISCHARGE: 0
EYE PAIN: 0
WHEEZING: 0

## 2021-12-29 NOTE — PROGRESS NOTES
growth factor I level and history of adrenal insufficiency and pituitary microadenoma. Endocrine ordered an extensive amount of labs to be done on the patient including lipid panel, CMP, vitamin D, thyroid labs and CBC. All these are needed for primary care as well so we will see if they can send the us a copy. He agreed to have his labs sent. ROS:  Review of Systems   Constitutional: Negative for activity change, appetite change, chills, diaphoresis, fatigue, fever and unexpected weight change. HENT: Negative for congestion, ear discharge, ear pain, hearing loss, nosebleeds, postnasal drip, rhinorrhea, sinus pressure, sinus pain, sneezing, sore throat, tinnitus, trouble swallowing and voice change. Eyes: Negative for photophobia, pain, discharge, redness and itching. Respiratory: Negative for cough, choking, chest tightness, shortness of breath, wheezing and stridor. Cardiovascular: Negative for chest pain, palpitations and leg swelling. Gastrointestinal: Negative for abdominal pain, blood in stool, constipation, diarrhea, nausea and vomiting. Endocrine: Negative for cold intolerance, heat intolerance, polydipsia and polyuria. Genitourinary: Negative for difficulty urinating, dysuria, enuresis, flank pain, frequency, hematuria and urgency. Musculoskeletal: Negative for back pain, gait problem, joint swelling, neck pain and neck stiffness. Skin: Negative for color change, pallor, rash and wound. Allergic/Immunologic: Negative for environmental allergies and food allergies. Neurological: Negative for dizziness, tremors, syncope, speech difficulty, weakness, light-headedness, numbness and headaches. Hematological: Negative for adenopathy. Does not bruise/bleed easily. Psychiatric/Behavioral: Negative for agitation, behavioral problems, confusion, decreased concentration, dysphoric mood, hallucinations, self-injury, sleep disturbance and suicidal ideas.  The patient is not nervous/anxious and is not hyperactive. Hemoglobin A1C (%)   Date Value   05/23/2016 5.4     LDL Calculated (mg/dL)   Date Value   08/21/2018 119       Past Medical History:   Diagnosis Date    Acromegalia ([address]     Anxiety     Chronic pain syndrome     Chronic pancreatitis (HCC)     Depression     Dyslipidemia     Elevated insulin-like growth factor 1 (IGF-1) level     Hypogonadism male     IFG (impaired fasting glucose)     Insomnia     Low serum cortisol level (HCC)     Neuropathic pain     Pancreatitis     Pituitary adenoma (HCC)     Secondary adrenal insufficiency (HCC)        Family History   Problem Relation Age of Onset    High Blood Pressure Mother     Diabetes Neg Hx        Social History     Socioeconomic History    Marital status:      Spouse name: Not on file    Number of children: Not on file    Years of education: Not on file    Highest education level: Not on file   Occupational History    Not on file   Tobacco Use    Smoking status: Never Smoker    Smokeless tobacco: Never Used   Vaping Use    Vaping Use: Never used   Substance and Sexual Activity    Alcohol use: No    Drug use: No    Sexual activity: Yes     Partners: Female     Comment: ; 3 children   Other Topics Concern    Not on file   Social History Narrative    Not on file     Social Determinants of Health     Financial Resource Strain: Low Risk     Difficulty of Paying Living Expenses: Not hard at all   Food Insecurity: No Food Insecurity    Worried About Running Out of Food in the Last Year: Never true    [place] of Food in the Last Year: Never true   Transportation Needs: Unknown    Lack of Transportation (Medical): No    Lack of Transportation (Non-Medical):  Not asked   Physical Activity:     Days of Exercise per Week: Not on file    Minutes of Exercise per Session: Not on file   Stress:     Feeling of Stress : Not on file   Social Connections:     Frequency of Communication with Friends and Family: Not on file    Frequency of Social Gatherings with Friends and Family: Not on file    Attends [employer] Services: Not on file    Active Member of Clubs or Organizations: Not on file    Attends Club or Organization Meetings: Not on file    Marital Status: Not on file   Intimate Partner Violence:     Fear of Current or Ex-Partner: Not on file    Emotionally Abused: Not on file    Physically Abused: Not on file    Sexually Abused: Not on file   Housing Stability:     Unable to Pay for Housing in the Last Year: Not on file    Number of [place] in the Last Year: Not on file    Unstable Housing in the Last Year: Not on file       Prior to Visit Medications    Medication Sig Taking? Authorizing Provider   fluticasone (FLONASE) 50 MCG/ACT nasal spray 1SPRAY 2 SPRAYS IN EACH NOSTRIL DAILY Yes RENO Schultz CNP   omega-3 acid ethyl esters (LOVAZA) 1 g capsule Take 2 capsules by mouth 2 times daily Yes Juan Kirkpatrick MD   Testosterone (AXIRON) 30 MG/ACT SOLN Place 90 mg onto the skin daily for 30 days. Yes Juan Kirkpatrick MD   vitamin D (ERGOCALCIFEROL) 1.25 MG (66543 UT) CAPS capsule Take 1 capsule by mouth once a week Yes Juan Kirkpatrick MD   clonazePAM (KLONOPIN) 1 MG tablet Take 2 tablets by mouth nightly as needed (sleep) for up to 90 days. Yes Aggie Szymanski MD   ALPRAZolam Marcellina Bullion) 2 MG tablet Take 1 tablet by mouth 2 times daily as needed for Anxiety for up to 90 days. Yes Aggie Szymanski MD   azelastine (ASTELIN) 0.1 % nasal spray 1SPRAY TWO SPRAYS IN EACH NOSTRIL TWICE DAILY Yes Aggie Szymanski MD   triamcinolone (KENALOG) 0.1 % cream Apply topically 2 times daily. Yes Aggie Szymanski MD   ketoconazole (NIZORAL) 2 % shampoo Wash as needed Yes Gonzales Yang MD   metroNIDAZOLE (METROCREAM) 0.75 % cream Apply topically 2 times daily.  Yes Gonzales Yang MD   Multiple Vitamin (DAILY-SHANNON) TABS TAKE ONE TABLET BY MOUTH DAILY Yes Elsa Collins APRN - CNP Benign hypertension    -Well-controlled at this time and he follows up with cardiologist.     4. Primary insomnia    - Patient is doing well on current medical regimen and will continue current medical regimen at this time. 5. Anxiety    -Patient is doing well on current medical regimen and will continue current medical regimen at this time. 6. Pituitary microadenoma ([facility]  -Stable, getting MRI in the next few months    7. Chronic pancreatitis, unspecified pancreatitis type ([facility]  -Stable    8. Hypogonadism male  -Stable    9. Hypogonadotropic hypogonadism (HCC)  -Stable    10. Elevated insulin-like growth factor 1 (IGF-1) level  -Stable    He will need refills of his xanax and cloinazepam on 1/6/21. Advised the pharmacy should request these closer to the 6th. Return in about 3 months (around 3/29/2022) for controlled substances .

## 2022-01-18 ENCOUNTER — HOSPITAL ENCOUNTER (OUTPATIENT)
Dept: ULTRASOUND IMAGING | Age: 52
Discharge: HOME | End: 2022-01-18

## 2022-01-18 DIAGNOSIS — Q25.40 ABNORMALITY OF ABDOMINAL AORTA: ICD-10-CM

## 2022-01-25 ENCOUNTER — HOSPITAL ENCOUNTER (OUTPATIENT)
Dept: ULTRASOUND IMAGING | Age: 52
Discharge: HOME | End: 2022-01-25
Payer: COMMERCIAL

## 2022-01-25 PROCEDURE — 76775 US EXAM ABDO BACK WALL LIM: CPT

## 2022-01-28 ENCOUNTER — TELEPHONE (OUTPATIENT)
Dept: CARDIOLOGY CLINIC | Age: 52
End: 2022-01-28

## 2022-01-28 NOTE — TELEPHONE ENCOUNTER
----- Message from Brenda Givens MD sent at 1/28/2022 12:49 PM EST -----  Ultrasound shows no aneurysm. Call patient please.

## 2022-01-28 NOTE — TELEPHONE ENCOUNTER
Created telephone encounter. Spoke with Yan Leal relayed message per [facility] regarding ABD US. Pt verbalized understanding.

## 2022-02-03 ENCOUNTER — PATIENT MESSAGE (OUTPATIENT)
Dept: ENDOCRINOLOGY | Age: 52
End: 2022-02-03

## 2022-02-04 NOTE — TELEPHONE ENCOUNTER
I am not able to reach patient when I call him, I get his voicemail. Called 3 times, left 2 messages. Does he want or he does not want us to submit PA now? I am okay with waiting until labs are back if that is preferable.

## 2022-02-04 NOTE — TELEPHONE ENCOUNTER
From: Liza Polanco  To: Dr. Darian Carlisle  Sent: 2/3/2022 3:37 PM EST  Subject: Testosterone prescription     Hi. Currently the testosterone prescription is written for every 20 days. I was wondering if it could be written for every 60 days? Then I wouldnt have an oddball script to  each month. Ill be getting blood taken this [place] Praying we dont get the 1/2 of ice predicted. Anyway I figure you probably want to wait to write s new prescription u tip you ge the results of this in case I need to go back to 4 pumps daily or something else in a change. Thank you for your consideration of this minor issue.      Angel Boyer

## 2022-02-07 ENCOUNTER — TELEPHONE (OUTPATIENT)
Dept: ENDOCRINOLOGY | Age: 52
End: 2022-02-07

## 2022-02-09 NOTE — TELEPHONE ENCOUNTER
Please inform patient:  Based on new government regulations, which Autumn reinforces, patient needs to be seen in person in the office in order to prescribe controlled substances, including testosterone. If patient wants prescription for 2 months at the time, we can prescribe 4-month supply during the appointment. The other option to prescribe monthly prescription with 2 refills and to be seen every 3 months. Both ways, wait for lab work results first.  Because he already had appointment, I will send prescription to pharmacy after results are available. However, further prescriptions only will be refilled during appointment. If appointment is missed, needs to be rescheduled in order to obtain refill.

## 2022-02-10 RX ORDER — KETOCONAZOLE 20 MG/ML
SHAMPOO TOPICAL
Qty: 120 ML | Refills: 11 | Status: SHIPPED | OUTPATIENT
Start: 2022-02-10 | End: 2022-03-01 | Stop reason: SDUPTHER

## 2022-02-10 RX ORDER — AZELASTINE 1 MG/ML
SPRAY, METERED NASAL
Qty: 30 ML | Refills: 2 | Status: SHIPPED | OUTPATIENT
Start: 2022-02-10 | End: 2022-05-09 | Stop reason: SDUPTHER

## 2022-02-10 NOTE — TELEPHONE ENCOUNTER
Medication:   Requested Prescriptions     Pending Prescriptions Disp Refills    azelastine (ASTELIN) 0.1 % nasal spray [Pharmacy Med Name: AZELASTINE 0.1% (137 MCG) SPRY] 30 mL      Sig: SPRAY TWO SPRAYS IN EACH NOSTRIL TWICE DAILY        Last Filled: 10/8/2021   Last appt: 12/29/2021   Next appt: 3/4/2022

## 2022-02-10 NOTE — TELEPHONE ENCOUNTER
I apologize for the lack of communicating all in the last message. First I verified that testosterone does need a PA. Im doing the blood work and figure we can wait a few days in case the results require a change in the amount I take. I followed up and notified Dr Juanito Garibay that you would like him to handle the Omega 3. I verified that Inna Reich needs a PA for the MRI at [facility]. Its scheduled for 2/16/22. I didnt realize how close it was. The doctor was clear that there is a dept that does PAs. I dont know how to contact them directly. If you would like me to, please provide the contact and Ill try. Mark limits who we have the option to send notes to though. Finally I apologize if I was confusing in any of my messages. Thuy had quite a bit of stress lately. That practically bottoms me out making everything so much more challenging. There was a time before endocrine issues that I thrived on stress. Not anymore!     Sincerely,  Sherron Bending

## 2022-02-11 ENCOUNTER — HOSPITAL ENCOUNTER (OUTPATIENT)
Age: 52
Discharge: HOME | End: 2022-02-11
Payer: MEDICAID

## 2022-02-11 DIAGNOSIS — E78.49 OTHER HYPERLIPIDEMIA: ICD-10-CM

## 2022-02-11 DIAGNOSIS — Z86.39 HISTORY OF ADRENAL INSUFFICIENCY: ICD-10-CM

## 2022-02-11 DIAGNOSIS — E55.9 VITAMIN D DEFICIENCY: ICD-10-CM

## 2022-02-11 DIAGNOSIS — R79.89 ELEVATED INSULIN-LIKE GROWTH FACTOR 1 (IGF-1) LEVEL: ICD-10-CM

## 2022-02-11 DIAGNOSIS — R53.83 OTHER FATIGUE: ICD-10-CM

## 2022-02-11 DIAGNOSIS — E23.0 HYPOGONADOTROPIC HYPOGONADISM (HCC): ICD-10-CM

## 2022-02-11 DIAGNOSIS — D35.2 PITUITARY MICROADENOMA (HCC): ICD-10-CM

## 2022-02-11 LAB
A/G RATIO: 1.4 (ref 1.1–2.2)
ALBUMIN SERPL-MCNC: 4.2 G/DL (ref 3.4–5)
ALP BLD-CCNC: 86 U/L (ref 40–129)
ALT SERPL-CCNC: 31 U/L (ref 10–40)
ANION GAP SERPL CALCULATED.3IONS-SCNC: 9 MMOL/L (ref 3–16)
ANTI-THYROGLOB ABS: 13 IU/ML
AST SERPL-CCNC: 33 U/L (ref 15–37)
BASOPHILS ABSOLUTE: 0 K/UL (ref 0–0.2)
BASOPHILS RELATIVE PERCENT: 0.9 %
BILIRUB SERPL-MCNC: 0.5 MG/DL (ref 0–1)
BUN BLDV-MCNC: 12 MG/DL (ref 7–20)
CALCIUM SERPL-MCNC: 9.6 MG/DL (ref 8.3–10.6)
CHLORIDE BLD-SCNC: 101 MMOL/L (ref 99–110)
CHOLESTEROL, TOTAL: 176 MG/DL (ref 0–199)
CO2: 31 MMOL/L (ref 21–32)
CORTISOL - AM: 5.1 UG/DL (ref 4.3–22.4)
CREAT SERPL-MCNC: 0.9 MG/DL (ref 0.9–1.3)
EOSINOPHILS ABSOLUTE: 0.1 K/UL (ref 0–0.6)
EOSINOPHILS RELATIVE PERCENT: 2.6 %
FOLLICLE STIMULATING HORMONE: 0.1 MIU/ML
GFR AFRICAN AMERICAN: >60
GFR NON-AFRICAN AMERICAN: >60
GLUCOSE BLD-MCNC: 103 MG/DL (ref 70–99)
HCT VFR BLD CALC: 42.9 % (ref 40.5–52.5)
HDLC SERPL-MCNC: 31 MG/DL (ref 40–60)
HEMOGLOBIN: 14.7 G/DL (ref 13.5–17.5)
LDL CHOLESTEROL CALCULATED: 96 MG/DL
LUTEINIZING HORMONE: <0.1 MIU/ML
LYMPHOCYTES ABSOLUTE: 1.5 K/UL (ref 1–5.1)
LYMPHOCYTES RELATIVE PERCENT: 41.5 %
MCH RBC QN AUTO: 29.2 PG (ref 26–34)
MCHC RBC AUTO-ENTMCNC: 34.3 G/DL (ref 31–36)
MCV RBC AUTO: 85.2 FL (ref 80–100)
MONOCYTES ABSOLUTE: 0.3 K/UL (ref 0–1.3)
MONOCYTES RELATIVE PERCENT: 7.8 %
NEUTROPHILS ABSOLUTE: 1.8 K/UL (ref 1.7–7.7)
NEUTROPHILS RELATIVE PERCENT: 47.2 %
PDW BLD-RTO: 13.7 % (ref 12.4–15.4)
PLATELET # BLD: 129 K/UL (ref 135–450)
PMV BLD AUTO: 7.9 FL (ref 5–10.5)
POTASSIUM SERPL-SCNC: 4.1 MMOL/L (ref 3.5–5.1)
PROLACTIN: 12.6 NG/ML
RBC # BLD: 5.03 M/UL (ref 4.2–5.9)
SODIUM BLD-SCNC: 141 MMOL/L (ref 136–145)
T3 TOTAL: 1.38 NG/ML (ref 0.8–2)
T4 FREE: 1.1 NG/DL (ref 0.9–1.8)
T4 TOTAL: 6.3 UG/DL (ref 4.5–10.9)
THYROID PEROXIDASE (TPO) ABS: 8 IU/ML
TOTAL PROTEIN: 7.1 G/DL (ref 6.4–8.2)
TRIGL SERPL-MCNC: 244 MG/DL (ref 0–150)
TSH SERPL DL<=0.05 MIU/L-ACNC: 5.76 UIU/ML (ref 0.27–4.2)
VITAMIN D 25-HYDROXY: 39.1 NG/ML
VLDLC SERPL CALC-MCNC: 49 MG/DL
WBC # BLD: 3.7 K/UL (ref 4–11)

## 2022-02-11 PROCEDURE — 84403 ASSAY OF TOTAL TESTOSTERONE: CPT

## 2022-02-11 PROCEDURE — 86376 MICROSOMAL ANTIBODY EACH: CPT

## 2022-02-11 PROCEDURE — 83001 ASSAY OF GONADOTROPIN (FSH): CPT

## 2022-02-11 PROCEDURE — 84146 ASSAY OF PROLACTIN: CPT

## 2022-02-11 PROCEDURE — 80053 COMPREHEN METABOLIC PANEL: CPT

## 2022-02-11 PROCEDURE — 84480 ASSAY TRIIODOTHYRONINE (T3): CPT

## 2022-02-11 PROCEDURE — 84270 ASSAY OF SEX HORMONE GLOBUL: CPT

## 2022-02-11 PROCEDURE — 84439 ASSAY OF FREE THYROXINE: CPT

## 2022-02-11 PROCEDURE — 83002 ASSAY OF GONADOTROPIN (LH): CPT

## 2022-02-11 PROCEDURE — 82533 TOTAL CORTISOL: CPT

## 2022-02-11 PROCEDURE — 82627 DEHYDROEPIANDROSTERONE: CPT

## 2022-02-11 PROCEDURE — 82024 ASSAY OF ACTH: CPT

## 2022-02-11 PROCEDURE — 80061 LIPID PANEL: CPT

## 2022-02-11 PROCEDURE — 85025 COMPLETE CBC W/AUTO DIFF WBC: CPT

## 2022-02-11 PROCEDURE — 82306 VITAMIN D 25 HYDROXY: CPT

## 2022-02-11 PROCEDURE — 84443 ASSAY THYROID STIM HORMONE: CPT

## 2022-02-11 PROCEDURE — 36415 COLL VENOUS BLD VENIPUNCTURE: CPT

## 2022-02-11 PROCEDURE — 84305 ASSAY OF SOMATOMEDIN: CPT

## 2022-02-11 PROCEDURE — 83003 ASSAY GROWTH HORMONE (HGH): CPT

## 2022-02-11 PROCEDURE — 86800 THYROGLOBULIN ANTIBODY: CPT

## 2022-02-11 NOTE — TELEPHONE ENCOUNTER
Hi. I have completed all of the blood tests requested at [facility] in [place]. Dr Albaro Zhu wanted it done at [facility] because she knows the assay values. We have had issues in the past. Dr Aaron Carmona had me go to all 3. Turned out lab mono was the one she felt confident in. I trust what you are doing. Just re sharing. That was a lot of info I provided at first meeting.      From previous messages I understand there is a separate PA group but I dont know how to get the request to them. Its possible the testosterone could be approved for my sync day on Monday. I learned that by surprise when I spoke to [facility] yesterday. If possible that would be great! Of course we need the results first. [facility] said if the doc wants she can request an urgent review and they will do within 24 hours or faster. So if we get results by Monday or sooner for testosterone then maybe she could send the prescription today? Sorry for focusing so much on this.  Excited to learn what results from blood show.      Thank you,     Luisa Coleman

## 2022-02-11 NOTE — TELEPHONE ENCOUNTER
I informed patient that testosterone result might take longer to get back. Patient was asking to make sure that MRI has a been approved.   I informed him that I will send to my nurse regarding the process of MRI approval.

## 2022-02-13 LAB — GROWTH HORMONE: <0.05 NG/ML (ref 0.05–3)

## 2022-02-15 LAB
SEX HORMONE BINDING GLOBULIN: 53 NMOL/L (ref 11–80)
TESTOSTERONE FREE-NONMALE: 84.8 PG/ML (ref 47–244)
TESTOSTERONE TOTAL: 546 NG/DL (ref 220–1000)

## 2022-02-16 DIAGNOSIS — E23.0 HYPOGONADOTROPIC HYPOGONADISM (HCC): ICD-10-CM

## 2022-02-16 LAB
ADRENOCORTICOTROPIC HORMONE: 19 PG/ML (ref 7–69)
DHEAS (DHEA SULFATE): 32.6 UG/DL (ref 70–310)
IGF-1 (INSULIN-LIKE GROWTH I): 143 NG/ML (ref 64–218)
INSULIN-LIKE GROWTH FACTOR-1 Z-SCORE: 0.4

## 2022-02-16 RX ORDER — TESTOSTERONE 30 MG/1.5ML
120 SOLUTION TOPICAL DAILY
Qty: 2 EACH | Refills: 0 | Status: SHIPPED
Start: 2022-02-16 | End: 2022-02-17

## 2022-02-16 NOTE — TELEPHONE ENCOUNTER
I have testosterone results back. Based on the level, we can increase testosterone to 4 presses daily. I will send prescription for 2 bottles. Then we will can decide during the appointment with exact amount to last until next appointment. Let me know if that is okay.

## 2022-02-17 DIAGNOSIS — E23.0 HYPOGONADOTROPIC HYPOGONADISM (HCC): ICD-10-CM

## 2022-02-17 RX ORDER — TESTOSTERONE 30 MG/1.5ML
120 SOLUTION TOPICAL DAILY
Qty: 2 EACH | Refills: 0 | Status: SHIPPED | OUTPATIENT
Start: 2022-02-17 | End: 2022-03-17

## 2022-02-18 ENCOUNTER — TELEPHONE (OUTPATIENT)
Dept: ENDOCRINOLOGY | Age: 52
End: 2022-02-18

## 2022-02-19 NOTE — TELEPHONE ENCOUNTER
Please inform patient that pituitary MRI did not show pituitary tumor. There are some changes related to trigeminal nerve. Please contact PCP for further instructions regarding evaluation.

## 2022-02-21 ENCOUNTER — TELEPHONE (OUTPATIENT)
Dept: FAMILY MEDICINE CLINIC | Age: 52
End: 2022-02-21

## 2022-02-21 NOTE — TELEPHONE ENCOUNTER
Called and informed pt, pt expressed understanding. FYI:  Pt states that he spoke w/ PCP about medications about platelets, he only responded to 1/2 of the message. Pt states when he had something done w/ PCP, his sugar was high cause he had a nighttime sweet snack. Pt states he has a vv w/ pcp this month and will discuss w/ him.

## 2022-02-21 NOTE — TELEPHONE ENCOUNTER
Pt called to see if Dr. Katerina Mcwilliams could look at the notes from 02/18 from Dr. Bailey Omer. Pt is scheduled to come in for an appt with Dr. Katerina Mcwilliams on 03/04 to discuss additional blood work. Please have Dr. Tono Lynn review Dr. Keri Deleon not and advise pt on his next step.

## 2022-02-22 ENCOUNTER — TELEPHONE (OUTPATIENT)
Dept: ENDOCRINOLOGY | Age: 52
End: 2022-02-22

## 2022-02-22 NOTE — TELEPHONE ENCOUNTER
Submitted PA for Testosterone 30MG/ACT solution Key: [id number] - PA Case ID: [id number] - Rx #: [id number]   Via CMM STATUS: Approved

## 2022-03-01 ENCOUNTER — OFFICE VISIT (OUTPATIENT)
Dept: DERMATOLOGY | Age: 52
End: 2022-03-01
Payer: MEDICAID

## 2022-03-01 DIAGNOSIS — L82.1 SEBORRHEIC KERATOSES: ICD-10-CM

## 2022-03-01 DIAGNOSIS — L71.9 ROSACEA: ICD-10-CM

## 2022-03-01 DIAGNOSIS — L72.0 EPIDERMAL CYST: Primary | ICD-10-CM

## 2022-03-01 DIAGNOSIS — L21.9 SEBORRHEIC DERMATITIS: ICD-10-CM

## 2022-03-01 PROCEDURE — 3017F COLORECTAL CA SCREEN DOC REV: CPT | Performed by: DERMATOLOGY

## 2022-03-01 PROCEDURE — G8427 DOCREV CUR MEDS BY ELIG CLIN: HCPCS | Performed by: DERMATOLOGY

## 2022-03-01 PROCEDURE — 1036F TOBACCO NON-USER: CPT | Performed by: DERMATOLOGY

## 2022-03-01 PROCEDURE — 99214 OFFICE O/P EST MOD 30 MIN: CPT | Performed by: DERMATOLOGY

## 2022-03-01 PROCEDURE — G8482 FLU IMMUNIZE ORDER/ADMIN: HCPCS | Performed by: DERMATOLOGY

## 2022-03-01 PROCEDURE — G8417 CALC BMI ABV UP PARAM F/U: HCPCS | Performed by: DERMATOLOGY

## 2022-03-01 RX ORDER — KETOCONAZOLE 20 MG/ML
SHAMPOO TOPICAL
Qty: 120 ML | Refills: 11 | Status: SHIPPED | OUTPATIENT
Start: 2022-03-01

## 2022-03-01 NOTE — PROGRESS NOTES
[facility]) Dermatology  Jeannine Deleon M.D.  [phone]       Ami Contreras  1970    46 y.o. male     Date of Visit: 3/1/2022    Chief Complaint:   Chief Complaint   Patient presents with    Skin Lesion        I was asked to see this patient by Dr. Harrison ref. provider found. History of Present Illness:  1. Total-body skin exam    Seborrheic dermatitis-washes with ketoconazole 2% shampoo-tends to use this intermittently. Develops pruritus, will start using ketoconazole, pruritus tends to improve. Rarely develops a rash on his scalp as he uses ketoconazole promptly. Rosacea-uses MetroCream 0.75% at least once a day and usually twice daily. If he tries to avoid MetroCream, develops erythema, scale, inflammatory papules. Happy with his improvement. Multiple nevi. Stable in size, shape, color. Has not noticed any new or changing pigmented lesions. Increasing number of epidermal cysts over his chest, back, left cheek-seem to be increasing in size. Review of Systems:  Constitutional: Reports general sense of well-being       Past Medical History, Surgical History, Family History, Medications and Allergies reviewed.     Social History:   Social History     Socioeconomic History    Marital status:      Spouse name: Not on file    Number of children: Not on file    Years of education: Not on file    Highest education level: Not on file   Occupational History    Not on file   Tobacco Use    Smoking status: Never Smoker    Smokeless tobacco: Never Used   Vaping Use    Vaping Use: Never used   Substance and Sexual Activity    Alcohol use: No    Drug use: No    Sexual activity: Yes     Partners: Female     Comment: ; 3 children   Other Topics Concern    Not on file   Social History Narrative    Not on file     Social Determinants of Health     Financial Resource Strain:     Difficulty of Paying Living Expenses: Not on file   Food Insecurity:     Worried About Running Out of Food in the Last Year: Not on file    Ran Out of Food in the Last Year: Not on file   Transportation Needs:     Lack of Transportation (Medical): Not on file    Lack of Transportation (Non-Medical): Not on file   Physical Activity:     Days of Exercise per Week: Not on file    Minutes of Exercise per Session: Not on file   Stress:     Feeling of Stress : Not on file   Social Connections:     Frequency of Communication with Friends and Family: Not on file    Frequency of Social Gatherings with Friends and Family: Not on file    Attends [employer] Services: Not on file    Active Member of [address] [place] [address] [email or web address] or Organizations: Not on file    Attends Club or Organization Meetings: Not on file    Marital Status: Not on file   Intimate Partner Violence:     Fear of Current or Ex-Partner: Not on file    Emotionally Abused: Not on file    Physically Abused: Not on file    Sexually Abused: Not on file   Housing Stability:     Unable to Pay for Housing in the Last Year: Not on file    Number of [place] in the Last Year: Not on file    Unstable Housing in the Last Year: Not on file       Physical Examination       -General: Well-appearing, NAD  1. Normal affect. Total body skin exam including scalp, face, neck, chest, abdomen, back, bilateral upper extremities, bilateral lower extremities, ocular conjunctiva, external lips, and nails was performed. Examination normal unless stated below. Underwear area not examined. Scattered on the trunk and extremities are multiple well-defined round and oval symmetric smoothly-bordered uniformly brown macules and papules. Multiple Mobile nodules with punctum chest, back, left cheek  Background erythema of face-no active inflammatory papule  No rash on his scalp  Scattered hyperkeratotic stuck on papules scalp, torso      Assessment and Plan     1. Epidermal cyst-plastics consult for removal given multiple lesions and location on face   2.  Rosacea-continue MetroCream 0.75% twice daily-reviewed proper use and side effect   3. Seborrheic keratoses - Discussed underlying nature of seborrheic keratosis and low risk of malignancy. Treatment reserved for lesions that are itching, bleeding, growing or otherwise becoming bothersome. Discussed monitoring for change with reevaluation for changing lesions. Discussed risks and benefits of treatment-patient will consider   4. Seborrheic dermatitis-continue ketoconazole 2% shampoo-patient prefers to use intermittently. Also discussed ongoing use for maintenance.

## 2022-03-04 ENCOUNTER — OFFICE VISIT (OUTPATIENT)
Dept: FAMILY MEDICINE CLINIC | Age: 52
End: 2022-03-04
Payer: MEDICAID

## 2022-03-04 ENCOUNTER — PATIENT MESSAGE (OUTPATIENT)
Dept: FAMILY MEDICINE CLINIC | Age: 52
End: 2022-03-04

## 2022-03-04 VITALS
HEART RATE: 77 BPM | HEIGHT: 69 IN | TEMPERATURE: 97.3 F | WEIGHT: 208 LBS | BODY MASS INDEX: 30.81 KG/M2 | OXYGEN SATURATION: 98 % | SYSTOLIC BLOOD PRESSURE: 120 MMHG | DIASTOLIC BLOOD PRESSURE: 80 MMHG

## 2022-03-04 DIAGNOSIS — D69.6 THROMBOCYTOPENIA (HCC): ICD-10-CM

## 2022-03-04 DIAGNOSIS — F51.01 PRIMARY INSOMNIA: ICD-10-CM

## 2022-03-04 DIAGNOSIS — D36.10 SCHWANNOMA: Primary | ICD-10-CM

## 2022-03-04 DIAGNOSIS — F41.9 ANXIETY: ICD-10-CM

## 2022-03-04 PROBLEM — R09.1 PLEURISY: Status: RESOLVED | Noted: 2020-02-19 | Resolved: 2022-03-04

## 2022-03-04 PROBLEM — R07.2 PRECORDIAL PAIN: Status: RESOLVED | Noted: 2020-02-11 | Resolved: 2022-03-04

## 2022-03-04 PROBLEM — D35.2 PITUITARY MICROADENOMA (HCC): Status: RESOLVED | Noted: 2021-12-14 | Resolved: 2022-03-04

## 2022-03-04 PROBLEM — R94.6 ABNORMAL THYROID SCAN: Status: RESOLVED | Noted: 2017-02-04 | Resolved: 2022-03-04

## 2022-03-04 PROBLEM — Z86.39 HISTORY OF ADRENAL INSUFFICIENCY: Status: RESOLVED | Noted: 2021-12-14 | Resolved: 2022-03-04

## 2022-03-04 PROCEDURE — G8417 CALC BMI ABV UP PARAM F/U: HCPCS | Performed by: FAMILY MEDICINE

## 2022-03-04 PROCEDURE — G8482 FLU IMMUNIZE ORDER/ADMIN: HCPCS | Performed by: FAMILY MEDICINE

## 2022-03-04 PROCEDURE — 99214 OFFICE O/P EST MOD 30 MIN: CPT | Performed by: FAMILY MEDICINE

## 2022-03-04 PROCEDURE — 1036F TOBACCO NON-USER: CPT | Performed by: FAMILY MEDICINE

## 2022-03-04 PROCEDURE — G8427 DOCREV CUR MEDS BY ELIG CLIN: HCPCS | Performed by: FAMILY MEDICINE

## 2022-03-04 PROCEDURE — 3017F COLORECTAL CA SCREEN DOC REV: CPT | Performed by: FAMILY MEDICINE

## 2022-03-04 RX ORDER — CLONAZEPAM 1 MG/1
2 TABLET ORAL NIGHTLY PRN
Qty: 60 TABLET | Refills: 2 | Status: SHIPPED | OUTPATIENT
Start: 2022-03-04 | End: 2022-06-06 | Stop reason: SDUPTHER

## 2022-03-04 RX ORDER — ALPRAZOLAM 2 MG/1
2 TABLET ORAL 2 TIMES DAILY PRN
Qty: 45 TABLET | Refills: 2 | Status: SHIPPED | OUTPATIENT
Start: 2022-03-04 | End: 2022-06-06 | Stop reason: SDUPTHER

## 2022-03-04 SDOH — ECONOMIC STABILITY: FOOD INSECURITY: WITHIN THE PAST 12 MONTHS, YOU WORRIED THAT YOUR FOOD WOULD RUN OUT BEFORE YOU GOT MONEY TO BUY MORE.: NEVER TRUE

## 2022-03-04 SDOH — ECONOMIC STABILITY: FOOD INSECURITY: WITHIN THE PAST 12 MONTHS, THE FOOD YOU BOUGHT JUST DIDN'T LAST AND YOU DIDN'T HAVE MONEY TO GET MORE.: NEVER TRUE

## 2022-03-04 ASSESSMENT — SOCIAL DETERMINANTS OF HEALTH (SDOH): HOW HARD IS IT FOR YOU TO PAY FOR THE VERY BASICS LIKE FOOD, HOUSING, MEDICAL CARE, AND HEATING?: NOT VERY HARD

## 2022-03-04 ASSESSMENT — PATIENT HEALTH QUESTIONNAIRE - PHQ9
5. POOR APPETITE OR OVEREATING: 0
6. FEELING BAD ABOUT YOURSELF - OR THAT YOU ARE A FAILURE OR HAVE LET YOURSELF OR YOUR FAMILY DOWN: 0
SUM OF ALL RESPONSES TO PHQ QUESTIONS 1-9: 9
2. FEELING DOWN, DEPRESSED OR HOPELESS: 0
3. TROUBLE FALLING OR STAYING ASLEEP: 3
SUM OF ALL RESPONSES TO PHQ QUESTIONS 1-9: 9
8. MOVING OR SPEAKING SO SLOWLY THAT OTHER PEOPLE COULD HAVE NOTICED. OR THE OPPOSITE, BEING SO FIGETY OR RESTLESS THAT YOU HAVE BEEN MOVING AROUND A LOT MORE THAN USUAL: 0
SUM OF ALL RESPONSES TO PHQ QUESTIONS 1-9: 9
7. TROUBLE CONCENTRATING ON THINGS, SUCH AS READING THE NEWSPAPER OR WATCHING TELEVISION: 3
10. IF YOU CHECKED OFF ANY PROBLEMS, HOW DIFFICULT HAVE THESE PROBLEMS MADE IT FOR YOU TO DO YOUR WORK, TAKE CARE OF THINGS AT HOME, OR GET ALONG WITH OTHER PEOPLE: 0
1. LITTLE INTEREST OR PLEASURE IN DOING THINGS: 0
SUM OF ALL RESPONSES TO PHQ9 QUESTIONS 1 & 2: 0
SUM OF ALL RESPONSES TO PHQ QUESTIONS 1-9: 9
4. FEELING TIRED OR HAVING LITTLE ENERGY: 3
9. THOUGHTS THAT YOU WOULD BE BETTER OFF DEAD, OR OF HURTING YOURSELF: 0

## 2022-03-04 ASSESSMENT — ANXIETY QUESTIONNAIRES
2. NOT BEING ABLE TO STOP OR CONTROL WORRYING: 2
IF YOU CHECKED OFF ANY PROBLEMS ON THIS QUESTIONNAIRE, HOW DIFFICULT HAVE THESE PROBLEMS MADE IT FOR YOU TO DO YOUR WORK, TAKE CARE OF THINGS AT HOME, OR GET ALONG WITH OTHER PEOPLE: VERY DIFFICULT
7. FEELING AFRAID AS IF SOMETHING AWFUL MIGHT HAPPEN: 0
GAD7 TOTAL SCORE: 12
5. BEING SO RESTLESS THAT IT IS HARD TO SIT STILL: 2
4. TROUBLE RELAXING: 2
1. FEELING NERVOUS, ANXIOUS, OR ON EDGE: 2
3. WORRYING TOO MUCH ABOUT DIFFERENT THINGS: 2
6. BECOMING EASILY ANNOYED OR IRRITABLE: 2

## 2022-03-04 NOTE — PROGRESS NOTES
Ade Weldon (:  1970) is a 46 y.o. male,Established patient, here for evaluation of the following chief complaint(s):  Follow-up and Medication Refill         ASSESSMENT/PLAN:  Jesús Allen was seen today for follow-up and medication refill. Diagnoses and all orders for this visit:    Schwannoma  -     External Referral To Neurosurgery  Referred to neurosurgery for further evaluation  Primary insomnia  -     clonazePAM (KLONOPIN) 1 MG tablet; Take 2 tablets by mouth nightly as needed (sleep) for up to 90 days. Stable on klonopin  Controlled Substances Monitoring: Periodic Controlled Substance Monitoring: Possible medication side effects, risk of tolerance/dependence & alternative treatments discussed. ,No signs of potential drug abuse or diversion identified. Nikhil Villar MD)   Anxiety  -     ALPRAZolam Marguarite Pedlar) 2 MG tablet; Take 1 tablet by mouth 2 times daily as needed for Anxiety for up to 90 days. Stable on xanax  Thrombocytopenia (HCC)  -     CBC with Auto Differential; Future  Recheck to see if stable       No follow-ups on file. Subjective   SUBJECTIVE/OBJECTIVE:  HPI   Pt is a of 46 y.o. male comes in today with   Chief Complaint   Patient presents with    Follow-up    Medication Refill     Last MRI noted 3 mm focus left cisternal TGN, unchanged from prior, possibly schwannoma. Anxiety stable on xanax  Klonopin stable on klonopin  Taking meds as prescribed. No side effects     Vitals:    22 1521   BP: 120/80   Site: Right Upper Arm   Position: Sitting   Cuff Size: Medium Adult   Pulse: 77   Temp: 97.3 °F (36.3 °C)   TempSrc: Temporal   SpO2: 98%   Weight: 208 lb (94.3 kg)   Height: 5' 9\" (1.753 m)        Past Medical History:Reviewed  Medications:Reviewed.   Allergies   Allergen Reactions    Ambien [Zolpidem] Other (See Comments)     Blackout lists    Haloperidol Hallucinations    Augmentin [Amoxicillin-Pot Clavulanate] Other (See Comments)     diarrhea    Elavil [Amitriptyline] Nausea Only and Other (See Comments)     JITTERY FEELING    Neurontin [Gabapentin] Nausea Only    Topamax Nausea Only    Tramadol Other (See Comments)     Patient has been on high dose pain medincines since 2002 so tramadol doesn't help with pain at all      Social hx:Reviewed. Social History     Tobacco Use   Smoking Status Never Smoker   Smokeless Tobacco Never Used     Review of Systems   Constitutional: Negative. Objective   Physical Exam         An electronic signature was used to authenticate this note.     --Kusum Collins MD

## 2022-03-07 NOTE — TELEPHONE ENCOUNTER
From: Liza Polanco  To: Dr. Tracy Littlejohn: 3/4/2022 3:40 PM EST  Subject: Insurance card    Trying to get my insurance card to you.

## 2022-03-17 ENCOUNTER — OFFICE VISIT (OUTPATIENT)
Dept: ENDOCRINOLOGY | Age: 52
End: 2022-03-17
Payer: MEDICAID

## 2022-03-17 VITALS
HEIGHT: 69 IN | DIASTOLIC BLOOD PRESSURE: 84 MMHG | WEIGHT: 203 LBS | SYSTOLIC BLOOD PRESSURE: 132 MMHG | HEART RATE: 70 BPM | RESPIRATION RATE: 14 BRPM | OXYGEN SATURATION: 98 % | TEMPERATURE: 98 F | BODY MASS INDEX: 30.07 KG/M2

## 2022-03-17 DIAGNOSIS — E78.49 OTHER HYPERLIPIDEMIA: ICD-10-CM

## 2022-03-17 DIAGNOSIS — Z86.39 HISTORY OF ADRENAL INSUFFICIENCY: ICD-10-CM

## 2022-03-17 DIAGNOSIS — E03.9 ACQUIRED HYPOTHYROIDISM: ICD-10-CM

## 2022-03-17 DIAGNOSIS — E55.9 VITAMIN D DEFICIENCY: ICD-10-CM

## 2022-03-17 DIAGNOSIS — R53.83 OTHER FATIGUE: ICD-10-CM

## 2022-03-17 DIAGNOSIS — E23.7 PITUITARY ABNORMALITY (HCC): ICD-10-CM

## 2022-03-17 DIAGNOSIS — R79.89 ELEVATED INSULIN-LIKE GROWTH FACTOR 1 (IGF-1) LEVEL: ICD-10-CM

## 2022-03-17 DIAGNOSIS — E23.0 HYPOGONADOTROPIC HYPOGONADISM (HCC): Primary | ICD-10-CM

## 2022-03-17 PROBLEM — D35.2 PITUITARY MICROADENOMA (HCC): Status: ACTIVE | Noted: 2022-03-17

## 2022-03-17 PROCEDURE — G8427 DOCREV CUR MEDS BY ELIG CLIN: HCPCS | Performed by: INTERNAL MEDICINE

## 2022-03-17 PROCEDURE — 1036F TOBACCO NON-USER: CPT | Performed by: INTERNAL MEDICINE

## 2022-03-17 PROCEDURE — G8482 FLU IMMUNIZE ORDER/ADMIN: HCPCS | Performed by: INTERNAL MEDICINE

## 2022-03-17 PROCEDURE — 99215 OFFICE O/P EST HI 40 MIN: CPT | Performed by: INTERNAL MEDICINE

## 2022-03-17 PROCEDURE — 3017F COLORECTAL CA SCREEN DOC REV: CPT | Performed by: INTERNAL MEDICINE

## 2022-03-17 PROCEDURE — G8417 CALC BMI ABV UP PARAM F/U: HCPCS | Performed by: INTERNAL MEDICINE

## 2022-03-17 RX ORDER — TESTOSTERONE 30 MG/1.5ML
120 SOLUTION TOPICAL DAILY
Qty: 2 EACH | Refills: 3 | Status: SHIPPED | OUTPATIENT
Start: 2022-03-17 | End: 2022-08-04

## 2022-03-17 RX ORDER — GLUCOSAMINE/D3/BOSWELLIA SERRA 1500MG-400
10000 TABLET ORAL DAILY
COMMUNITY

## 2022-03-17 RX ORDER — COSYNTROPIN 0.25 MG/ML
250 INJECTION, POWDER, FOR SOLUTION INTRAMUSCULAR; INTRAVENOUS ONCE
Status: CANCELLED | OUTPATIENT
Start: 2022-03-18 | End: 2022-03-18

## 2022-03-17 NOTE — PROGRESS NOTES
SUBJECTIVE:  Valerie Easley is a 46 y.o. male who is being evaluated for hypogonadism. 1. Hypogonadotropic hypogonadism ([facility] [address]  This started in 2006. Patient was diagnosed with hypogonadism. The problem has been unchanged. Patient started medication in 2006. Currently patient is on: Delene Maxcy. Misses  0 doses a month. In 2006 started hot flashes. Had Androgel, injections before. On Axiron 3 applications daily, recently increased to 4 applications daily. 2 years on it. Delene Maxcy worked the best.  No decreased libido or ED on Delene Maxcy    Was on [facility] replacement therapy. Not on it now. 2. Elevated insulin-like growth factor 1 (IGF-1) level  Elevated IGF-1  Had [facility] suppression test  Was found abnormal, he was prescribed medication, but patient did not take it initially. Later he took medication. Has hand swelling  Shoe size increased 0.5 size  Ring size increased  Had carpal tunnel surgery  Had dental changes, gaps    3. History of adrenal insufficiency   On dexamethasone small dose, then weaned off. In 2012 stopped medication  Was in [facility] for consultation  Not on medication since then. Later on dexamethasone 0.5 mg daily. Run out. He is not on dexamethasone   No problems when of dexamethasone. No hypotension, nausea, vomiting    Current complaints: fatigue, muscle weakness, loss of eyebrows. Fatigue was severe. 4. Pituitary microadenoma ([facility] [address]  In 2012 Dx with pituitary microadenoma  In 2010 had severe headaches, migraines, Dx with cluster headaches. Has Hx of pancreatitis due to gallstones. Has ongoing pancreas issues since then  Sees Dr. Devendra Villareal. History of obstructive symptoms: difficulty swallowing No, changes in voice/hoarseness No.  History of radiation to patient's neck: No  Resent iodine exposure: No  Family history includes no thyroid abnormalities. Family history of thyroid cancer: No    Gained weight, then lost some when stopped steroids. Eats healthy.     5. Other hyperlipidemia  Hyperlipidemia  No muscle pain    6. Vitamin D deficiency  Has fatigue    7. Other fatigue  Has severe fatigue    8. Hypothyroidism  Has severe fatigue    EXAM: MRI BRAIN W WO CONTRAST       INDICATION: Chronic fatigue. Concern for pituitary lesion.       TECHNIQUE:  MRI brain performed including multiplanar T1 and T2 weighted sequences, including    thin section images focused for evaluation of the pituitary gland. Imaging was obtained prior    to and after 11 mL of GADOBUTROL 1 MMOL/ML [facility]YR[facility] administered    intravenously.        COMPARISON: Multiple priors with latest MRI of brain dated 6/12/2018.       FINDINGS:       Adequate diagnostic quality.       Pituitary gland: The pituitary gland is mildly heterogeneous without focal lesion, similar to    the prior exam. The infundibulum is midline and is normal in thickness. No suprasellar mass. Normal cavernous sinuses.       Brain parenchyma: No diffusion restriction. Minimal small scattered foci of T2/FLAIR signal    hyperintensity throughout the subcortical and periventricular bifrontal lobes, unchanged. There    is a 3 mm focus of enhancement along the mid cisternal segment of the left trigeminal nerve    (series 12 image 60), unchanged from prior study. No definite signal abnormality is identified    on the T2 weighted acquisition, however partially limited given the slice thickness. Remaining    components of the left trigeminal nerve otherwise demonstrate normal signal and morphology. Mild ectopia the left cerebellar tonsil with rounded configuration, similar to the prior exam.       Ventricles and extraaxial spaces: Normal ventricular size and position. No extra-axial fluid    collection.       Marrow signal of calvarium and skull base: Normal.       Orbits, paranasal sinuses, and mastoid regions: No acute orbital abnormality. Clear paranasal    sinuses.  Aerated mastoid air cells.       Vascular structures: Normal arterial and venous flow voids.       Other: Normal included extracranial structures. Upper cervical spine without significant    abnormality.       11   IMPRESSION:        1.  Heterogeneous appearance of the pituitary without focal lesion identified, overall similar    prior. 2.  Unchanged minimal scattered cerebral white matter signal alteration, which remains    nonspecific. 3.  3 mm focus of enhancement along the left cisternal trigeminal nerve, unchanged from prior,    possibly a schwannoma.                 EXAMINATION:   THYROID ULTRASOUND       3/1/2017       COMPARISON:   12/20/2016       HISTORY:   ORDERING SYSTEM PROVIDED HISTORY: Abnormal thyroid scan       Chronic, subsequent       FINDINGS:   Right thyroid lobe:  4 x 1.7 x 2.2 cm       Left thyroid lobe:  4 x 1.7 x 1.6 cm       Isthmus:  0.4       Thyroid Gland:  Thyroid gland demonstrates normal echotexture and vascularity.       Nodules: No thyroid nodules are present.       Cervical lymphadenopathy: No abnormal lymph nodes in the imaged portions of   the neck.           Impression   Unremarkable thyroid ultrasound.  No nodules demonstrated.               6/12/2018  EXAM: MRI Brain with and without contrast        INDICATION: PITUITARY TUMOR.       TECHNIQUE: MRI brain and pituitary without and with 20 mL of Gadavist intravenously.        COMPARISON: 10/13/2016       FINDINGS:   The ventricles and extra-axial spaces are normal in size and morphology. There is minimal cerebral white matter T2/FLAIR hyperintense signal.   There is ectopia of the left cerebellar tonsil, unchanged. It remains rounded in morphology.       There is no mass lesion within the sella. The stalk is midline.       Previously demonstrated right hippocampal signal abnormality has resolved. There is no mass lesion, restricted diffusion or hemorrhage.    Flow voids are present in the intracranial vessels.       There is no abnormal enhancement.       IMPRESSION:   Unchanged minimal cerebral white matter T2/FLAIR hyperintense signal, again, nonspecific.    Unchanged left cerebellar tonsil ectopia.       Report Verified by: Alicia Rivero MD at 6/12/2018 4:07 PM EDT             Order History          Past Medical History:   Diagnosis Date    Acromegalia ([address]     Anxiety     Chronic pain syndrome     Chronic pancreatitis ([address]     Dyslipidemia     Elevated insulin-like growth factor 1 (IGF-1) level     Hypogonadism male     IFG (impaired fasting glucose)     Insomnia     Low serum cortisol level (HCC)     Neuropathic pain     Pancreatitis     Pituitary adenoma ([address]     Secondary adrenal insufficiency (HCC)      Patient Active Problem List    Diagnosis Date Noted    History of adrenal insufficiency 03/17/2022    Pituitary microadenoma ([address] 03/17/2022    Acquired hypothyroidism 03/17/2022    Hypogonadotropic hypogonadism ([address] 12/14/2021    Other hyperlipidemia 12/14/2021    Cervicalgia 10/26/2017    Primary osteoarthritis of right shoulder 10/19/2017    Pituitary abnormality ([address] 07/05/2016    Aortic root dilatation (HCC) 06/01/2016    Seborrheic dermatitis 05/17/2016    Dissection of abdominal aorta (HCC) 02/25/2015    Hyperlipidemia 02/25/2015    Cubital tunnel syndrome 12/03/2014    ADHD (attention deficit hyperactivity disorder) 11/12/2014    Elevated insulin-like growth factor 1 (IGF-1) level 10/16/2014    Carpal tunnel syndrome 08/15/2014    Benign hypertension 11/06/2013    Chronic pain syndrome     Neuropathic pain     Insomnia     Environmental allergies 09/11/2013    Vitamin D deficiency 06/05/2013    Chronic pancreatitis ([address] 01/02/2013    Other fatigue 01/02/2013    Migraines 07/06/2012    Anxiety      Past Surgical History:   Procedure Laterality Date    CHOLECYSTECTOMY      ERCP       Family History   Problem Relation Age of Onset    High Blood Pressure Mother     Diabetes Neg Hx      Social History     Socioeconomic History    Marital status:      Spouse name: None    Number of children: None    Years of education: None    Highest education level: None   Occupational History    None   Tobacco Use    Smoking status: Never Smoker    Smokeless tobacco: Never Used   Vaping Use    Vaping Use: Never used   Substance and Sexual Activity    Alcohol use: No    Drug use: No    Sexual activity: Yes     Partners: Female     Comment: ; 3 children   Other Topics Concern    None   Social History Narrative    None     Social Determinants of Health     Financial Resource Strain: Low Risk     Difficulty of Paying Living Expenses: Not very hard   Food Insecurity: No Food Insecurity    Worried About Running Out of Food in the Last Year: Never true    [place] of Food in the Last Year: Never true   Transportation Needs:     Lack of Transportation (Medical): Not on file    Lack of Transportation (Non-Medical):  Not on file   Physical Activity:     Days of Exercise per Week: Not on file    Minutes of Exercise per Session: Not on file   Stress:     Feeling of Stress : Not on file   Social Connections:     Frequency of Communication with Friends and Family: Not on file    Frequency of Social Gatherings with Friends and Family: Not on file    Attends Confucianist Services: Not on file    Active Member of [address] [place] [address] for; to (do) or Organizations: Not on file    Attends Club or Organization Meetings: Not on file    Marital Status: Not on file   Intimate Partner Violence:     Fear of Current or Ex-Partner: Not on file    Emotionally Abused: Not on file    Physically Abused: Not on file    Sexually Abused: Not on file   Housing Stability:     Unable to Pay for Housing in the Last Year: Not on file    Number of [place] in the Last Year: Not on file    Unstable Housing in the Last Year: Not on file     Current Outpatient Medications   Medication Sig Dispense Refill    Biotin 36939 MCG TABS Take 10,000 mcg by mouth daily      Testosterone (AXIRON) 30 MG/ACT SOLN Place 120 mg onto the skin daily for 30 days. 2 each 3    clonazePAM (KLONOPIN) 1 MG tablet Take 2 tablets by mouth nightly as needed (sleep) for up to 90 days. 60 tablet 2    ALPRAZolam (XANAX) 2 MG tablet Take 1 tablet by mouth 2 times daily as needed for Anxiety for up to 90 days. 45 tablet 2    metroNIDAZOLE (METROCREAM) 0.75 % cream APPLY TO AFFECTED AREA(S) TWO TIMES A DAY 45 g 11    ketoconazole (NIZORAL) 2 % shampoo Apply topically daily as needed. 120 mL 11    azelastine (ASTELIN) 0.1 % nasal spray SPRAY TWO SPRAYS IN EACH NOSTRIL TWICE DAILY 30 mL 2    fluticasone (FLONASE) 50 MCG/ACT nasal spray 1SPRAY 2 SPRAYS IN EACH NOSTRIL DAILY 1 each 2    omega-3 acid ethyl esters (LOVAZA) 1 g capsule Take 2 capsules by mouth 2 times daily 360 capsule 1    vitamin D (ERGOCALCIFEROL) 1.25 MG (25985 UT) CAPS capsule Take 1 capsule by mouth once a week 12 capsule 1    triamcinolone (KENALOG) 0.1 % cream Apply topically 2 times daily. 80 g 0    Multiple Vitamin (DAILY-SHANNON) TABS TAKE ONE TABLET BY MOUTH DAILY 30 tablet 1    Naloxegol Oxalate (MOVANTIK PO) Take by mouth      oxymorphone (OPANA ER) 40 MG ER tablet Take 40 mg by mouth 4 times daily       polyethylene glycol (GLYCOLAX) packet Take 17 g by mouth daily as needed.  Amylase-Lipase-Protease (CREON 20 PO) Take 20,000 mg by mouth three times daily       omeprazole (PRILOSEC) 20 MG capsule Take 20 mg by mouth Daily       oxycodone (OXY-IR) 30 MG immediate release tablet Take 30 mg by mouth 4 times daily        No current facility-administered medications for this visit.      Allergies   Allergen Reactions    Ambien [Zolpidem] Other (See Comments)     Blackout lists    Haloperidol Hallucinations    Augmentin [Amoxicillin-Pot Clavulanate] Other (See Comments)     diarrhea    Elavil [Amitriptyline] Nausea Only and Other (See Comments)     JITTERY FEELING    Neurontin [Gabapentin] Nausea Only    Topamax Nausea Only    Tramadol Other (See Comments)     Patient has been on high dose pain medincines since 2002 so tramadol doesn't help with pain at all     Family Status   Relation Name Status    Mother 61 Alive        HTN    Father 64 Alive    Other  (Not Specified)        No DM, DVT in family    Neg Hx  (Not Specified)       Review of Systems:  Constitutional: has fatigue, no fever, no recent weight gain, no recent weight loss, no changes in appetite  Eyes: no eye pain, no change in vision, no eye redness, no eye irritation, no double vision  Ears, nose, throat: has nasal congestion, no sore throat, no earache, no decrease in hearing, no hoarseness, no dry mouth, has sinus problems, no difficulty swallowing, no neck lumps, no dental problems, no mouth sores, no ringing in ears  Pulmonary: no shortness of breath, no wheezing, no dyspnea on exertion, no cough  Cardiovascular: no chest pain, no lower extremity edema, no orthopnea, no intermittent leg claudication, no palpitations  Gastrointestinal: has abdominal pain, no nausea, no vomiting, has diarrhea, has constipation, no dysphagia, has heartburn, no bloating  Genitourinary: no dysuria, no urinary incontinence, no urinary hesitancy, no urinary frequency, no feelings of urinary urgency, no nocturia  Musculoskeletal: no joint swelling, no joint stiffness, has joint pain, no muscle cramps, no muscle pain  Integument/Breast: no skin rashes, no skin lesions, no itching, has dry skin  Neurological: no numbness, no tingling, has weakness, no confusion, no headaches, no dizziness, no fainting, no tremors, no decrease in memory, no balance problems  Psychiatric: has anxiety, no depression, has insomnia  Hematologic/Lymphatic: no tendency for easy bleeding, no swollen lymph nodes, no tendency for easy bruising  Immunology: has seasonal allergies, no frequent infections, no frequent illnesses  Endocrine: has temperature intolerance    /84   Pulse 70   Temp 98 °F (36.7 °C)   Resp 14    5' 9\" (1.753 m)   Wt 203 lb (92.1 kg)   SpO2 98%   BMI 29.98 kg/m²    Wt Readings from Last 3 Encounters:   03/17/22 203 lb (92.1 kg)   03/04/22 208 lb (94.3 kg)   12/29/21 191 lb (86.6 kg)     Body mass index is 29.98 kg/m².     OBJECTIVE:  Constitutional: no acute distress, well appearing and well nourished  Psychiatric: oriented to person, place and time, judgement and insight and normal, recent and remote memory and intact and mood and affect are normal  Skin: skin and subcutaneous tissue is normal without mass, normal turgor  Head and Face: examination of head and face revealed no abnormalities  Eyes: no lid or conjunctival swelling, erythema or discharge, pupils are normal, equal, round, reactive to light  Ears/Nose: external inspection of ears and nose revealed no abnormalities, hearing is grossly normal  Oropharynx/Mouth/Face: lips, tongue and gums are normal with no lesions, the voice quality was normal  Neck: neck is supple and symmetric, with midline trachea and no masses, thyroid is normal  Lymphatics: normal cervical lymph nodes, normal supraclavicular nodes  Pulmonary: no increased work of breathing or signs of respiratory distress, lungs are clear to auscultation  Cardiovascular: normal heart rate and rhythm, normal S1 and S2, no murmurs and pedal pulses and 2+ bilaterally, No edema  Abdomen: abdomen is soft, non-tender with no masses  Musculoskeletal: normal gait and station and exam of the digits and nails are normal  Neurological: normal coordination and normal general cortical function      Lab Review:    Lab Results   Component Value Date    WBC 3.7 02/11/2022    HGB 14.7 02/11/2022    HCT 42.9 02/11/2022    MCV 85.2 02/11/2022     02/11/2022     Lab Results   Component Value Date     02/11/2022    K 4.1 02/11/2022     02/11/2022    CO2 31 02/11/2022    BUN 12 02/11/2022    CREATININE 0.9 02/11/2022    GLUCOSE 103 02/11/2022    CALCIUM 9.6 02/11/2022    PROT 7.1 02/11/2022    PROT 6.8 03/15/2013    LABALBU 4.2 02/11/2022    BILITOT 0.5 02/11/2022    ALKPHOS 86 02/11/2022    AST 33 02/11/2022    ALT 31 02/11/2022    LABGLOM >60 02/11/2022    LABGLOM >60>60 05/25/2010    GFRAA >60 02/11/2022    GFRAA >60 03/15/2013    AGRATIO 1.4 02/11/2022    GLOB 2.4 05/23/2016     Lab Results   Component Value Date    TSHFT4 2.39 10/08/2021    TSH 5.76 02/11/2022    FT3 3.2 08/21/2018     Lab Results   Component Value Date    LABA1C 5.4 05/23/2016     Lab Results   Component Value Date    .3 05/23/2016     Lab Results   Component Value Date    CHOL 176 02/11/2022     Lab Results   Component Value Date    TRIG 244 02/11/2022     Lab Results   Component Value Date    HDL 31 02/11/2022    HDL 36 05/11/2012     Lab Results   Component Value Date    LDLCHOLESTEROL 128 10/24/2014    LDLCALC 96 02/11/2022     Lab Results   Component Value Date    LABVLDL 49 02/11/2022    VLDL 34 08/21/2018     Lab Results   Component Value Date    CHOLHDLRATIO 7.4 08/21/2018     No results found for: Frances Villeda  Lab Results   Component Value Date    VITD25 39.1 02/11/2022        ASSESSMENT/PLAN:    1. Hypogonadotropic hypogonadism ([facility] [address]  Testosterone 546  Continue Axiron, recently increased to 4 applications daily. Had Androgel, injections before. On Axiron 3 applications daily, recently increased to 4 applications daily. 2 years on it. Pedro Luis Lundberg worked the best.  No decreased libido or ED on Axiron  - Testosterone, free, total; Future  - Comprehensive Metabolic Panel; Future  - CBC Auto Differential; Future    2. Vitamin D deficiency  25 hydroxy vitamin D 39.1  - Comprehensive Metabolic Panel; Future  - Vitamin D 25 Hydroxy; Future  - vitamin D (ERGOCALCIFEROL) 1.25 MG (55804 UT) CAPS capsule; Take 1 capsule by mouth once a week  Dispense: 12 capsule; Refill: 1    3. Other fatigue  Continue monitoring  Healthy diet, exercise    4.  Elevated insulin-like growth factor 1 (IGF-1) level  IGF-I 143, normal  Patient was concerned about low growth hormone. Discussed that growth hormone level is fluctuating through the day. IGF-I was normal.  - Prolactin; Future  - Insulin-Like Growth Factor; Future    5. Pituitary abnormality  ACTH 19  Prolactin 12.6  1.  Heterogeneous appearance of the pituitary without focal lesion identified, overall similar    prior.     - MRI BRAIN W WO CONTRAST; Future    6. History of adrenal insufficiency  DHEA S 32.6  Cortisol 5.1  FSH 0.1  LH less than 0.1  Order Cortrosyn stim test.  Do test prior to starting thyroid replacement therapy. - ACTH; Future  - Cortisol AM, Total; Future    7. Other hyperlipidemia  LDL 96  Triglycerides 244  HDL 31  - Lipid Panel; Future    8. Hypothyroidism  Start levothyroxine if Cortrosyn stimulation test results are normal.  TSH 5.76  New problem, not treated, previously had elevated TSH as well.   TPO antibody, thyroglobulin antibody negative  Will start treatment after Cortrosyn stimulation test if normal.  -TSH  -Free T4  -Free T3      Reviewed and/or ordered clinical lab results Yes  Reviewed and/or ordered radiology tests Yes   Reviewed and/or ordered other diagnostic tests No  Discussed test results with performing physician No  Independently reviewed image, tracing, or specimen No  Made a decision to obtain old records No  Reviewed and summarized old records Yes   TSH 2.39  IGF-1 131  Obtained history from other than patient No    Felisha Lopez was counseled regarding symptoms of hypogonadism, pituitary disease, adrenal insufficiency, endocrine causes of fatigue diagnosis, course and complications of disease if inadequately treated, side effects of medications, diagnosis, treatment options, and prognosis, risks, benefits, complications, and alternatives of treatment, labs, imaging and other studies and treatment targets and goals, testing for adrenal insufficiency, hypothyroidism diagnosis and treatment, thyroid hormone replacement therapy,

## 2022-05-04 ENCOUNTER — OFFICE VISIT (OUTPATIENT)
Dept: SURGERY | Age: 52
End: 2022-05-04
Payer: MEDICAID

## 2022-05-04 VITALS
WEIGHT: 190 LBS | BODY MASS INDEX: 28.06 KG/M2 | HEART RATE: 70 BPM | DIASTOLIC BLOOD PRESSURE: 73 MMHG | SYSTOLIC BLOOD PRESSURE: 128 MMHG | TEMPERATURE: 97.8 F | OXYGEN SATURATION: 98 %

## 2022-05-04 DIAGNOSIS — L98.9 SKIN LESION: Primary | ICD-10-CM

## 2022-05-04 PROCEDURE — 99203 OFFICE O/P NEW LOW 30 MIN: CPT

## 2022-05-04 NOTE — PROGRESS NOTES
[facility]    CC: Skin lesions    Referring Physician: Danny Johnson MD    HPI: This is an 46 y.o.male with a PMHx as delineated below who presents to clinic in consultation for multiple skin lesion. The patient noticed the lesion for approximately a minimal 10-15 years. He notes that they are painful and itch at times. They have grown slightly over time. He has three skin lesions he presents with today. There has not been any improvement with at home treatments. Plastic surgery was consulted for evaluation and treatment.     PMHx:   Past Medical History:   Diagnosis Date    Acromegalia ([address]     Anxiety     Chronic pain syndrome     Chronic pancreatitis (HCC)     Dyslipidemia     Elevated insulin-like growth factor 1 (IGF-1) level     Hypogonadism male     IFG (impaired fasting glucose)     Insomnia     Low serum cortisol level (HCC)     Neuropathic pain     Pancreatitis     Pituitary adenoma (HCC)     Secondary adrenal insufficiency (HCC)      PSHx:   Past Surgical History:   Procedure Laterality Date    CHOLECYSTECTOMY      ERCP       Allergy:   Allergies   Allergen Reactions    Ambien [Zolpidem] Other (See Comments)     Blackout lists    Haloperidol Hallucinations    Augmentin [Amoxicillin-Pot Clavulanate] Other (See Comments)     diarrhea    Elavil [Amitriptyline] Nausea Only and Other (See Comments)     JITTERY FEELING    Neurontin [Gabapentin] Nausea Only    Topamax Nausea Only    Tramadol Other (See Comments)     Patient has been on high dose pain medincines since 2002 so tramadol doesn't help with pain at all       SHx:   Social History     Socioeconomic History    Marital status:      Spouse name: Not on file    Number of children: Not on file    Years of education: Not on file    Highest education level: Not on file   Occupational History    Not on file   Tobacco Use    Smoking status: Never Smoker    Smokeless tobacco: Never Used   Vaping Use    Vaping Use: Never used   Substance and Sexual Activity    Alcohol use: No    Drug use: No    Sexual activity: Yes     Partners: Female     Comment: ; 3 children   Other Topics Concern    Not on file   Social History Narrative    Not on file     Social Determinants of Health     Financial Resource Strain: Low Risk     Difficulty of Paying Living Expenses: Not very hard   Food Insecurity: No Food Insecurity    Worried About Running Out of Food in the Last Year: Never true    [place] of Food in the Last Year: Never true   Transportation Needs:     Lack of Transportation (Medical): Not on file    Lack of Transportation (Non-Medical): Not on file   Physical Activity:     Days of Exercise per Week: Not on file    Minutes of Exercise per Session: Not on file   Stress:     Feeling of Stress : Not on file   Social Connections:     Frequency of Communication with Friends and Family: Not on file    Frequency of Social Gatherings with Friends and Family: Not on file    Attends Anglican Services: Not on file    Active Member of [address] [place] [address] or Organizations: Not on file    Attends Club or Organization Meetings: Not on file    Marital Status: Not on file   Intimate Partner Violence:     Fear of Current or Ex-Partner: Not on file    Emotionally Abused: Not on file    Physically Abused: Not on file    Sexually Abused: Not on file   Housing Stability:     Unable to Pay for Housing in the Last Year: Not on file    Number of [place] in the Last Year: Not on file    Unstable Housing in the Last Year: Not on file     FHx: Family history of skin CA: none  Meds:   Current Outpatient Medications   Medication Sig Dispense Refill    Biotin 82034 MCG TABS Take 10,000 mcg by mouth daily      Testosterone (AXIRON) 30 MG/ACT SOLN Place 120 mg onto the skin daily for 30 days. 2 each 3    clonazePAM (KLONOPIN) 1 MG tablet Take 2 tablets by mouth nightly as needed (sleep) for up to 90 days.  60 tablet 2    ALPRAZolam (XANAX) 2 MG tablet Take 1 tablet by mouth 2 times daily as needed for Anxiety for up to 90 days. 45 tablet 2    metroNIDAZOLE (METROCREAM) 0.75 % cream APPLY TO AFFECTED AREA(S) TWO TIMES A DAY 45 g 11    ketoconazole (NIZORAL) 2 % shampoo Apply topically daily as needed. 120 mL 11    azelastine (ASTELIN) 0.1 % nasal spray SPRAY TWO SPRAYS IN EACH NOSTRIL TWICE DAILY 30 mL 2    fluticasone (FLONASE) 50 MCG/ACT nasal spray 1SPRAY 2 SPRAYS IN EACH NOSTRIL DAILY 1 each 2    omega-3 acid ethyl esters (LOVAZA) 1 g capsule Take 2 capsules by mouth 2 times daily 360 capsule 1    vitamin D (ERGOCALCIFEROL) 1.25 MG (38944 UT) CAPS capsule Take 1 capsule by mouth once a week 12 capsule 1    triamcinolone (KENALOG) 0.1 % cream Apply topically 2 times daily. 80 g 0    Multiple Vitamin (DAILY-SHANNON) TABS TAKE ONE TABLET BY MOUTH DAILY 30 tablet 1    Naloxegol Oxalate (MOVANTIK PO) Take by mouth      oxymorphone (OPANA ER) 40 MG ER tablet Take 40 mg by mouth 4 times daily       polyethylene glycol (GLYCOLAX) packet Take 17 g by mouth daily as needed.  Amylase-Lipase-Protease (CREON 20 PO) Take 20,000 mg by mouth three times daily       omeprazole (PRILOSEC) 20 MG capsule Take 20 mg by mouth Daily       oxycodone (OXY-IR) 30 MG immediate release tablet Take 30 mg by mouth 4 times daily        No current facility-administered medications for this visit. ROS   Constitutional: Negative for chills and fever. HENT: Negative for congestion, facial swelling, and voice change. Eyes: Negative for photophobia and visual disturbance. Respiratory: Negative for apnea, cough, chest tightness and shortness of breath. Cardiovascular: Negative for chest pain and palpitations. Gastrointestinal: Negative for dysphagia and early satiety. Genitourinary: Negative for difficulty urinating, dysuria, flank pain, frequency and hematuria.    Musculoskeletal: Negative for new gait problem, joint swelling and myalgias. Skin: Negative for color change, pallor and rash. Endocrine: negative for tremors, temperature intolerance or polydipsia. Allergic/Immunologic: Negative for new environmental or food allergies. Neurological: Negative for dizziness, seizures, speech difficulty, numbness. Hematological: Negative for adenopathy. Psychiatric/Behavioral: Negative for agitation and confusion. EXAM     /73   Pulse 70   Temp 97.8 °F (36.6 °C)   Wt 190 lb (86.2 kg)   SpO2 98%   BMI 28.06 kg/m²     GEN: NAD, pleasant, healthy  NECK: very small 1cm x 1 cm lesion back of neck, hard mobile pea size       CHEST:  very small 1cm x 1 cm lesion back of neck, hard mobile pea size          AXILLA:  very small 1cm x 1 cm lesion back of neck, hard mobile pea size               Constitutional: Patient is oriented to person, place, and time. Vital signs are normal. Patient  appears well-developed and well-nourished. Patient  is active and cooperative. Non-toxic appearance. No distress. Neck: Trachea normal and normal range of motion. Neck supple. No JVD present. No tracheal tenderness present. Carotid bruit is not present. No rigidity. No tracheal deviation and no edema present. No thyromegaly present. Cardiovascular: Normal rate, regular rhythm, normal heart sounds, intact distal pulses and normal pulses. Pulmonary/Chest: Effort normal and breath sounds normal. No stridor. No respiratory distress. Patient  has no wheezes. Patient has no rales. Patient exhibits no tenderness and no crepitus. Abdominal: Soft. Normal appearance and bowel sounds are normal. Patient exhibits no distension, no abdominal bruit, no ascites and no mass. There is no hepatosplenomegaly. There is no tenderness. There is no rigidity, no rebound, no guarding and no CVA tenderness. Musculoskeletal: Normal range of motion. Patient exhibits no edema or tenderness. Neurological: Patient is alert and oriented to person, place, and time. Patient has normal strength. Coordination and gait normal. GCS eye subscore is 4. GCS verbal subscore is 5. GCS motor subscore is 6. Skin: Skin is warm and dry. No abrasion and no rash noted. Patient  is not diaphoretic. No cyanosis or erythema. Psychiatric: Patient has a normal mood and affect. speech is normal and behavior is normal. Cognition and memory are normal.     PATHOLOGY/WORKUP: none    IMP: 51 y.o.male with multiple skin lesion. PLAN:  Will submit to insurance. Schedule at [facility] under local.     A discussion regarding surgical options including: skin lesion excision was performed with the patient . The pathophysiology of multiple lesion was also elucidated specifying need for resection, observation, & margins. Clinical photos were obtained. Additionally, discussion regarding the risks including, but not limited to: bleeding (potentially requiring transfusion or reoperation), infection, seroma, reoperation, poor cosmetic outcome, scarring, possible injury to surrounding structures during revisional surgery, diminished sensation, VTE (DVT/PE), and death was performed. All questions were answered in a satisfactory manner. The patient was counseled at length about the risks of santos Covid-19 during their perioperative period and any recovery window from their procedure. The patient was made aware that santos Covid-19  may worsen their prognosis for recovering from their procedure  and lend to a higher morbidity and/or mortality risk. All material risks, benefits, and reasonable alternatives including postponing the procedure were discussed. The patient does wish to proceed with the procedure at this time.           Sydnee Garcia, APRN - [address] [facility]  [phone]  05/04/22

## 2022-05-09 ENCOUNTER — PATIENT MESSAGE (OUTPATIENT)
Dept: FAMILY MEDICINE CLINIC | Age: 52
End: 2022-05-09

## 2022-05-09 ENCOUNTER — PATIENT MESSAGE (OUTPATIENT)
Dept: ENDOCRINOLOGY | Age: 52
End: 2022-05-09

## 2022-05-09 DIAGNOSIS — E55.9 VITAMIN D DEFICIENCY: ICD-10-CM

## 2022-05-09 RX ORDER — OMEGA-3-ACID ETHYL ESTERS 1 G/1
2 CAPSULE, LIQUID FILLED ORAL 2 TIMES DAILY
Qty: 360 CAPSULE | Refills: 0 | Status: SHIPPED | OUTPATIENT
Start: 2022-05-09 | End: 2022-05-11

## 2022-05-09 RX ORDER — AZELASTINE 1 MG/ML
SPRAY, METERED NASAL
Qty: 30 ML | Refills: 2 | Status: SHIPPED | OUTPATIENT
Start: 2022-05-09 | End: 2022-08-04

## 2022-05-09 RX ORDER — ERGOCALCIFEROL 1.25 MG/1
50000 CAPSULE ORAL WEEKLY
Qty: 12 CAPSULE | Refills: 0 | Status: SHIPPED | OUTPATIENT
Start: 2022-05-09 | End: 2022-05-11

## 2022-05-09 NOTE — TELEPHONE ENCOUNTER
From: Ernie Cruz  To: Dr. Stallworth Flower: 5/9/2022 2:16 AM EDT  Subject: 2 prescription refills from [facility]    Good morning,  Just sending a heads up that [facility] should have sent a refill request for omega 3 and vitamin D2. I hope to fill on Tuesday my sync day.     Thank you,    Mando Sagastume

## 2022-05-09 NOTE — TELEPHONE ENCOUNTER
Medication:   Requested Prescriptions     Pending Prescriptions Disp Refills    azelastine (ASTELIN) 0.1 % nasal spray 30 mL 2     Sig: SPRAY TWO SPRAYS IN EACH NOSTRIL TWICE DAILY        Last Filled: 2/10/2022   Last appt: 3/4/2022   Next appt: 6/6/2022

## 2022-05-09 NOTE — TELEPHONE ENCOUNTER
From: Kennedi Colon  To: Dr. Saul Gamez  Sent: 5/9/2022 2:14 AM EDT  Subject: Azelastine spray    Hi. Just sending a heads up that [facility] should have sent a request for refill on Azelastine spray. I expect to fill on Tuesday for my sync day.      Thank you  Ghazala Sutherland

## 2022-05-10 DIAGNOSIS — E55.9 VITAMIN D DEFICIENCY: ICD-10-CM

## 2022-05-11 RX ORDER — AZELASTINE 1 MG/ML
SPRAY, METERED NASAL
Qty: 30 ML | Refills: 2 | OUTPATIENT
Start: 2022-05-11

## 2022-05-11 RX ORDER — OMEGA-3-ACID ETHYL ESTERS 1 G/1
CAPSULE, LIQUID FILLED ORAL
Qty: 120 CAPSULE | Refills: 1 | Status: SHIPPED | OUTPATIENT
Start: 2022-05-11 | End: 2022-08-03 | Stop reason: SDUPTHER

## 2022-05-11 RX ORDER — ERGOCALCIFEROL 1.25 MG/1
CAPSULE ORAL
Qty: 4 CAPSULE | Refills: 1 | Status: SHIPPED | OUTPATIENT
Start: 2022-05-11 | End: 2022-08-03 | Stop reason: SDUPTHER

## 2022-05-17 ENCOUNTER — TELEPHONE (OUTPATIENT)
Dept: SURGERY | Age: 52
End: 2022-05-17

## 2022-05-17 NOTE — TELEPHONE ENCOUNTER
The patient was in the office to see Gloria 5-4-2022. PLAN:  Will submit to insurance. Schedule at [facility] under local.     According to the [facility] does not require prior authorization. I spoke with the patient at the home number listed. The patient would prefer to schedule in August. The patient is aware that I do not have an August schedule yet, but I will call him once I do to schedule. I will leave this phone note open.

## 2022-05-23 NOTE — PROGRESS NOTES
[facility] Office Note  5/25/2022     Chief Complaint   Patient presents with    6 Month Follow-Up    Migraine    Hyperlipidemia    Other     Aortic root dilation, dissection of abdominal aorta       Subjective:  Mr. Primo Trevino is here for cardiology follow up of HBP abdominal aortic localized dissection but no aneurysm( stable) , Hyperlipidemia, hypertension. [place]: Today, he states he has a terrible time sleeping. He has insomnia and sees  doctor Humza Post who is a sleep specialist.   He has an [address] that he uses to monitor his heart rate. He notices his heart rate regularly in the 40 bpm during the sleep. The lowest he has been alerted by his watch of low heart rate was 38 bpm. He is restless worried about it and interferes with his sleep. In past we have had two discussions in past at length and I have reassured him but that has not been effective. He says he is asking me third time about his slow heart rate and wants further assurances. Today resting average HR 55 bpm according to smart watch. Walking HR average 100 bpm on smart watch. Patient is taking all cardiac medications as prescribed and tolerates them well. Patient denies current edema, chest pain, sob, palpitations, dizziness or syncope. PMH of possible aortic dissection and hypertension. He had previously been seen by Dr Christopher Escamilla. He reports undergoing cardiology evaluation for chest pain and testing was wnl and felt anxiety/sytress related. CT scan of chest was obtained. He reports being diagnosed in January 2015 with pituitary tumor, extensive testing at [facility] and currently being watched for next 6 months. He was diagnosed with acromegaly. He reports chronic HA's which increase with blood pressure. He was prescribed Nadolol which has helped HA;s. His abdominal US from 10/2019 showed no aneurysm.   He's concerned he has aortic aneurysm and reports Dr. Zora Blanco states last CT scan was 2009 and needs to be followed. Most recent US for AAA 10/30/19 No evidence of abdominal aortic aneurysm. He took both the Verlena Darcy vaccine          Review of Systems:  12 point ROS negative in all areas as listed below except as in [place]  Constitutional, EENT, pulmonary, GI, , Musculoskeletal, skin, neurological, hematological, endocrine, Psychiatric      Reviewed past medical history, social, and family history. Non smoker quit 20 years ago, no alcohol, no illicit drugs  Family history is negative for premature coronary artery disease. Past Medical History:   Diagnosis Date    Acromegalia ([address]     Anxiety     Chronic pain syndrome     Chronic pancreatitis (HCC)     Dyslipidemia     Elevated insulin-like growth factor 1 (IGF-1) level     Hypogonadism male     IFG (impaired fasting glucose)     Insomnia     Low serum cortisol level (HCC)     Neuropathic pain     Pancreatitis     Pituitary adenoma (HCC)     Secondary adrenal insufficiency (HCC)      Past Surgical History:   Procedure Laterality Date    CHOLECYSTECTOMY      ERCP         Objective:   /88   Pulse 92   Ht 5' 9.5\" (1.765 m)   Wt 217 lb (98.4 kg)   SpO2 91%   BMI 31.59 kg/m²     Wt Readings from Last 3 Encounters:   05/25/22 217 lb (98.4 kg)   05/04/22 190 lb (86.2 kg)   03/17/22 203 lb (92.1 kg)       Physical Exam:  General: No Respiratory distress, appears well developed and well nourished. Eyes:  Sclera nonicteric periorbital edema  Nose/Sinuses:  negative findings: nose shows no deformity, asymmetry, or inflammation, nasal mucosa normal, septum midline with no perforation or bleeding  Back:  no pain to palpation  Joint:  no active joint inflammation  Musculoskeletal:  negative  Skin:  Warm and dry seaborric dermatitis on neck and face face skin red  Neck:  Negative for JVD and Carotid Bruits. Chest:  Clear to auscultation, respiration easy  Cardiovascular:  RRR, S1S2 normal, no murmur, no rub or thrill.   Extremities:   No edema, clubbing, cyanosis,  Pulses: pedal pulses are normal.  Neuro: intact    Medications:   Outpatient Encounter Medications as of 5/25/2022   Medication Sig Dispense Refill    omega-3 acid ethyl esters (LOVAZA) 1 g capsule TAKE TWO CAPSULES BY MOUTH TWICE A  capsule 1    vitamin D (ERGOCALCIFEROL) 1.25 MG (37608 UT) CAPS capsule TAKE ONE CAPSULE BY MOUTH ONCE WEEKLY 4 capsule 1    azelastine (ASTELIN) 0.1 % nasal spray SPRAY TWO SPRAYS IN EACH NOSTRIL TWICE DAILY 30 mL 2    Biotin 23049 MCG TABS Take 10,000 mcg by mouth daily      Testosterone (AXIRON) 30 MG/ACT SOLN Place 120 mg onto the skin daily for 30 days. 2 each 3    clonazePAM (KLONOPIN) 1 MG tablet Take 2 tablets by mouth nightly as needed (sleep) for up to 90 days. 60 tablet 2    ALPRAZolam (XANAX) 2 MG tablet Take 1 tablet by mouth 2 times daily as needed for Anxiety for up to 90 days. 45 tablet 2    metroNIDAZOLE (METROCREAM) 0.75 % cream APPLY TO AFFECTED AREA(S) TWO TIMES A DAY 45 g 11    ketoconazole (NIZORAL) 2 % shampoo Apply topically daily as needed. 120 mL 11    fluticasone (FLONASE) 50 MCG/ACT nasal spray 1SPRAY 2 SPRAYS IN EACH NOSTRIL DAILY 1 each 2    triamcinolone (KENALOG) 0.1 % cream Apply topically 2 times daily. 80 g 0    Multiple Vitamin (DAILY-SHANNON) TABS TAKE ONE TABLET BY MOUTH DAILY 30 tablet 1    Naloxegol Oxalate (MOVANTIK PO) Take by mouth      oxymorphone (OPANA ER) 40 MG ER tablet Take 40 mg by mouth 4 times daily       polyethylene glycol (GLYCOLAX) packet Take 17 g by mouth daily as needed.       Amylase-Lipase-Protease (CREON 20 PO) Take 20,000 mg by mouth three times daily       omeprazole (PRILOSEC) 20 MG capsule Take 20 mg by mouth Daily       oxycodone (OXY-IR) 30 MG immediate release tablet Take 30 mg by mouth 4 times daily       mirtazapine (REMERON) 7.5 MG tablet       amphetamine-dextroamphetamine (ADDERALL XR) 30 MG extended release capsule        No facility-administered encounter medications on file as of 5/25/2022. Lab Data:  CBC: No results for input(s): WBC, HGB, HCT, MCV, PLT in the last 72 hours. BMP: No results for input(s): NA, K, CL, CO2, PHOS, BUN, CREATININE, CA in the last 72 hours. LIVER PROFILE: No results for input(s): AST, ALT, LIPASE, BILIDIR, BILITOT, ALKPHOS in the last 72 hours. Invalid input(s): AMYLASE,  ALB  LIPID:   Lab Results   Component Value Date    CHOL 176 02/11/2022    CHOL 177 08/21/2018    CHOL 169 05/23/2016     Lab Results   Component Value Date    TRIG 244 (H) 02/11/2022    TRIG 171 08/21/2018    TRIG 146 05/23/2016     Lab Results   Component Value Date    HDL 31 (L) 02/11/2022    HDL 24 (A) 08/21/2018    HDL 27 (L) 07/21/2017     Lab Results   Component Value Date    LDLCALC 96 02/11/2022    LDLCALC 119 08/21/2018    LDLCALC 130 (H) 07/21/2017    LDLCHOLESTEROL 128 10/24/2014     Lab Results   Component Value Date    LABVLDL 49 02/11/2022    LABVLDL 27 07/21/2017    LABVLDL 29 05/23/2016    VLDL 34 08/21/2018     Lab Results   Component Value Date    CHOLHDLRATIO 7.4 08/21/2018    CHOLHDLRATIO 5.7 (H) 05/25/2010     PT/INR: No results for input(s): PROTIME, INR in the last 72 hours. A1C:   Lab Results   Component Value Date    LABA1C 5.4 05/23/2016     BNP:  No results for input(s): BNP in the last 72 hours. IMAGING:  I have reviewed the following tests and documented in this encounter as follows:   Discussed with patient  EKG 5.25.22 within normal limits. US Abdominal aorta 2/25/2022  There is degradation of image quality related to bowel gas within the abdomen which precludes optimum visualization of the abdominal aorta. The abdominal aorta is normal in caliber with no evidence of aneurysm. Proximal abdominal aorta measures 2.4 x 2.1 cm. Mid abdominal aorta, 1.7 x 1.6 cm. Distal abdominal aorta measures 1.4 x 1.2 cm. Doppler signal was elicited from the aorta.      CXR 10/23/20  Mild elevation of right hemidiaphragm mild airspace disease RML likely atelectasis    Graded exercise stress test 2/5/20 ([address])         CXR 2/4/20      US AAA 10/30/19   No evidence of abdominal aortic aneurysm. EKG 8/8/18  NSR normal    ECHO 7/26/18  Summary   Normal LV systolic function with an estimated EF of 55%.   There is mild concentric left ventricular hypertrophy.   No regional wall motion abnormalities are seen.   Normal left ventricular diastolic filling pressure.   Mild mitral, aortic, and tricuspid regurgitation.     Carotid US 5/29/18  1. There is no gross plaque or stenosis in the internal carotid arteries  bilaterally. 2. The vertebral arteries are patent with antegrade flow bilaterally    Abdominal US 7/21/17  FINDINGS: Aorta:   Abdominal aorta is normal in caliber and not significantly changed from the prior study. Proximal aorta measures 1.6 x 1.3 cm. Mid aorta measures 1.6 x 1.3 cm. Distal aorta measure 1.2 x 1.4 cm. Vascular and Doppler interrogation is unremarkable. Iliacs:   Iliac arteries are patent and normal in caliber. ECHO 6/23/17  Normal left ventricular systolic function with an estimated ejection   fraction of 55%.   Normal left ventricular diastolic filling pressure.   The right ventricle is mildly enlarged.   The right atrium is mildly dilated.   Mild mitral regurgitation.   Mild aortic regurgitation.   Systolic pulmonary artery pressure (SPAP) is normal and estimated at 28 mmHg   (RA pressure 3 mmHg). ECHO 4/9/15  Summary  Normal left ventricle size and systolic function with an estimated   Ejection fraction of 55%. No regional wall motion abnormalities are seen. Mild  concentric LVH. Grade I diastolic function ( IVRT 116) with impaired relaxation. Mitral valve is structurally normal.  Mitral valve leaflets appear to open adequately. Mild mitral regurgitation is present. The aortic valve is normal in structure and function. There is no  significant aortic regurgitation.   The aortic RN.   I, Dr. Philip Shaffer, personally performed the services described in this documentation, as scribed by the above signed scribe in my presence. It is both accurate and complete to my knowledge. I agree with the details independently gathered by the clinical support staff, while the remaining scribed note accurately describes my personal service to the patient.

## 2022-05-25 ENCOUNTER — OFFICE VISIT (OUTPATIENT)
Dept: CARDIOLOGY CLINIC | Age: 52
End: 2022-05-25
Payer: MEDICAID

## 2022-05-25 ENCOUNTER — HOSPITAL ENCOUNTER (OUTPATIENT)
Age: 52
Discharge: HOME | End: 2022-05-25
Payer: MEDICAID

## 2022-05-25 VITALS
HEART RATE: 92 BPM | HEIGHT: 70 IN | OXYGEN SATURATION: 91 % | WEIGHT: 217 LBS | BODY MASS INDEX: 31.07 KG/M2 | DIASTOLIC BLOOD PRESSURE: 88 MMHG | SYSTOLIC BLOOD PRESSURE: 126 MMHG

## 2022-05-25 DIAGNOSIS — F51.01 PRIMARY INSOMNIA: ICD-10-CM

## 2022-05-25 DIAGNOSIS — I10 BENIGN HYPERTENSION: ICD-10-CM

## 2022-05-25 DIAGNOSIS — E78.2 MIXED HYPERLIPIDEMIA: ICD-10-CM

## 2022-05-25 DIAGNOSIS — I77.810 AORTIC ROOT DILATATION (HCC): ICD-10-CM

## 2022-05-25 DIAGNOSIS — I35.1 NONRHEUMATIC AORTIC VALVE INSUFFICIENCY: ICD-10-CM

## 2022-05-25 DIAGNOSIS — D69.6 THROMBOCYTOPENIA (HCC): ICD-10-CM

## 2022-05-25 DIAGNOSIS — R53.82 CHRONIC FATIGUE: ICD-10-CM

## 2022-05-25 DIAGNOSIS — R00.1 BRADYCARDIA: Primary | ICD-10-CM

## 2022-05-25 PROBLEM — E78.49 OTHER HYPERLIPIDEMIA: Status: RESOLVED | Noted: 2021-12-14 | Resolved: 2022-05-25

## 2022-05-25 LAB
BASOPHILS ABSOLUTE: 0 K/UL (ref 0–0.2)
BASOPHILS RELATIVE PERCENT: 0.6 %
EOSINOPHILS ABSOLUTE: 0.1 K/UL (ref 0–0.6)
EOSINOPHILS RELATIVE PERCENT: 2.2 %
HCT VFR BLD CALC: 48.1 % (ref 40.5–52.5)
HEMOGLOBIN: 16.1 G/DL (ref 13.5–17.5)
LYMPHOCYTES ABSOLUTE: 1.8 K/UL (ref 1–5.1)
LYMPHOCYTES RELATIVE PERCENT: 42.4 %
MCH RBC QN AUTO: 28.6 PG (ref 26–34)
MCHC RBC AUTO-ENTMCNC: 33.5 G/DL (ref 31–36)
MCV RBC AUTO: 85.4 FL (ref 80–100)
MONOCYTES ABSOLUTE: 0.4 K/UL (ref 0–1.3)
MONOCYTES RELATIVE PERCENT: 8.9 %
NEUTROPHILS ABSOLUTE: 1.9 K/UL (ref 1.7–7.7)
NEUTROPHILS RELATIVE PERCENT: 45.9 %
PDW BLD-RTO: 13.4 % (ref 12.4–15.4)
PLATELET # BLD: 121 K/UL (ref 135–450)
PMV BLD AUTO: 7.7 FL (ref 5–10.5)
RBC # BLD: 5.63 M/UL (ref 4.2–5.9)
WBC # BLD: 4.2 K/UL (ref 4–11)

## 2022-05-25 PROCEDURE — 99214 OFFICE O/P EST MOD 30 MIN: CPT | Performed by: INTERNAL MEDICINE

## 2022-05-25 PROCEDURE — G8417 CALC BMI ABV UP PARAM F/U: HCPCS | Performed by: INTERNAL MEDICINE

## 2022-05-25 PROCEDURE — 85025 COMPLETE CBC W/AUTO DIFF WBC: CPT

## 2022-05-25 PROCEDURE — 1036F TOBACCO NON-USER: CPT | Performed by: INTERNAL MEDICINE

## 2022-05-25 PROCEDURE — 36415 COLL VENOUS BLD VENIPUNCTURE: CPT

## 2022-05-25 PROCEDURE — G8427 DOCREV CUR MEDS BY ELIG CLIN: HCPCS | Performed by: INTERNAL MEDICINE

## 2022-05-25 PROCEDURE — 93000 ELECTROCARDIOGRAM COMPLETE: CPT | Performed by: INTERNAL MEDICINE

## 2022-05-25 PROCEDURE — 3017F COLORECTAL CA SCREEN DOC REV: CPT | Performed by: INTERNAL MEDICINE

## 2022-05-25 RX ORDER — DEXTROAMPHETAMINE SACCHARATE, AMPHETAMINE ASPARTATE MONOHYDRATE, DEXTROAMPHETAMINE SULFATE AND AMPHETAMINE SULFATE 7.5; 7.5; 7.5; 7.5 MG/1; MG/1; MG/1; MG/1
30 CAPSULE, EXTENDED RELEASE ORAL EVERY MORNING
COMMUNITY
Start: 2022-05-17 | End: 2022-08-24 | Stop reason: DRUGHIGH

## 2022-05-25 RX ORDER — MIRTAZAPINE 7.5 MG/1
7.5 TABLET, FILM COATED ORAL NIGHTLY
COMMUNITY
Start: 2022-05-17

## 2022-05-25 NOTE — PATIENT INSTRUCTIONS
Your provider has ordered testing for further evaluation. An order/prescription has been included in your paper work.  To schedule outpatient testing, contact Central Scheduling by calling [facility] ([phone]). Plan:  1. EKG today  2. Continue current medications  3. Echocardiogram to assess heart.  To schedule outpatient testing, contact Central Scheduling by calling [facility] ([phone]). 4. 2 week cardiac monitor to assess bradycardia  5. Referral to electrophysiology    - Schedule new patient appointment  6. Follow up with your sleep doctor regarding insomnia   7.  Follow up in 6 months

## 2022-05-26 ENCOUNTER — TELEPHONE (OUTPATIENT)
Dept: CARDIOLOGY CLINIC | Age: 52
End: 2022-05-26

## 2022-06-06 ENCOUNTER — OFFICE VISIT (OUTPATIENT)
Dept: FAMILY MEDICINE CLINIC | Age: 52
End: 2022-06-06
Payer: MEDICAID

## 2022-06-06 VITALS
HEIGHT: 70 IN | HEART RATE: 80 BPM | DIASTOLIC BLOOD PRESSURE: 74 MMHG | SYSTOLIC BLOOD PRESSURE: 114 MMHG | WEIGHT: 216 LBS | BODY MASS INDEX: 30.92 KG/M2 | OXYGEN SATURATION: 96 %

## 2022-06-06 DIAGNOSIS — F51.01 PRIMARY INSOMNIA: ICD-10-CM

## 2022-06-06 DIAGNOSIS — F41.9 ANXIETY: ICD-10-CM

## 2022-06-06 PROCEDURE — 3017F COLORECTAL CA SCREEN DOC REV: CPT | Performed by: FAMILY MEDICINE

## 2022-06-06 PROCEDURE — 1036F TOBACCO NON-USER: CPT | Performed by: FAMILY MEDICINE

## 2022-06-06 PROCEDURE — G8427 DOCREV CUR MEDS BY ELIG CLIN: HCPCS | Performed by: FAMILY MEDICINE

## 2022-06-06 PROCEDURE — G8417 CALC BMI ABV UP PARAM F/U: HCPCS | Performed by: FAMILY MEDICINE

## 2022-06-06 PROCEDURE — 99213 OFFICE O/P EST LOW 20 MIN: CPT | Performed by: FAMILY MEDICINE

## 2022-06-06 RX ORDER — CLONAZEPAM 1 MG/1
2 TABLET ORAL NIGHTLY PRN
Qty: 60 TABLET | Refills: 2 | Status: SHIPPED | OUTPATIENT
Start: 2022-06-06 | End: 2022-08-24 | Stop reason: SDUPTHER

## 2022-06-06 RX ORDER — ALPRAZOLAM 2 MG/1
TABLET ORAL
COMMUNITY
Start: 2022-06-05 | End: 2022-08-16

## 2022-06-06 RX ORDER — PANCRELIPASE 36000; 180000; 114000 [USP'U]/1; [USP'U]/1; [USP'U]/1
CAPSULE, DELAYED RELEASE PELLETS ORAL
COMMUNITY
Start: 2022-06-05 | End: 2022-08-16

## 2022-06-06 RX ORDER — ALPRAZOLAM 2 MG/1
2 TABLET ORAL 2 TIMES DAILY PRN
Qty: 45 TABLET | Refills: 2 | Status: SHIPPED | OUTPATIENT
Start: 2022-06-06 | End: 2022-08-24 | Stop reason: SDUPTHER

## 2022-06-06 RX ORDER — NALOXEGOL OXALATE 25 MG/1
25 TABLET, FILM COATED ORAL
COMMUNITY
Start: 2022-06-05

## 2022-06-06 NOTE — PROGRESS NOTES
Kasey Clement (:  1970) is a 46 y.o. male,Established patient, here for evaluation of the following chief complaint(s):  3 Month Follow-Up and Medication Check         ASSESSMENT/PLAN:  Marcell So was seen today for 3 month follow-up and medication check. Diagnoses and all orders for this visit:    Primary insomnia  -     clonazePAM (KLONOPIN) 1 MG tablet; Take 2 tablets by mouth nightly as needed (sleep) for up to 90 days. Stable on klonopin  Anxiety  -     ALPRAZolam (XANAX) 2 MG tablet; Take 1 tablet by mouth 2 times daily as needed for Anxiety for up to 90 days. Stable on xanax   Controlled Substances Monitoring: Periodic Controlled Substance Monitoring: Possible medication side effects, risk of tolerance/dependence & alternative treatments discussed. ,No signs of potential drug abuse or diversion identified. Rohith Lewis MD)     No follow-ups on file. Subjective   SUBJECTIVE/OBJECTIVE:  HPI   Pt is a of 46 y.o. male comes in today with   Chief Complaint   Patient presents with    3 Month Follow-Up    Medication Check     Has been following with sleep doc. Family noticed a big difference being scatter brained when he was off the med. Was restarted. Takes xanax during the day for anxiety. On klonopin for sleep. Vitals:    22 1516   BP: 114/74   Site: Left Upper Arm   Position: Sitting   Cuff Size: Medium Adult   Pulse: 80   SpO2: 96%   Weight: 216 lb (98 kg)   Height: 5' 9.5\" (1.765 m)        Review of Systems       Objective   Physical Exam         An electronic signature was used to authenticate this note.     --Rohith Lewis MD

## 2022-06-08 NOTE — TELEPHONE ENCOUNTER
Spoke with patient and he states that he is not going to be able to wear it until end of July. It does not work with his schedule. I told him to call [employer] and let them know what his plan were so they did not charge him for device. Naveen AVENDANO.

## 2022-07-12 RX ORDER — FLUTICASONE PROPIONATE 50 MCG
SPRAY, SUSPENSION (ML) NASAL
Qty: 16 G | Refills: 5 | Status: SHIPPED | OUTPATIENT
Start: 2022-07-12

## 2022-07-29 PROCEDURE — 93242 EXT ECG>48HR<7D RECORDING: CPT | Performed by: INTERNAL MEDICINE

## 2022-08-03 DIAGNOSIS — E55.9 VITAMIN D DEFICIENCY: ICD-10-CM

## 2022-08-03 DIAGNOSIS — E23.0 HYPOGONADOTROPIC HYPOGONADISM (HCC): ICD-10-CM

## 2022-08-04 RX ORDER — AZELASTINE HYDROCHLORIDE 137 UG/1
SPRAY, METERED NASAL
Qty: 30 ML | Refills: 2 | Status: SHIPPED | OUTPATIENT
Start: 2022-08-04 | End: 2022-11-01 | Stop reason: SDUPTHER

## 2022-08-04 RX ORDER — ERGOCALCIFEROL 1.25 MG/1
50000 CAPSULE ORAL WEEKLY
Qty: 4 CAPSULE | Refills: 0 | Status: SHIPPED | OUTPATIENT
Start: 2022-08-04 | End: 2022-08-24 | Stop reason: SDUPTHER

## 2022-08-04 RX ORDER — TESTOSTERONE 30 MG/1.5ML
SOLUTION TOPICAL
Qty: 1 EACH | Refills: 0 | Status: SHIPPED | OUTPATIENT
Start: 2022-08-04 | End: 2022-08-24 | Stop reason: SDUPTHER

## 2022-08-04 RX ORDER — OMEGA-3-ACID ETHYL ESTERS 1 G/1
2 CAPSULE, LIQUID FILLED ORAL 2 TIMES DAILY
Qty: 120 CAPSULE | Refills: 0 | Status: SHIPPED | OUTPATIENT
Start: 2022-08-04 | End: 2022-08-24 | Stop reason: SDUPTHER

## 2022-08-04 NOTE — TELEPHONE ENCOUNTER
Medication:   Requested Prescriptions     Pending Prescriptions Disp Refills    Azelastine HCl 137 MCG/SPRAY SOLN [Pharmacy Med Name: AZELASTINE 0.1% (137 MCG) SPRY] 30 mL 2     Sig: SPRAY TWO SPRAYS IN EACH NOSTRIL TWICE DAILY        Last Filled: 5/9/2022   Last appt: 6/6/2022   Next appt: 8/24/2022

## 2022-08-05 DIAGNOSIS — E23.0 HYPOGONADOTROPIC HYPOGONADISM (HCC): ICD-10-CM

## 2022-08-05 RX ORDER — FLUTICASONE PROPIONATE 50 MCG
SPRAY, SUSPENSION (ML) NASAL
Qty: 16 G | Refills: 5 | OUTPATIENT
Start: 2022-08-05

## 2022-08-05 RX ORDER — AZELASTINE HYDROCHLORIDE 137 UG/1
SPRAY, METERED NASAL
Qty: 30 ML | Refills: 2 | OUTPATIENT
Start: 2022-08-05

## 2022-08-05 RX ORDER — TESTOSTERONE 30 MG/1.5ML
SOLUTION TOPICAL
Qty: 1 EACH | Refills: 0 | OUTPATIENT
Start: 2022-08-05 | End: 2022-09-04

## 2022-08-10 ENCOUNTER — TELEPHONE (OUTPATIENT)
Dept: ENDOCRINOLOGY | Age: 52
End: 2022-08-10

## 2022-08-10 DIAGNOSIS — E03.9 ACQUIRED HYPOTHYROIDISM: Primary | ICD-10-CM

## 2022-08-10 DIAGNOSIS — Z86.39 HISTORY OF ADRENAL INSUFFICIENCY: ICD-10-CM

## 2022-08-10 DIAGNOSIS — D35.2 PITUITARY MICROADENOMA (HCC): ICD-10-CM

## 2022-08-10 DIAGNOSIS — E23.0 HYPOGONADOTROPIC HYPOGONADISM (HCC): ICD-10-CM

## 2022-08-10 DIAGNOSIS — E55.9 VITAMIN D DEFICIENCY: ICD-10-CM

## 2022-08-10 NOTE — TELEPHONE ENCOUNTER
Pt called mhi to see how long he should wear monitor. Pt stated that he has worn it for 7 days. Pt is going to change patch tonight and complete the 14 day study.

## 2022-08-10 NOTE — TELEPHONE ENCOUNTER
Spoke w/ pt. Pt was concerned about ACTH . Informed him that the ACTH is a bloodwork, but pt states MD spoke w/ him that its a stimulation test. Pt states that in [address] [place] [address], MD stated that [facility] was only facility that did it and that it was a procedure. Pt would like a call back tmrw, preferably not today.

## 2022-08-10 NOTE — TELEPHONE ENCOUNTER
The patient is having a difficult time getting all of the tests done that Dr. Fay Bowen recommended. He said [facility] says they dont have a copy of the orders and he needs to have them faxed to [phone] - he specifically wants the ACTH order faxed to the above number. He wants a call at [phone] to review everything he needs to do before next appt.

## 2022-08-11 NOTE — TELEPHONE ENCOUNTER
I ordered new labs. Make sure to do between 8 and 9 and fasting. Cortrosyn stimulation test was ordered and should be visible for infusion center staff. I copied below. It was ordered during his previous appointment, and it is in my note assessment and plan, as well as under therapy plans. Active   CORTICOTROPIN STIMULATION TEST Outpatient Infusion Therapy 1  3/18/2022 - Present Wilbert Vieyra MD            I will print the ACTH stimulation test as well, fax as requested. I sent patient [employer] message, but he might not review it yet. Please give him a call as well.

## 2022-08-11 NOTE — PROGRESS NOTES
Davy Pardon    Age 46 y.o.    male    1970    MRN [id number]    8/19/2022  Arrival Time_____________  OR Time____________60 Min     Procedure(s):  EXCISION OF NECK, CHEST AND BACK LESIONS, POSSIBLE ADJACENT TISSUE TRANSFER                      Local    Surgeon(s):  Holden Sinha, MD       Phone [phone] (Archie)     [address] [place] [address] [place]  Cell         Work  _____________________________________________________________________  _____________________________________________________________________  _____________________________________________________________________  _____________________________________________________________________  _____________________________________________________________________    PCP _____________________________ Phone_________________     H&P__________________Bringing      Chart            Epic   DOS      Called________  EKG__________________Bringing      Chart            Epic   DOS      Called________  LAB__________________ Bringing      Chart            Epic   DOS      Called________  Cardiac Clearance_______Bringing      Chart            Epic      DOS      Called________    Cardiologist________________________ Phone___________________________    ? Protestant concerns / Waiver on Chart            [employer]________________  ? Pre-op Instructions Given [place]          _________________________________  ? Directions to Surgery Center                          _________________________________  ? Transportation Home_______________      __________________________________  ?  Crutches/Walker__________________        __________________________________    ________Pre-op Orders   _______Transcribed    _______Wt.  ________Pharmacy          _______SCD  ______VTE     ______TED Ronnald Jewels  _______  Surgery Consent    _______  Anesthesia Consent         COVID DATE______________LOCATION________________ RESULT__________

## 2022-08-16 RX ORDER — PANCRELIPASE 36000; 180000; 114000 [USP'U]/1; [USP'U]/1; [USP'U]/1
2 CAPSULE, DELAYED RELEASE PELLETS ORAL 4 TIMES DAILY
COMMUNITY

## 2022-08-16 NOTE — PROGRESS NOTES
Patient instructed to:  Bring picture ID, insurance card and proof of address  Dress comfortable  No jewelry  No makeup  No contacts  Shower evening before or morning of surgery with antibacterial soap  Can have a light breakfast the morning of surgery  Can take meds as usual the day of surgery

## 2022-08-19 ENCOUNTER — HOSPITAL ENCOUNTER (OUTPATIENT)
Age: 52
Setting detail: OUTPATIENT SURGERY
Discharge: HOME | End: 2022-08-19
Attending: SURGERY | Admitting: SURGERY
Payer: MEDICAID

## 2022-08-19 VITALS
HEIGHT: 69 IN | SYSTOLIC BLOOD PRESSURE: 145 MMHG | OXYGEN SATURATION: 94 % | WEIGHT: 205 LBS | TEMPERATURE: 98.4 F | BODY MASS INDEX: 30.36 KG/M2 | RESPIRATION RATE: 14 BRPM | HEART RATE: 81 BPM | DIASTOLIC BLOOD PRESSURE: 83 MMHG

## 2022-08-19 DIAGNOSIS — L98.9 SKIN LESION: ICD-10-CM

## 2022-08-19 PROCEDURE — 3600000015 HC SURGERY LEVEL 5 ADDTL 15MIN: Performed by: SURGERY

## 2022-08-19 PROCEDURE — 7100000011 HC PHASE II RECOVERY - ADDTL 15 MIN: Performed by: SURGERY

## 2022-08-19 PROCEDURE — 13101 CMPLX RPR TRUNK 2.6-7.5 CM: CPT | Performed by: SURGERY

## 2022-08-19 PROCEDURE — 2709999900 HC NON-CHARGEABLE SUPPLY: Performed by: SURGERY

## 2022-08-19 PROCEDURE — 99999 PR OFFICE/OUTPT VISIT,PROCEDURE ONLY: CPT | Performed by: SURGERY

## 2022-08-19 PROCEDURE — 88304 TISSUE EXAM BY PATHOLOGIST: CPT

## 2022-08-19 PROCEDURE — 11404 EXC TR-EXT B9+MARG 3.1-4 CM: CPT | Performed by: SURGERY

## 2022-08-19 PROCEDURE — 11422 EXC H-F-NK-SP B9+MARG 1.1-2: CPT | Performed by: SURGERY

## 2022-08-19 PROCEDURE — 3600000005 HC SURGERY LEVEL 5 BASE: Performed by: SURGERY

## 2022-08-19 PROCEDURE — 2500000003 HC RX 250 WO HCPCS: Performed by: SURGERY

## 2022-08-19 PROCEDURE — 7100000010 HC PHASE II RECOVERY - FIRST 15 MIN: Performed by: SURGERY

## 2022-08-19 ASSESSMENT — PAIN DESCRIPTION - DESCRIPTORS: DESCRIPTORS: SQUEEZING

## 2022-08-19 ASSESSMENT — PAIN - FUNCTIONAL ASSESSMENT: PAIN_FUNCTIONAL_ASSESSMENT: 0-10

## 2022-08-19 NOTE — DISCHARGE INSTRUCTIONS
[facility]    Clay Grace MD  [address] ([address], [address]  [phone]    Post-op Instructions  ___________________________________________    Skin Lesion Removal Instructions    The following instructions will help you know what to expect in the days following your surgery. Do not, however, hesitate to call if you have questions or concerns. Activities   You may resume your regular activity. However,  avoid vigorous activity until seen after your 1 week visit. Diet   Resume your preoperative diet as tolerated. Increasing the amount of protein and eliminating unhealthy fats can improve your wound healing and lead to an improved outcome. Special Instructions / Medications  Follow these instructions for another 2 weeks AFTER your surgery     There is absolutely NO driving while on pain medication or Valium®     Do NOT take any of the following products:   Aspirin/low-dose aspirin   Ibuprofen (Advil®, Motrin®)   Naprosyn or Naproxen (Aleve®)   Vitamin E (even small amounts in multiple vitamins)   Herbals or homeopathic medicines or green tea   Protein supplements (shakes, energy drinks)   Growth hormone   Diet pills (Meridia®, Metabolife®, etc)      TYLENOL-containing products (acetaminophen) are safe to take. (If you are not sure what products to take or avoid, call the office.)    Pain Control   You may be prescribed a narcotic pain medication for the initial postoperative period. Take only as instructed as needed for pain. As mentioned above, you can take Tylenol for your pain control, however some pain medication may have Tylenol included in the ingredients (e.g. Percocet®, Vicodin®). Make sure that you do not take more than 4 grams of Tylenol in a single day. If you have any questions, you can contact the office.     Wound Care   Keep your bandage clean and dry for 24 hours (if you have one)   After 24 hours, the bandage may be removed and you can shower. If you have flesh colored Steri-strips over your surgical area, do NOT remove them. These strips typically can be removed in the shower in 10-14 days after the procedure. Incisions without Steri-strips should have a thin application of antibiotic ointment applied twice daily until the next office visit. If sutures need to be removed, this will be performed at your first follow-up appointment    Call the office at the first sign of:   Excessive pain associated with pressure. Bleeding at the incision. Redness, drainage or odor from the incisions. Fever or chills.     Go to the ER if you feel   Shortness of breath   Chest pain    Do not hesitate to call if you have any questions or concerns

## 2022-08-19 NOTE — OP NOTE
[facility] SURGERY     OPERATIVE DICTATION    NAME: Liang Jha   MRN: [id number]  DATE: 8/19/2022     AGE: 46 y.o. LOCATION: [facility]    PREOPERATIVE DIAGNOSIS:  Neck and chest cysts     POSTOPERATIVE DIAGNOSIS:  Same. OPERATION PERFORMED: 1) Excision of posterior neck lesion  (1 x 0.5 cm)      2) Complex closure of neck (1.1 cm)      3) Excision of chest cyst lesion (3 x 0.5 cm)      4) Complex closure of chest (4 cm)     SURGEON:  Charlotte Deleon MD     ANESTHESIA:  Local     ESTIMATED BLOOD LOSS:  Minimal     DRAINS:  None     SPECIMENS: Cysts x 2     OPERATIVE INDICATIONS:  This is a 46 y.o. male who presented to the office in consultation with multiple cysts that have previously been infected. The patient desired excision and a thorough discussion regarding the risks, benefits, alternatives, outcomes, and personnel involved was performed with the patient. After all questions were answered to the patient's satisfaction, they agreed to proceed. OPERATIVE PROCEDURE:  The patient was marked in the preoperative holding area and then brought to the operating room on the eye stretcher. He was first placed in the decubitus position with his left side up and then prepped and draped in the usual sterile manner. A time-out was performed confirming the patient and the procedure to be performed. The operation commenced by infiltration of local using a 50:50 mixture of 1% lidocaine with epinephrine and 0.5% marcaine plain. An excision was then performed removing the lesion circumferentially in an elliptical manner encompassing the entirety of the cyst structure to healthy fatty tissue. The lesion was then sent to pathology after being removed using a 15 blade. Hemostasis was obtained with electrocautery. The wound was then closed by widely undermining the periphery to allow for a tension free closure.   The skin was then closed in layers using 3-0 Monocryl and 5-0 Chromic sutures. A sterile dressing was then applied. The patient was then placed in the supine position and re-prepped and draped in the usual sterile manner. I infiltrated local using a 50:50 mixture of 1% lidocaine with epinephrine and 0.5% marcaine plain. An excision was then performed removing the lesion circumferentially in an elliptical manner encompassing the entirety of the cyst structure to healthy fatty tissue above the pectoralis major mucle  The lesion was then sent to pathology after being removed using a 15 blade. Hemostasis was obtained with electrocautery. The wound was then closed by widely undermining the periphery to allow for a tension free closure. The skin was then closed in layers using 3-0 Monocryl sutures. There were no immediate complications and the patient tolerated the procedure well. At the end of the case, all counts were correct.     Laron Solomon MD

## 2022-08-19 NOTE — H&P
file    Highest education level: Not on file   Occupational History    Not on file   Tobacco Use    Smoking status: Never Smoker    Smokeless tobacco: Never Used   Vaping Use    Vaping Use: Never used   Substance and Sexual Activity    Alcohol use: No    Drug use: No    Sexual activity: Yes       Partners: Female       Comment: ; 3 children   Other Topics Concern    Not on file   Social History Narrative    Not on file      Social Determinants of Health          Financial Resource Strain: Low Risk    Difficulty of Paying Living Expenses: Not very hard   Food Insecurity: No Food Insecurity    Worried About Running Out of Food in the Last Year: Never true    Ran Out of Food in the Last Year: Never true   Transportation Needs:    Lack of Transportation (Medical): Not on file    Lack of Transportation (Non-Medical):  Not on file   Physical Activity:    Days of Exercise per Week: Not on file    Minutes of Exercise per Session: Not on file   Stress:    Feeling of Stress : Not on file   Social Connections:    Frequency of Communication with Friends and Family: Not on file    Frequency of Social Gatherings with Friends and Family: Not on file    Attends Faith Services: Not on file    Active Member of Clubs or Organizations: Not on file    Attends Club or Organization Meetings: Not on file    Marital Status: Not on file   Intimate Partner Violence:    Fear of Current or Ex-Partner: Not on file    Emotionally Abused: Not on file    Physically Abused: Not on file    Sexually Abused: Not on file   Housing Stability:    Unable to Pay for Housing in the Last Year: Not on file    Number of [place] in the Last Year: Not on file    Unstable Housing in the Last Year: Not on file         FHx: Family history of skin CA: none  Meds:   Current Facility-Administered Medications          Current Outpatient Medications   Medication Sig Dispense Refill    Biotin 63799 MCG TABS Take 10,000 mcg by mouth daily        Testosterone (AXIRON) 30 MG/ACT SOLN Place 120 mg onto the skin daily for 30 days. 2 each 3    clonazePAM (KLONOPIN) 1 MG tablet Take 2 tablets by mouth nightly as needed (sleep) for up to 90 days. 60 tablet 2    ALPRAZolam (XANAX) 2 MG tablet Take 1 tablet by mouth 2 times daily as needed for Anxiety for up to 90 days. 45 tablet 2    metroNIDAZOLE (METROCREAM) 0.75 % cream APPLY TO AFFECTED AREA(S) TWO TIMES A DAY 45 g 11    ketoconazole (NIZORAL) 2 % shampoo Apply topically daily as needed. 120 mL 11    azelastine (ASTELIN) 0.1 % nasal spray SPRAY TWO SPRAYS IN EACH NOSTRIL TWICE DAILY 30 mL 2    fluticasone (FLONASE) 50 MCG/ACT nasal spray 1SPRAY 2 SPRAYS IN EACH NOSTRIL DAILY 1 each 2    omega-3 acid ethyl esters (LOVAZA) 1 g capsule Take 2 capsules by mouth 2 times daily 360 capsule 1    vitamin D (ERGOCALCIFEROL) 1.25 MG (15713 UT) CAPS capsule Take 1 capsule by mouth once a week 12 capsule 1    triamcinolone (KENALOG) 0.1 % cream Apply topically 2 times daily. 80 g 0    Multiple Vitamin (DAILY-SHANNON) TABS TAKE ONE TABLET BY MOUTH DAILY 30 tablet 1    Naloxegol Oxalate (MOVANTIK PO) Take by mouth        oxymorphone (OPANA ER) 40 MG ER tablet Take 40 mg by mouth 4 times daily        polyethylene glycol (GLYCOLAX) packet Take 17 g by mouth daily as needed. Amylase-Lipase-Protease (CREON 20 PO) Take 20,000 mg by mouth three times daily        omeprazole (PRILOSEC) 20 MG capsule Take 20 mg by mouth Daily        oxycodone (OXY-IR) 30 MG immediate release tablet Take 30 mg by mouth 4 times daily          No current facility-administered medications for this visit. ROS   Constitutional: Negative for chills and fever. HENT: Negative for congestion, facial swelling, and voice change. Eyes: Negative for photophobia and visual disturbance. Respiratory: Negative for apnea, cough, chest tightness and shortness of breath. Cardiovascular: Negative for chest pain and palpitations.   Gastrointestinal: Negative for dysphagia and early satiety. Genitourinary: Negative for difficulty urinating, dysuria, flank pain, frequency and hematuria. Musculoskeletal: Negative for new gait problem, joint swelling and myalgias. Skin: Negative for color change, pallor and rash. Endocrine: negative for tremors, temperature intolerance or polydipsia. Allergic/Immunologic: Negative for new environmental or food allergies. Neurological: Negative for dizziness, seizures, speech difficulty, numbness. Hematological: Negative for adenopathy. Psychiatric/Behavioral: Negative for agitation and confusion. EXAM   See EPIC     Constitutional: Patient is oriented to person, place, and time. Vital signs are normal. Patient  appears well-developed and well-nourished. Patient  is active and cooperative. Non-toxic appearance. No distress. Neck: Trachea normal and normal range of motion. Neck supple. No JVD present. No tracheal tenderness present. Carotid bruit is not present. No rigidity. No tracheal deviation and no edema present. No thyromegaly present. Cardiovascular: Normal rate, regular rhythm, normal heart sounds, intact distal pulses and normal pulses. Pulmonary/Chest: Effort normal and breath sounds normal. No stridor. No respiratory distress. Patient  has no wheezes. Patient has no rales. Patient exhibits no tenderness and no crepitus. Abdominal: Soft. Normal appearance and bowel sounds are normal. Patient exhibits no distension, no abdominal bruit, no ascites and no mass. There is no hepatosplenomegaly. There is no tenderness. There is no rigidity, no rebound, no guarding and no CVA tenderness. Musculoskeletal: Normal range of motion. Patient exhibits no edema or tenderness. Neurological: Patient is alert and oriented to person, place, and time. Patient has normal strength. Coordination and gait normal. GCS eye subscore is 4. GCS verbal subscore is 5. GCS motor subscore is 6. Skin: Skin is warm and dry.  No abrasion and no rash noted. Patient  is not diaphoretic. No cyanosis or erythema. Psychiatric: Patient has a normal mood and affect. speech is normal and behavior is normal. Cognition and memory are normal.     PATHOLOGY/WORKUP: none     IMP: 51 y.o.male with multiple skin lesion. PLAN:  To OR.     Katherine Moseley MD  [address] [place] [address] Reconstructive Surgery  [phone]  08/19/22

## 2022-08-20 ENCOUNTER — HOSPITAL ENCOUNTER (OUTPATIENT)
Age: 52
Discharge: HOME | End: 2022-08-20
Payer: MEDICAID

## 2022-08-20 DIAGNOSIS — Z86.39 HISTORY OF ADRENAL INSUFFICIENCY: ICD-10-CM

## 2022-08-20 DIAGNOSIS — E03.9 ACQUIRED HYPOTHYROIDISM: ICD-10-CM

## 2022-08-20 DIAGNOSIS — E23.0 HYPOGONADOTROPIC HYPOGONADISM (HCC): ICD-10-CM

## 2022-08-20 DIAGNOSIS — E55.9 VITAMIN D DEFICIENCY: ICD-10-CM

## 2022-08-20 DIAGNOSIS — D35.2 PITUITARY MICROADENOMA (HCC): ICD-10-CM

## 2022-08-20 LAB
A/G RATIO: 1.3 (ref 1.1–2.2)
ALBUMIN SERPL-MCNC: 4.1 G/DL (ref 3.4–5)
ALP BLD-CCNC: 79 U/L (ref 40–129)
ALT SERPL-CCNC: 27 U/L (ref 10–40)
ANION GAP SERPL CALCULATED.3IONS-SCNC: 9 MMOL/L (ref 3–16)
AST SERPL-CCNC: 33 U/L (ref 15–37)
BILIRUB SERPL-MCNC: 0.7 MG/DL (ref 0–1)
BUN BLDV-MCNC: 13 MG/DL (ref 7–20)
CALCIUM SERPL-MCNC: 9.1 MG/DL (ref 8.3–10.6)
CHLORIDE BLD-SCNC: 103 MMOL/L (ref 99–110)
CO2: 28 MMOL/L (ref 21–32)
CREAT SERPL-MCNC: 0.9 MG/DL (ref 0.9–1.3)
GFR AFRICAN AMERICAN: >60
GFR NON-AFRICAN AMERICAN: >60
GLUCOSE BLD-MCNC: 105 MG/DL (ref 70–99)
HCT VFR BLD CALC: 49.2 % (ref 40.5–52.5)
HEMOGLOBIN: 16.4 G/DL (ref 13.5–17.5)
MCH RBC QN AUTO: 28.1 PG (ref 26–34)
MCHC RBC AUTO-ENTMCNC: 33.3 G/DL (ref 31–36)
MCV RBC AUTO: 84.4 FL (ref 80–100)
PDW BLD-RTO: 13.7 % (ref 12.4–15.4)
PLATELET # BLD: 118 K/UL (ref 135–450)
PMV BLD AUTO: 7.9 FL (ref 5–10.5)
POTASSIUM SERPL-SCNC: 4.2 MMOL/L (ref 3.5–5.1)
RBC # BLD: 5.83 M/UL (ref 4.2–5.9)
SODIUM BLD-SCNC: 140 MMOL/L (ref 136–145)
TOTAL PROTEIN: 7.3 G/DL (ref 6.4–8.2)
WBC # BLD: 4.8 K/UL (ref 4–11)

## 2022-08-20 PROCEDURE — 84402 ASSAY OF FREE TESTOSTERONE: CPT

## 2022-08-20 PROCEDURE — 84305 ASSAY OF SOMATOMEDIN: CPT

## 2022-08-20 PROCEDURE — 84481 FREE ASSAY (FT-3): CPT

## 2022-08-20 PROCEDURE — 84403 ASSAY OF TOTAL TESTOSTERONE: CPT

## 2022-08-20 PROCEDURE — 84443 ASSAY THYROID STIM HORMONE: CPT

## 2022-08-20 PROCEDURE — 82024 ASSAY OF ACTH: CPT

## 2022-08-20 PROCEDURE — 83001 ASSAY OF GONADOTROPIN (FSH): CPT

## 2022-08-20 PROCEDURE — 84146 ASSAY OF PROLACTIN: CPT

## 2022-08-20 PROCEDURE — 80053 COMPREHEN METABOLIC PANEL: CPT

## 2022-08-20 PROCEDURE — 36415 COLL VENOUS BLD VENIPUNCTURE: CPT

## 2022-08-20 PROCEDURE — 82533 TOTAL CORTISOL: CPT

## 2022-08-20 PROCEDURE — 83003 ASSAY GROWTH HORMONE (HGH): CPT

## 2022-08-20 PROCEDURE — 83002 ASSAY OF GONADOTROPIN (LH): CPT

## 2022-08-20 PROCEDURE — 84270 ASSAY OF SEX HORMONE GLOBUL: CPT

## 2022-08-20 PROCEDURE — 82670 ASSAY OF TOTAL ESTRADIOL: CPT

## 2022-08-20 PROCEDURE — 85027 COMPLETE CBC AUTOMATED: CPT

## 2022-08-20 PROCEDURE — 84439 ASSAY OF FREE THYROXINE: CPT

## 2022-08-21 ENCOUNTER — PATIENT MESSAGE (OUTPATIENT)
Dept: SURGERY | Age: 52
End: 2022-08-21

## 2022-08-21 ENCOUNTER — TELEPHONE (OUTPATIENT)
Dept: ENDOCRINOLOGY | Age: 52
End: 2022-08-21

## 2022-08-21 LAB
CORTISOL - AM: 2.9 UG/DL (ref 4.3–22.4)
ESTRADIOL LEVEL: 31 PG/ML
FOLLICLE STIMULATING HORMONE: <0.1 MIU/ML
LUTEINIZING HORMONE: <0.1 MIU/ML
PROLACTIN: 15.6 NG/ML
T3 FREE: 3.8 PG/ML (ref 2.3–4.2)
T4 FREE: 1.1 NG/DL (ref 0.9–1.8)
TSH SERPL DL<=0.05 MIU/L-ACNC: 4.86 UIU/ML (ref 0.27–4.2)

## 2022-08-21 NOTE — TELEPHONE ENCOUNTER
Please see my message from 8/10/2022 regarding Cortrosyn stimulation test.  I do not see results. Ask patient if he got it done. I sent him a [employer] message as well on 8/10/2022. I printed the order in addition to my orders which are already in the system for infusion center.

## 2022-08-22 NOTE — TELEPHONE ENCOUNTER
Left a message on [employer] VM. Explaining that discomfort several days after surgery (8/19) is expected and would be expected to be uncomfortable to lay on his side or stomach. Some swelling and redness is not unusual after surgery. Explained that a low grade fever is not uncommon after surgery. He has a post appointment on Friday 8/26/2022. Instructed to call the office if he would like to be seen in the office sooner.

## 2022-08-23 LAB
SEX HORMONE BINDING GLOBULIN: 53 NMOL/L (ref 19–76)
TESTOSTERONE FREE PERCENT: 1.4 % (ref 1.6–2.9)
TESTOSTERONE FREE, CALC: 52 PG/ML (ref 47–244)
TESTOSTERONE TOTAL-MALE: 378 NG/DL (ref 300–890)
TESTOSTERONE, BIOAVAILABLE: 145 NG/DL (ref 131–682)

## 2022-08-24 ENCOUNTER — OFFICE VISIT (OUTPATIENT)
Dept: FAMILY MEDICINE CLINIC | Age: 52
End: 2022-08-24
Payer: MEDICAID

## 2022-08-24 ENCOUNTER — OFFICE VISIT (OUTPATIENT)
Dept: ENDOCRINOLOGY | Age: 52
End: 2022-08-24
Payer: MEDICAID

## 2022-08-24 VITALS
BODY MASS INDEX: 32.44 KG/M2 | DIASTOLIC BLOOD PRESSURE: 82 MMHG | SYSTOLIC BLOOD PRESSURE: 138 MMHG | HEART RATE: 120 BPM | WEIGHT: 219 LBS | HEIGHT: 69 IN | OXYGEN SATURATION: 96 %

## 2022-08-24 VITALS
OXYGEN SATURATION: 95 % | HEART RATE: 105 BPM | SYSTOLIC BLOOD PRESSURE: 134 MMHG | WEIGHT: 220 LBS | TEMPERATURE: 98 F | BODY MASS INDEX: 32.58 KG/M2 | RESPIRATION RATE: 14 BRPM | DIASTOLIC BLOOD PRESSURE: 91 MMHG | HEIGHT: 69 IN

## 2022-08-24 DIAGNOSIS — F51.01 PRIMARY INSOMNIA: Primary | ICD-10-CM

## 2022-08-24 DIAGNOSIS — E55.9 VITAMIN D DEFICIENCY: ICD-10-CM

## 2022-08-24 DIAGNOSIS — F41.9 ANXIETY: ICD-10-CM

## 2022-08-24 DIAGNOSIS — E23.0 HYPOGONADOTROPIC HYPOGONADISM (HCC): Primary | ICD-10-CM

## 2022-08-24 DIAGNOSIS — Z86.39 HISTORY OF ADRENAL INSUFFICIENCY: ICD-10-CM

## 2022-08-24 DIAGNOSIS — E78.49 OTHER HYPERLIPIDEMIA: ICD-10-CM

## 2022-08-24 DIAGNOSIS — R53.83 OTHER FATIGUE: ICD-10-CM

## 2022-08-24 DIAGNOSIS — E03.9 ACQUIRED HYPOTHYROIDISM: ICD-10-CM

## 2022-08-24 DIAGNOSIS — D35.2 PITUITARY MICROADENOMA (HCC): ICD-10-CM

## 2022-08-24 DIAGNOSIS — R79.89 ELEVATED INSULIN-LIKE GROWTH FACTOR 1 (IGF-1) LEVEL: ICD-10-CM

## 2022-08-24 LAB
ADRENOCORTICOTROPIC HORMONE: 13 PG/ML (ref 7–69)
GROWTH HORMONE: 0.34 NG/ML (ref 0.05–3)
SEX HORMONE BINDING GLOBULIN: 53 NMOL/L (ref 11–80)
TESTOSTERONE FREE-NONMALE: 57.7 PG/ML (ref 47–244)
TESTOSTERONE TOTAL: 390 NG/DL (ref 220–1000)

## 2022-08-24 PROCEDURE — G8427 DOCREV CUR MEDS BY ELIG CLIN: HCPCS | Performed by: FAMILY MEDICINE

## 2022-08-24 PROCEDURE — 1036F TOBACCO NON-USER: CPT | Performed by: INTERNAL MEDICINE

## 2022-08-24 PROCEDURE — 99215 OFFICE O/P EST HI 40 MIN: CPT | Performed by: INTERNAL MEDICINE

## 2022-08-24 PROCEDURE — 1036F TOBACCO NON-USER: CPT | Performed by: FAMILY MEDICINE

## 2022-08-24 PROCEDURE — G8417 CALC BMI ABV UP PARAM F/U: HCPCS | Performed by: INTERNAL MEDICINE

## 2022-08-24 PROCEDURE — G8427 DOCREV CUR MEDS BY ELIG CLIN: HCPCS | Performed by: INTERNAL MEDICINE

## 2022-08-24 PROCEDURE — G8417 CALC BMI ABV UP PARAM F/U: HCPCS | Performed by: FAMILY MEDICINE

## 2022-08-24 PROCEDURE — 3017F COLORECTAL CA SCREEN DOC REV: CPT | Performed by: FAMILY MEDICINE

## 2022-08-24 PROCEDURE — 3017F COLORECTAL CA SCREEN DOC REV: CPT | Performed by: INTERNAL MEDICINE

## 2022-08-24 PROCEDURE — 99213 OFFICE O/P EST LOW 20 MIN: CPT | Performed by: FAMILY MEDICINE

## 2022-08-24 RX ORDER — OMEGA-3-ACID ETHYL ESTERS 1 G/1
2 CAPSULE, LIQUID FILLED ORAL 2 TIMES DAILY
Qty: 360 CAPSULE | Refills: 1 | Status: SHIPPED | OUTPATIENT
Start: 2022-08-24

## 2022-08-24 RX ORDER — ERGOCALCIFEROL 1.25 MG/1
50000 CAPSULE ORAL WEEKLY
Qty: 12 CAPSULE | Refills: 1 | Status: SHIPPED | OUTPATIENT
Start: 2022-08-24

## 2022-08-24 RX ORDER — CLONAZEPAM 1 MG/1
2 TABLET ORAL NIGHTLY PRN
Qty: 60 TABLET | Refills: 2 | Status: SHIPPED | OUTPATIENT
Start: 2022-08-24 | End: 2022-11-22

## 2022-08-24 RX ORDER — TESTOSTERONE 30 MG/1.5ML
SOLUTION TOPICAL
Qty: 2 EACH | Refills: 3 | Status: SHIPPED | OUTPATIENT
Start: 2022-08-24 | End: 2022-12-24

## 2022-08-24 RX ORDER — DEXTROAMPHETAMINE SACCHARATE, AMPHETAMINE ASPARTATE, DEXTROAMPHETAMINE SULFATE AND AMPHETAMINE SULFATE 7.5; 7.5; 7.5; 7.5 MG/1; MG/1; MG/1; MG/1
30 TABLET ORAL DAILY
COMMUNITY
Start: 2022-07-28

## 2022-08-24 RX ORDER — ALPRAZOLAM 2 MG/1
2 TABLET ORAL 2 TIMES DAILY PRN
Qty: 45 TABLET | Refills: 2 | Status: SHIPPED | OUTPATIENT
Start: 2022-08-24 | End: 2022-11-22

## 2022-08-24 NOTE — PROGRESS NOTES
Norm Arce (:  1970) is a 46 y.o. male,Established patient, here for evaluation of the following chief complaint(s):  3 Month Follow-Up, Medication Check, and Post-Op Check (Patient had multiple cysts removed - would like incision sights checked.)         ASSESSMENT/PLAN:  Julia Hwang was seen today for 3 month follow-up, medication check and post-op check. Diagnoses and all orders for this visit:      Primary insomnia  Stable on klonopin  Anxiety   Stable on xanax    Controlled Substances Monitoring: Periodic Controlled Substance Monitoring: Possible medication side effects, risk of tolerance/dependence & alternative treatments discussed., No signs of potential drug abuse or diversion identified. Elif Rosario MD)   No follow-ups on file. Subjective   SUBJECTIVE/OBJECTIVE:  HPI  Pt is a of 46 y.o. male comes in today with   Chief Complaint   Patient presents with    3 Month Follow-Up    Medication Check    Post-Op Check     Patient had multiple cysts removed - would like incision sights checked. Xanax for anxiety during the day. Klonopin for sleep. Taking as directed. No side effects   On adderall for add  Vitals:    22 1335   BP: 138/82   Site: Right Upper Arm   Position: Sitting   Cuff Size: Medium Adult   Pulse: (!) 120   SpO2: 96%   Weight: 219 lb (99.3 kg)   Height: 5' 9\" (1.753 m)      Review of Systems   Constitutional: Negative. Psychiatric/Behavioral:  Negative for dysphoric mood. Objective   Physical Exam         An electronic signature was used to authenticate this note.     --Elif Rosario MD

## 2022-08-24 NOTE — PROGRESS NOTES
SUBJECTIVE:  Robert Thomson is a 46 y.o. male who is being evaluated for hypogonadism. 1. Hypogonadotropic hypogonadism ([facility] [address]  This started in 2006. Patient was diagnosed with hypogonadism. The problem has been unchanged. Patient started medication in 2006. Currently patient is on: Caresse Pancoast. Misses  0 doses a month. In 2006 started hot flashes. Had Androgel, injections before. On Axiron 3 applications daily, recently increased to 4 applications daily. 2 years on it. Caresse Pancoast worked the best.  No decreased libido or ED on Caresse Pancoast    Was on [facility] replacement therapy. Not on it now. 2. Elevated insulin-like growth factor 1 (IGF-1) level  Elevated IGF-1  Had [facility] suppression test  Was found abnormal, he was prescribed medication, but patient did not take it initially. Later he took medication. Has hand swelling  Shoe size increased 0.5 size  Ring size increased  Had carpal tunnel surgery  Had dental changes, gaps    3. History of adrenal insufficiency   On dexamethasone small dose, then weaned off. In 2012 stopped medication  Was in [facility] for consultation  Not on medication since then. Later on dexamethasone 0.5 mg daily. Run out. He is not on dexamethasone   No problems when of dexamethasone. No hypotension, nausea, vomiting    Current complaints: fatigue, muscle weakness, loss of eyebrows. Fatigue was severe. 4. Pituitary microadenoma ([facility] [address]  In 2012 Dx with pituitary microadenoma  In 2010 had severe headaches, migraines, Dx with cluster headaches. Has Hx of pancreatitis due to gallstones. Has ongoing pancreas issues since then  Sees Dr. Julio Cesar Oliver. History of obstructive symptoms: difficulty swallowing No, changes in voice/hoarseness No.  History of radiation to patient's neck: No  Resent iodine exposure: No  Family history includes no thyroid abnormalities. Family history of thyroid cancer: No    Gained weight, then lost some when stopped steroids. Eats healthy.     5. Other arterial and venous flow voids. Other: Normal included extracranial structures. Upper cervical spine without significant    abnormality. 11   IMPRESSION:        1. Heterogeneous appearance of the pituitary without focal lesion identified, overall similar    prior. 2.  Unchanged minimal scattered cerebral white matter signal alteration, which remains    nonspecific. 3.  3 mm focus of enhancement along the left cisternal trigeminal nerve, unchanged from prior,    possibly a schwannoma. EXAMINATION:   THYROID ULTRASOUND       3/1/2017       COMPARISON:   12/20/2016       HISTORY:   ORDERING SYSTEM PROVIDED HISTORY: Abnormal thyroid scan       Chronic, subsequent       FINDINGS:   Right thyroid lobe:  4 x 1.7 x 2.2 cm       Left thyroid lobe:  4 x 1.7 x 1.6 cm       Isthmus:  0.4       Thyroid Gland:  Thyroid gland demonstrates normal echotexture and vascularity. Nodules: No thyroid nodules are present. Cervical lymphadenopathy: No abnormal lymph nodes in the imaged portions of   the neck. Impression   Unremarkable thyroid ultrasound. No nodules demonstrated. 6/12/2018  EXAM: MRI Brain with and without contrast        INDICATION: PITUITARY TUMOR. TECHNIQUE: MRI brain and pituitary without and with 20 mL of Gadavist intravenously. COMPARISON: 10/13/2016       FINDINGS:   The ventricles and extra-axial spaces are normal in size and morphology. There is minimal cerebral white matter T2/FLAIR hyperintense signal.   There is ectopia of the left cerebellar tonsil, unchanged. It remains rounded in morphology. There is no mass lesion within the sella. The stalk is midline. Previously demonstrated right hippocampal signal abnormality has resolved. There is no mass lesion, restricted diffusion or hemorrhage. Flow voids are present in the intracranial vessels. There is no abnormal enhancement.        IMPRESSION:   Unchanged minimal cerebral white matter T2/FLAIR hyperintense signal, again, nonspecific. Unchanged left cerebellar tonsil ectopia.        Report Verified by: Wood Allne MD at 6/12/2018 4:07 PM EDT             Order History          Past Medical History:   Diagnosis Date    Acid reflux     Acromegalia ([address]     Anxiety     Arthritis     Chronic pain syndrome     Chronic pancreatitis ([address]     Dyslipidemia     Elevated insulin-like growth factor 1 (IGF-1) level     Hypogonadism male     IFG (impaired fasting glucose)     Insomnia     Low serum cortisol level (HCC)     Neuropathic pain     Pituitary adenoma ([address]     Secondary adrenal insufficiency ([address]      Patient Active Problem List    Diagnosis Date Noted    Skin lesions 08/19/2022    History of adrenal insufficiency 03/17/2022    Pituitary microadenoma ([address] 03/17/2022    Acquired hypothyroidism 03/17/2022    Hypogonadotropic hypogonadism ([address] 12/14/2021    Cervicalgia 10/26/2017    Primary osteoarthritis of right shoulder 10/19/2017    Pituitary abnormality ([address] 07/05/2016    Aortic root dilatation ([address] 06/01/2016    Seborrheic dermatitis 05/17/2016    Dissection of abdominal aorta ([address] 02/25/2015    Hyperlipidemia 02/25/2015    Cubital tunnel syndrome 12/03/2014    ADHD (attention deficit hyperactivity disorder) 11/12/2014    Elevated insulin-like growth factor 1 (IGF-1) level 10/16/2014    Carpal tunnel syndrome 08/15/2014    Benign hypertension 11/06/2013    Chronic pain syndrome     Neuropathic pain     Insomnia     Environmental allergies 09/11/2013    Vitamin D deficiency 06/05/2013    Chronic pancreatitis ([address] 01/02/2013    Other fatigue 01/02/2013    Migraines 07/06/2012    Anxiety      Past Surgical History:   Procedure Laterality Date    ABDOMEN SURGERY N/A 08/19/2022    EXCISION OF NECK, CHEST performed by Nora Lockett MD at [facility]  2022    ERCP       Family History   Problem Relation Age of Onset    High Blood Pressure Mother     Lung Cancer Mother     Diabetes Neg Hx      Social History     Socioeconomic History    Marital status:      Spouse name: None    Number of children: None    Years of education: None    Highest education level: None   Tobacco Use    Smoking status: Never    Smokeless tobacco: Never   Vaping Use    Vaping Use: Never used   Substance and Sexual Activity    Alcohol use: No    Drug use: No    Sexual activity: Yes     Partners: Female     Comment: ; 3 children     Social Determinants of Health     Financial Resource Strain: Low Risk     Difficulty of Paying Living Expenses: Not very hard   Food Insecurity: No Food Insecurity    Worried About Running Out of Food in the Last Year: Never true    Ran Out of Food in the Last Year: Never true     Current Outpatient Medications   Medication Sig Dispense Refill    amphetamine-dextroamphetamine (ADDERALL) 30 MG tablet Take 30 mg by mouth daily. clonazePAM (KLONOPIN) 1 MG tablet Take 2 tablets by mouth nightly as needed (sleep) for up to 90 days. 60 tablet 2    ALPRAZolam (XANAX) 2 MG tablet Take 1 tablet by mouth 2 times daily as needed for Anxiety for up to 90 days.  45 tablet 2    Testosterone 30 MG/ACT SOLN APPLY FOUR PUMP TO THE SKIN DAILY 2 each 3    omega-3 acid ethyl esters (LOVAZA) 1 g capsule Take 2 capsules by mouth 2 times daily 360 capsule 1    vitamin D (ERGOCALCIFEROL) 1.25 MG (83812 UT) CAPS capsule Take 1 capsule by mouth once a week 12 capsule 1    lipase-protease-amylase (CREON) 08575-954468 units CPEP delayed release capsule Take 2 capsules by mouth 4 times daily      Azelastine HCl 137 MCG/SPRAY SOLN SPRAY TWO SPRAYS IN EACH NOSTRIL TWICE DAILY 30 mL 2    fluticasone (FLONASE) 50 MCG/ACT nasal spray SPRAY TWO SPRAYS IN EACH NOSTRIL ONCE DAILY 16 g 5    MOVANTIK 25 MG TABS tablet 25 mg every morning (before breakfast)      mirtazapine (REMERON) 7.5 MG tablet Take 7.5 mg by mouth nightly Biotin 14984 MCG TABS Take 10,000 mcg by mouth daily      metroNIDAZOLE (METROCREAM) 0.75 % cream APPLY TO AFFECTED AREA(S) TWO TIMES A DAY 45 g 11    ketoconazole (NIZORAL) 2 % shampoo Apply topically daily as needed. 120 mL 11    triamcinolone (KENALOG) 0.1 % cream Apply topically 2 times daily. (Patient taking differently: as needed Apply topically 2 times daily prn) 80 g 0    Multiple Vitamin (DAILY-SHANNON) TABS TAKE ONE TABLET BY MOUTH DAILY 30 tablet 1    oxymorphone (OPANA ER) 40 MG ER tablet Take 40 mg by mouth 4 times daily       polyethylene glycol (GLYCOLAX) packet Take 17 g by mouth in the morning. omeprazole (PRILOSEC) 20 MG capsule Take 20 mg by mouth Daily       oxycodone (OXY-IR) 30 MG immediate release tablet Take 30 mg by mouth every 4 hours. No current facility-administered medications for this visit.      Allergies   Allergen Reactions    Ambien [Zolpidem] Other (See Comments)     Blackout lists    Haloperidol Hallucinations    Augmentin [Amoxicillin-Pot Clavulanate] Other (See Comments)     diarrhea    Elavil [Amitriptyline] Nausea Only and Other (See Comments)     JITTERY FEELING    Neurontin [Gabapentin] Nausea Only    Topamax Nausea Only    Tramadol Other (See Comments)     Patient has been on high dose pain medincines since 2002 so tramadol doesn't help with pain at all     Family Status   Relation Name Status    Mother  Alive        HTN    Father unknown Alive    Other  (Not Specified)        No DM, DVT in family    Neg Hx  (Not Specified)       Review of Systems:  Constitutional: has fatigue, no fever, no recent weight gain, no recent weight loss, no changes in appetite  Eyes: no eye pain, no change in vision, no eye redness, no eye irritation, no double vision  Ears, nose, throat: has nasal congestion, no sore throat, no earache, no decrease in hearing, no hoarseness, no dry mouth, has sinus problems, no difficulty swallowing, no neck lumps, no dental problems, no mouth sores, no ringing in ears  Pulmonary: no shortness of breath, no wheezing, no dyspnea on exertion, no cough  Cardiovascular: no chest pain, no lower extremity edema, no orthopnea, no intermittent leg claudication, no palpitations  Gastrointestinal: has abdominal pain, no nausea, no vomiting, has diarrhea, has constipation, no dysphagia, has heartburn, no bloating  Genitourinary: no dysuria, no urinary incontinence, no urinary hesitancy, no urinary frequency, no feelings of urinary urgency, no nocturia  Musculoskeletal: no joint swelling, no joint stiffness, has joint pain, no muscle cramps, no muscle pain  Integument/Breast: no skin rashes, no skin lesions, no itching, has dry skin  Neurological: no numbness, no tingling, has weakness, no confusion, no headaches, no dizziness, no fainting, no tremors, no decrease in memory, no balance problems  Psychiatric: has anxiety, no depression, has insomnia  Hematologic/Lymphatic: no tendency for easy bleeding, no swollen lymph nodes, no tendency for easy bruising  Immunology: has seasonal allergies, no frequent infections, no frequent illnesses  Endocrine: has temperature intolerance    BP (!) 134/91   Pulse (!) 105   Temp 98 °F (36.7 °C)   Resp 14   Ht 5' 9\" (1.753 m)   Wt 220 lb (99.8 kg)   SpO2 95%   BMI 32.49 kg/m²    Wt Readings from Last 3 Encounters:   08/24/22 220 lb (99.8 kg)   08/24/22 219 lb (99.3 kg)   08/19/22 205 lb (93 kg)     Body mass index is 32.49 kg/m².     OBJECTIVE:  Constitutional: no acute distress, well appearing and well nourished  Psychiatric: oriented to person, place and time, judgement and insight and normal, recent and remote memory and intact and mood and affect are normal  Skin: skin and subcutaneous tissue is normal without mass, normal turgor  Head and Face: examination of head and face revealed no abnormalities  Eyes: no lid or conjunctival swelling, erythema or discharge, pupils are normal, equal, round, reactive to light  Ears/Nose: external inspection of ears and nose revealed no abnormalities, hearing is grossly normal  Oropharynx/Mouth/Face: lips, tongue and gums are normal with no lesions, the voice quality was normal  Neck: neck is supple and symmetric, with midline trachea and no masses, thyroid is normal  Lymphatics: normal cervical lymph nodes, normal supraclavicular nodes  Pulmonary: no increased work of breathing or signs of respiratory distress, lungs are clear to auscultation  Cardiovascular: normal heart rate and rhythm, normal S1 and S2, no murmurs and pedal pulses and 2+ bilaterally, No edema  Abdomen: abdomen is soft, non-tender with no masses  Musculoskeletal: normal gait and station and exam of the digits and nails are normal  Neurological: normal coordination and normal general cortical function      Lab Review:    Lab Results   Component Value Date/Time    WBC 4.8 08/20/2022 09:30 AM    HGB 16.4 08/20/2022 09:30 AM    HCT 49.2 08/20/2022 09:30 AM    MCV 84.4 08/20/2022 09:30 AM     08/20/2022 09:30 AM     Lab Results   Component Value Date/Time     08/20/2022 09:30 AM    K 4.2 08/20/2022 09:30 AM     08/20/2022 09:30 AM    CO2 28 08/20/2022 09:30 AM    BUN 13 08/20/2022 09:30 AM    CREATININE 0.9 08/20/2022 09:30 AM    GLUCOSE 105 08/20/2022 09:30 AM    CALCIUM 9.1 08/20/2022 09:30 AM    PROT 7.3 08/20/2022 09:30 AM    PROT 6.8 03/15/2013 08:35 AM    LABALBU 4.1 08/20/2022 09:30 AM    BILITOT 0.7 08/20/2022 09:30 AM    ALKPHOS 79 08/20/2022 09:30 AM    AST 33 08/20/2022 09:30 AM    ALT 27 08/20/2022 09:30 AM    LABGLOM >60 08/20/2022 09:30 AM    LABGLOM >60>60 05/25/2010 09:55 AM    GFRAA >60 08/20/2022 09:30 AM    GFRAA >60 03/15/2013 08:35 AM    AGRATIO 1.3 08/20/2022 09:30 AM    GLOB 2.4 05/23/2016 03:43 PM     Lab Results   Component Value Date/Time    TSHFT4 2.39 10/08/2021 04:42 PM    TSH 4.86 08/20/2022 09:30 AM    FT3 3.8 08/20/2022 09:30 AM     Lab Results   Component Value Date/Time    LABA1C 5.4 05/23/2016 09:20 PM     Lab Results   Component Value Date/Time    .3 05/23/2016 09:20 PM     Lab Results   Component Value Date/Time    CHOL 176 02/11/2022 08:36 AM     Lab Results   Component Value Date/Time    TRIG 244 02/11/2022 08:36 AM     Lab Results   Component Value Date/Time    HDL 31 02/11/2022 08:36 AM    HDL 36 05/11/2012 10:08 AM     Lab Results   Component Value Date/Time    LDLCHOLESTEROL 128 10/24/2014 03:14 PM    LDLCALC 96 02/11/2022 08:36 AM     Lab Results   Component Value Date/Time    LABVLDL 49 02/11/2022 08:36 AM    VLDL 34 08/21/2018 12:00 AM     Lab Results   Component Value Date/Time    CHOLHDLRATIO 7.4 08/21/2018 12:00 AM     No results found for: LABMICR, PCVL23WGS  Lab Results   Component Value Date/Time    VITD25 39.1 02/11/2022 08:36 AM        ASSESSMENT/PLAN:    1. Hypogonadotropic hypogonadism ([facility] [address]  Call for IGF-1, ACTH  results  Testosterone [id number]  Estradiol 47-31  Continue Axiron, recently increased to 4 applications daily. Had Androgel, injections before. On Axiron 3 applications daily, recently increased to 4 applications daily. 2 years on it. Meli Palencia worked the best.  Has some decreased libido   ED on Axiron  - Testosterone, free, total; Future  - Comprehensive Metabolic Panel; Future  - CBC Auto Differential; Future    2. Vitamin D deficiency  25 hydroxy vitamin D 39.1  - Comprehensive Metabolic Panel; Future  - Vitamin D 25 Hydroxy; Future  - vitamin D (ERGOCALCIFEROL) 1.25 MG (81528 UT) CAPS capsule; Take 1 capsule by mouth once a week  Dispense: 12 capsule; Refill: 1    3. Other fatigue  Continue monitoring  Healthy diet, exercise    4. Elevated insulin-like growth factor 1 (IGF-1) level  IGF-I 143, normal  GH 0.34  Patient was concerned about low growth hormone. Discussed that growth hormone level is fluctuating through the day. IGF-I was normal.  - Prolactin; Future  - Insulin-Like Growth Factor; Future    5.   Pituitary microadenoma  ACTH 19  Prolactin 12.6  2/18/2022   Pituitary gland: The pituitary gland is mildly heterogeneous without focal lesion, similar to    the prior exam. The infundibulum is midline and is normal in thickness. No suprasellar mass. Normal cavernous sinuses. - MRI BRAIN W WO CONTRAST; Future    6. History of adrenal insufficiency  Call for Cortrosyn stim test results  Did have side effects on steroids before  DHEA S 32.6  Cortisol 5.1  FSH 0.1  LH less than 0.1  Ordered Cortrosyn stim test.  Do test prior to starting thyroid replacement therapy. - ACTH; Future  - Cortisol AM, Total; Future    7. Other hyperlipidemia  LDL 96  Triglycerides 244  HDL 31  - Lipid Panel; Future    8. Hypothyroidism  Start levothyroxine if Cortrosyn stimulation test results are normal.  Has insomnia and anxiety, follow carefully for side effects  TSH 5.76-4.86  New problem, not treated, previously had elevated TSH as well.   TPO antibody, thyroglobulin antibody negative  Will start treatment after Cortrosyn stimulation test if normal.  -TSH  -Free T4  -Free T3      Reviewed and/or ordered clinical lab results Yes  Reviewed and/or ordered radiology tests Yes   Reviewed and/or ordered other diagnostic tests No  Discussed test results with performing physician No  Independently reviewed image, tracing, or specimen No  Made a decision to obtain old records No  Reviewed and summarized old records Yes   TSH 2.39  IGF-1 131  Obtained history from other than patient No    Dian Blanc was counseled regarding symptoms of hypogonadism, pituitary disease, adrenal insufficiency, endocrine causes of fatigue diagnosis, course and complications of disease if inadequately treated, side effects of medications, diagnosis, treatment options, and prognosis, risks, benefits, complications, and alternatives of treatment, labs, imaging and other studies and treatment targets and goals, testing for adrenal insufficiency, hypothyroidism diagnosis and treatment, thyroid hormone replacement therapy, side effects of medication, growth hormone testing, hyperlipidemia, testosterone replacement therapy. He understands instructions and counseling. Total time I spent for this encounter 40 minutes    Return in about 3 months (around 11/24/2022) for pituitary.     Electronically signed by Román Meza MD on 8/24/2022 at 3:38 PM

## 2022-08-25 LAB
IGF-1 (INSULIN-LIKE GROWTH I): 194 NG/ML (ref 62–214)
INSULIN-LIKE GROWTH FACTOR-1 Z-SCORE: 1.5

## 2022-08-26 ENCOUNTER — OFFICE VISIT (OUTPATIENT)
Dept: SURGERY | Age: 52
End: 2022-08-26

## 2022-08-26 VITALS
TEMPERATURE: 97.7 F | BODY MASS INDEX: 32.58 KG/M2 | HEIGHT: 69 IN | DIASTOLIC BLOOD PRESSURE: 86 MMHG | SYSTOLIC BLOOD PRESSURE: 129 MMHG | HEART RATE: 99 BPM | RESPIRATION RATE: 15 BRPM | WEIGHT: 220 LBS

## 2022-08-26 DIAGNOSIS — Z09 POSTOP CHECK: Primary | ICD-10-CM

## 2022-08-26 PROCEDURE — 99024 POSTOP FOLLOW-UP VISIT: CPT

## 2022-08-26 NOTE — PROGRESS NOTES
[facility]    PROCEDURE: 1) Excision of posterior neck lesion  (1 x 0.5 cm)                                                  2) Complex closure of neck (1.1 cm)                                                  3) Excision of chest cyst lesion (3 x 0.5 cm)                                                  4) Complex closure of chest (4 cm  DATE: 8/19/22    Johnathon Yu has been recovering well since his procedure. Pain has been well controlled without pain medications. EXAM  /86   Pulse 99   Temp 97.7 °F (36.5 °C) (Temporal)   Resp 15   Ht 5' 9\" (1.753 m)   Wt 220 lb (99.8 kg)   BMI 32.49 kg/m²       GEN: NAD   NECK:  Incision healing well. No hematoma/seroma. CHEST:  Incision healing well. No hematoma/seroma. PATHOLOGY: FINAL DIAGNOSIS:     A.  Skin, posterior neck, excision:   - Epidermal inclusion cyst.     B.  Skin, chest, excision:   - Epidermal inclusion cyst.     IMP: 46 y.o.male s/p excision of posterior neck lesion with closure  PLAN: Doing well. Patient happy thus far with outcomes. Will follow up as needed.          Derek Redd, APRN - [id number] [facility]  [phone]  08/26/22

## 2022-09-03 ENCOUNTER — TELEPHONE (OUTPATIENT)
Dept: ENDOCRINOLOGY | Age: 52
End: 2022-09-03

## 2022-09-08 ENCOUNTER — HOSPITAL ENCOUNTER (OUTPATIENT)
Dept: ONCOLOGY | Age: 52
Setting detail: INFUSION SERIES
Discharge: HOME | End: 2022-09-08
Payer: MEDICAID

## 2022-09-08 VITALS
RESPIRATION RATE: 16 BRPM | BODY MASS INDEX: 32 KG/M2 | OXYGEN SATURATION: 94 % | SYSTOLIC BLOOD PRESSURE: 125 MMHG | WEIGHT: 223.55 LBS | HEART RATE: 78 BPM | TEMPERATURE: 97.9 F | DIASTOLIC BLOOD PRESSURE: 75 MMHG | HEIGHT: 70 IN

## 2022-09-08 DIAGNOSIS — D35.2 PITUITARY MICROADENOMA (HCC): ICD-10-CM

## 2022-09-08 DIAGNOSIS — E23.7 PITUITARY ABNORMALITY (HCC): ICD-10-CM

## 2022-09-08 DIAGNOSIS — Z86.39 HISTORY OF ADRENAL INSUFFICIENCY: Primary | ICD-10-CM

## 2022-09-08 LAB
CORTISOL 30 MIN: 14.1 UG/DL
CORTISOL 60 MIN: 15.5 UG/DL
CORTISOL BASE: 2.3 UG/DL

## 2022-09-08 PROCEDURE — 6360000002 HC RX W HCPCS: Performed by: INTERNAL MEDICINE

## 2022-09-08 PROCEDURE — 6360000002 HC RX W HCPCS

## 2022-09-08 PROCEDURE — 96374 THER/PROPH/DIAG INJ IV PUSH: CPT

## 2022-09-08 PROCEDURE — 80400 ACTH STIMULATION PANEL: CPT

## 2022-09-08 RX ORDER — COSYNTROPIN 0.25 MG/ML
250 INJECTION, POWDER, FOR SOLUTION INTRAMUSCULAR; INTRAVENOUS ONCE
Status: DISCONTINUED | OUTPATIENT
Start: 2022-09-08 | End: 2022-09-08

## 2022-09-08 RX ORDER — COSYNTROPIN 0.25 MG/ML
250 INJECTION, POWDER, FOR SOLUTION INTRAMUSCULAR; INTRAVENOUS ONCE
Status: COMPLETED | OUTPATIENT
Start: 2022-09-08 | End: 2022-09-08

## 2022-09-08 RX ORDER — COSYNTROPIN 0.25 MG/ML
250 INJECTION, POWDER, FOR SOLUTION INTRAMUSCULAR; INTRAVENOUS ONCE
Status: CANCELLED | OUTPATIENT
Start: 2022-09-08 | End: 2022-09-08

## 2022-09-08 RX ADMIN — COSYNTROPIN 250 MCG: 0.25 INJECTION, POWDER, LYOPHILIZED, FOR SOLUTION INTRAMUSCULAR; INTRAVENOUS at 08:46

## 2022-09-08 ASSESSMENT — PAIN SCALES - GENERAL
PAINLEVEL_OUTOF10: 0
PAINLEVEL_OUTOF10: 0

## 2022-09-08 NOTE — PROGRESS NOTES
Pt arrived for ACTH stimulation test per orders from Dr. Cresencio Ormond. 250 mcg of Cosyntropin pushed over two minutes into IV. Labs drawn and sent per orders. Pt tolerated test well. Discharged ambulatory to his home.     Electronically signed by Placido Gandhi RN on 9/8/2022 at 10:02 AM

## 2022-09-08 NOTE — DISCHARGE INSTRUCTIONS
Latest Reference Range & Units 2/26/16 08:45 5/23/16 15:12 10/14/16 11:32 12/20/16 00:00 3/7/17 08:22 4/21/17 11:15 6/20/17 09:43 2/11/22 08:36 2/11/22 08:37 8/20/22 09:30   Cortisol, Base ug/dL    <0.010  7.6       CORTISOL - AM 4.3 - 22.4 ug/dL 5.8 10.9 2.4 (L)  3.1 (L)  10.7 5.1  2.9 (L)   Cortisol, 30 Min ug/dL      13.8       Cortisol, 60 Min ug/dL      15.1       ACTH 7 - 69 pg/mL 8 48 12      19 13   (L): Data is abnormally low

## 2022-09-11 ENCOUNTER — TELEPHONE (OUTPATIENT)
Dept: ENDOCRINOLOGY | Age: 52
End: 2022-09-11

## 2022-09-13 ENCOUNTER — TELEPHONE (OUTPATIENT)
Dept: CARDIOLOGY CLINIC | Age: 52
End: 2022-09-13

## 2022-09-14 NOTE — TELEPHONE ENCOUNTER
I spoke with patient in details. Informed him that Cortrosyn stimulation test was abnormal.  However, it is stable for around 8 years now. Patient stated that he had multiple testing done with Dr. Sharol Lombard regarding his elevated growth hormone and IGF-I and as well as testing regarding adrenal insufficiency. Patient stated that he gained a significant amount of weight and did not feel any better when he was on hydrocortisone and dexamethasone. He prefers monitoring at this time.   Will reach lab to clarify which testing they use for cortisol in the hospital.

## 2022-09-16 ENCOUNTER — HOSPITAL ENCOUNTER (OUTPATIENT)
Dept: ULTRASOUND IMAGING | Age: 52
Discharge: HOME | End: 2022-09-16
Payer: MEDICAID

## 2022-09-16 DIAGNOSIS — E03.9 ACQUIRED HYPOTHYROIDISM: ICD-10-CM

## 2022-09-16 PROCEDURE — 76536 US EXAM OF HEAD AND NECK: CPT

## 2022-10-06 ENCOUNTER — TELEPHONE (OUTPATIENT)
Dept: ENDOCRINOLOGY | Age: 52
End: 2022-10-06

## 2022-10-06 DIAGNOSIS — E23.0 HYPOGONADOTROPIC HYPOGONADISM (HCC): ICD-10-CM

## 2022-10-06 DIAGNOSIS — E03.9 ACQUIRED HYPOTHYROIDISM: ICD-10-CM

## 2022-10-06 DIAGNOSIS — E27.40 ADRENAL INSUFFICIENCY (HCC): Primary | ICD-10-CM

## 2022-10-06 DIAGNOSIS — R79.89 ELEVATED INSULIN-LIKE GROWTH FACTOR 1 (IGF-1) LEVEL: ICD-10-CM

## 2022-10-06 DIAGNOSIS — Z86.018 HISTORY OF PITUITARY ADENOMA: ICD-10-CM

## 2022-10-06 NOTE — TELEPHONE ENCOUNTER
Question 1)   Spoke w/ pt. In regards to the thyroid, he states he doesn't remember you guys discussing about his US Results. Please advise on US Results from 9/17/22  Did inform pt that there are no nodules and stable compared to last Thyroid US. Informed pt abn TSH last 2 blood tests, T3/T4 WNL. Pt c/o hair loss. States another MD informed he might have thyroid issue. Per LOV, states if cortosyn stim test WNL, would start thyroid tx. \"Start levothyroxine if Cortrosyn stimulation test results are normal.\"    9/8/22 Base Cortisol 2.3,       Question 2)  Pt states sleep dr put him on 25 Seroquel qHs. C/o pain, c/o insomnia. Pt states he's not depressed or has any psych issues. Pt wants to know if you have any concerns in regards to this rx or its SE. He states his other MD (Dr Mau Madden) doesn't want him on it. He wants to ask you if you have any concerns. Question 3)   He states \"big picture wise\" he does not know what Sx he should be looking for adrenal insufficiency. He is speaking specifically in to the lows. He states he has a lot of stress    Question 4)  He wants to know your opinion on medical marijuana. Any benefits. (This one is not time sensitive)    Question 5)  Asking about his active lab orders. Informed him that you add on labs and do not discontinue/cancel the old ones. He was comparing the different labs he's gone to. He states that [facility] wasn't accurate. Pt states that [facility] in his experience is accurate. For us, he has been going to [facility]. Explained in your experience [facility] is most accurate for cortisol/cortosyn. He states if he goes to [facility], it will take a little big longer. Pt aware he has to labs fasting and before 9am.   He states that he is awake for 3 hrs before getting his labwork done. He is requesting a return call from you to address his concerns further.

## 2022-10-08 NOTE — TELEPHONE ENCOUNTER
Spoke with patient and answered his questions to his satisfaction. Suboptimal cortisol response to Cortrosyn stimulation. Patient was on hydrocortisone in the past, on and off, stopped 2 years ago. When he was on hydrocortisone, he developed multiple symptoms, including weight gain, and was not able to tolerate to continue it. He stated about side effects were worse than benefits. He also tried dexamethasone per Dr. Irlanda Pineda. Refer to [facility] for consultation. Patient will let us know where to fax the order.   Thyroid ultrasound did not show thyroid nodules

## 2022-10-09 PROBLEM — Z86.018 HISTORY OF PITUITARY ADENOMA: Status: ACTIVE | Noted: 2022-10-09

## 2022-11-01 RX ORDER — AZELASTINE HYDROCHLORIDE 137 UG/1
SPRAY, METERED NASAL
Qty: 30 ML | Refills: 2 | Status: SHIPPED | OUTPATIENT
Start: 2022-11-01

## 2022-11-01 NOTE — TELEPHONE ENCOUNTER
Medication:   Requested Prescriptions     Pending Prescriptions Disp Refills    Azelastine HCl 137 MCG/SPRAY SOLN 30 mL 2     Sig: SPRAY TWO SPRAYS IN EACH NOSTRIL TWICE DAILY        Last Filled: 8/4/2022   Last appt: 8/24/2022   Next appt: 11/9/2022

## 2022-11-02 ENCOUNTER — TELEPHONE (OUTPATIENT)
Dept: CARDIOLOGY CLINIC | Age: 52
End: 2022-11-02

## 2022-11-02 RX ORDER — AZELASTINE HYDROCHLORIDE 137 UG/1
SPRAY, METERED NASAL
Qty: 30 ML | Refills: 2 | OUTPATIENT
Start: 2022-11-02

## 2022-11-02 NOTE — TELEPHONE ENCOUNTER
Pt called into office and stated he hasn't been feeling good this week, and is wanting to know if RKG still needs to see him or if RKG wants him to wait until January after he sees J[facility]. Pt stating he was supposed to do \"2 things\" before RKG appt one of which was blood work and he hasn't gotten done yet, the other \"thing\" was never mentioned again by pt. Pt kept talking in circles and is unsure if he should come in for his RKG upcoming appt. Please advise and contact pt.

## 2022-11-16 ENCOUNTER — HOSPITAL ENCOUNTER (OUTPATIENT)
Dept: GENERAL RADIOLOGY | Age: 52
Discharge: HOME | End: 2022-11-16
Payer: MEDICAID

## 2022-11-16 ENCOUNTER — OFFICE VISIT (OUTPATIENT)
Dept: FAMILY MEDICINE CLINIC | Age: 52
End: 2022-11-16
Payer: MEDICAID

## 2022-11-16 VITALS
WEIGHT: 228 LBS | OXYGEN SATURATION: 98 % | HEIGHT: 70 IN | SYSTOLIC BLOOD PRESSURE: 130 MMHG | DIASTOLIC BLOOD PRESSURE: 78 MMHG | HEART RATE: 100 BPM | BODY MASS INDEX: 32.64 KG/M2

## 2022-11-16 DIAGNOSIS — G89.29 CHRONIC PAIN OF LEFT KNEE: ICD-10-CM

## 2022-11-16 DIAGNOSIS — M25.562 CHRONIC PAIN OF LEFT KNEE: ICD-10-CM

## 2022-11-16 DIAGNOSIS — M25.562 CHRONIC PAIN OF LEFT KNEE: Primary | ICD-10-CM

## 2022-11-16 DIAGNOSIS — F51.01 PRIMARY INSOMNIA: ICD-10-CM

## 2022-11-16 DIAGNOSIS — G89.29 CHRONIC PAIN OF LEFT KNEE: Primary | ICD-10-CM

## 2022-11-16 DIAGNOSIS — F41.9 ANXIETY: ICD-10-CM

## 2022-11-16 PROCEDURE — G8427 DOCREV CUR MEDS BY ELIG CLIN: HCPCS | Performed by: FAMILY MEDICINE

## 2022-11-16 PROCEDURE — 3074F SYST BP LT 130 MM HG: CPT | Performed by: FAMILY MEDICINE

## 2022-11-16 PROCEDURE — G8417 CALC BMI ABV UP PARAM F/U: HCPCS | Performed by: FAMILY MEDICINE

## 2022-11-16 PROCEDURE — G8482 FLU IMMUNIZE ORDER/ADMIN: HCPCS | Performed by: FAMILY MEDICINE

## 2022-11-16 PROCEDURE — 3078F DIAST BP <80 MM HG: CPT | Performed by: FAMILY MEDICINE

## 2022-11-16 PROCEDURE — 73562 X-RAY EXAM OF KNEE 3: CPT

## 2022-11-16 PROCEDURE — 90471 IMMUNIZATION ADMIN: CPT | Performed by: FAMILY MEDICINE

## 2022-11-16 PROCEDURE — 99214 OFFICE O/P EST MOD 30 MIN: CPT | Performed by: FAMILY MEDICINE

## 2022-11-16 PROCEDURE — 90674 CCIIV4 VAC NO PRSV 0.5 ML IM: CPT | Performed by: FAMILY MEDICINE

## 2022-11-16 PROCEDURE — 3017F COLORECTAL CA SCREEN DOC REV: CPT | Performed by: FAMILY MEDICINE

## 2022-11-16 PROCEDURE — 1036F TOBACCO NON-USER: CPT | Performed by: FAMILY MEDICINE

## 2022-11-16 RX ORDER — QUETIAPINE FUMARATE 25 MG/1
TABLET, FILM COATED ORAL
COMMUNITY
Start: 2022-09-30 | End: 2022-11-16 | Stop reason: SINTOL

## 2022-11-16 RX ORDER — ALPRAZOLAM 2 MG/1
2 TABLET ORAL 2 TIMES DAILY PRN
Qty: 45 TABLET | Refills: 2 | Status: SHIPPED | OUTPATIENT
Start: 2022-11-16 | End: 2023-02-14

## 2022-11-16 RX ORDER — CLONAZEPAM 1 MG/1
2 TABLET ORAL NIGHTLY PRN
Qty: 60 TABLET | Refills: 2 | Status: SHIPPED | OUTPATIENT
Start: 2022-11-16 | End: 2023-02-14

## 2022-11-16 NOTE — PROGRESS NOTES
Octavio Sun (:  1970) is a 45 y.o. male,Established patient, here for evaluation of the following chief complaint(s):  3 Month Follow-Up and Medication Check         ASSESSMENT/PLAN:  Rudy Kirk was seen today for 3 month follow-up and medication check. Diagnoses and all orders for this visit:    Chronic pain of left knee  Will get xray. Referral to ortho if OA  Anxiety  -     ALPRAZolam (XANAX) 2 MG tablet; Take 1 tablet by mouth 2 times daily as needed for Anxiety for up to 90 days. Stable on xanax  Primary insomnia  -     clonazePAM (KLONOPIN) 1 MG tablet; Take 2 tablets by mouth nightly as needed (sleep) for up to 90 days. Stable on klonopin    No follow-ups on file. Controlled Substances Monitoring: Periodic Controlled Substance Monitoring: Possible medication side effects, risk of tolerance/dependence & alternative treatments discussed., No signs of potential drug abuse or diversion identified. Sima Anderson MD)     Subjective   SUBJECTIVE/OBJECTIVE:  HPI  Pt is a of 45 y.o. male comes in today with   Chief Complaint   Patient presents with    3 Month Follow-Up    Medication Check     Anxiety stable on xanax  Insomnia stable on klonopin. Still struggles. Following with sleep doc. Has tried several other meds for that. Just weaned off seroquel due to side effects     Chronic pain stable per GI   Following with endo for low T, thyroid, and adrenal insufficiency. Consult at [facility] pending     Left knee soreness after inactivity. Vitals:    22 1428   BP: 130/78   Site: Right Upper Arm   Position: Sitting   Cuff Size: Large Adult   Pulse: 100   SpO2: 98%   Weight: 228 lb (103.4 kg)   Height: 5' 9.69\" (1.77 m)      Review of Systems       Objective   Physical Exam  Constitutional:       Appearance: Normal appearance. Skin:     Capillary Refill: Capillary refill takes less than 2 seconds. Neurological:      Mental Status: He is alert.    Psychiatric:         Mood and Affect: Mood normal.         Behavior: Behavior normal.         Thought Content: Thought content normal.         Judgment: Judgment normal.            An electronic signature was used to authenticate this note.     --Ryland Del Valle MD

## 2022-11-17 ENCOUNTER — OFFICE VISIT (OUTPATIENT)
Dept: SURGERY | Age: 52
End: 2022-11-17
Payer: MEDICAID

## 2022-11-17 VITALS
SYSTOLIC BLOOD PRESSURE: 159 MMHG | TEMPERATURE: 98.9 F | HEART RATE: 99 BPM | OXYGEN SATURATION: 98 % | WEIGHT: 228 LBS | DIASTOLIC BLOOD PRESSURE: 92 MMHG | BODY MASS INDEX: 33.01 KG/M2

## 2022-11-17 DIAGNOSIS — L98.9 SKIN LESION: Primary | ICD-10-CM

## 2022-11-17 PROCEDURE — 3078F DIAST BP <80 MM HG: CPT

## 2022-11-17 PROCEDURE — G8428 CUR MEDS NOT DOCUMENT: HCPCS

## 2022-11-17 PROCEDURE — 3017F COLORECTAL CA SCREEN DOC REV: CPT

## 2022-11-17 PROCEDURE — 99213 OFFICE O/P EST LOW 20 MIN: CPT

## 2022-11-17 PROCEDURE — 3074F SYST BP LT 130 MM HG: CPT

## 2022-11-17 PROCEDURE — 1036F TOBACCO NON-USER: CPT

## 2022-11-17 PROCEDURE — G8482 FLU IMMUNIZE ORDER/ADMIN: HCPCS

## 2022-11-17 PROCEDURE — G8417 CALC BMI ABV UP PARAM F/U: HCPCS

## 2022-11-17 NOTE — PATIENT INSTRUCTIONS
Do a light scar massage with Biocorneum (purchase on amaz[employer]) twice a day, once in am and once at night. Then apply thick silicone sheet over incision at bedtime. Follow up in 3 months.    Charlene Steven

## 2022-11-17 NOTE — PROGRESS NOTES
[facility]    PROCEDURE: 1) Excision of posterior neck lesion  (1 x 0.5 cm)                                                  2) Complex closure of neck (1.1 cm)                                                  3) Excision of chest cyst lesion (3 x 0.5 cm)                                                  4) Complex closure of chest (4 cm  DATE: 8/19/22    Codey Ordonez has been recovering poorly since his procedure. Pain has been well controlled without pain medications. He presents to the office today with concerns from his surgical incision. He notes he has pain at the incision site of the back of the neck.      PMHx:   Past Medical History:   Diagnosis Date    Acid reflux     Acromegalia (HCC)     Anxiety     Arthritis     Chronic pain syndrome     Chronic pancreatitis (HCC)     Dyslipidemia     Elevated insulin-like growth factor 1 (IGF-1) level     Hypogonadism male     IFG (impaired fasting glucose)     Insomnia     Low serum cortisol level     Neuropathic pain     Pituitary adenoma ([address]     Secondary adrenal insufficiency (HCC)      PSHx:   Past Surgical History:   Procedure Laterality Date    ABDOMEN SURGERY N/A 08/19/2022    EXCISION OF NECK, CHEST performed by Caitlin Johnson MD at [place] [address]  2022    ERCP       Allergy:   Allergies   Allergen Reactions    Ambien [Zolpidem] Other (See Comments)     Blackout lists    Haloperidol Hallucinations    Augmentin [Amoxicillin-Pot Clavulanate] Other (See Comments)     diarrhea    Elavil [Amitriptyline] Nausea Only and Other (See Comments)     JITTERY FEELING    Neurontin [Gabapentin] Nausea Only    Topamax Nausea Only    Tramadol Other (See Comments)     Patient has been on high dose pain medincines since 2002 so tramadol doesn't help with pain at all       SHx:   Social History     Socioeconomic History    Marital status:      Spouse name: Not on file    Number of children: Not on file    Years of education: Not on file    Highest education level: Not on file   Occupational History    Not on file   Tobacco Use    Smoking status: Never    Smokeless tobacco: Never   Vaping Use    Vaping Use: Never used   Substance and Sexual Activity    Alcohol use: No    Drug use: No    Sexual activity: Yes     Partners: Female     Comment: ; 3 children   Other Topics Concern    Not on file   Social History Narrative    Not on file     Social Determinants of Health     Financial Resource Strain: Low Risk     Difficulty of Paying Living Expenses: Not very hard   Food Insecurity: No Food Insecurity    Worried About Running Out of Food in the Last Year: Never true    Ran Out of Food in the Last Year: Never true   Transportation Needs: Not on file   Physical Activity: Not on file   Stress: Not on file   Social Connections: Not on file   Intimate Partner Violence: Not on file   Housing Stability: Not on file     FHx: Family history reviewed and is noncontributory  Meds:   Current Outpatient Medications   Medication Sig Dispense Refill    ALPRAZolam (XANAX) 2 MG tablet Take 1 tablet by mouth 2 times daily as needed for Anxiety for up to 90 days. 45 tablet 2    clonazePAM (KLONOPIN) 1 MG tablet Take 2 tablets by mouth nightly as needed (sleep) for up to 90 days. 60 tablet 2    Azelastine HCl 137 MCG/SPRAY SOLN SPRAY TWO SPRAYS IN EACH NOSTRIL TWICE DAILY 30 mL 2    amphetamine-dextroamphetamine (ADDERALL) 30 MG tablet Take 30 mg by mouth daily.       Testosterone 30 MG/ACT SOLN APPLY FOUR PUMP TO THE SKIN DAILY 2 each 3    omega-3 acid ethyl esters (LOVAZA) 1 g capsule Take 2 capsules by mouth 2 times daily 360 capsule 1    vitamin D (ERGOCALCIFEROL) 1.25 MG (52335 UT) CAPS capsule Take 1 capsule by mouth once a week 12 capsule 1    lipase-protease-amylase (CREON) 70577-783530 units CPEP delayed release capsule Take 2 capsules by mouth 4 times daily      fluticasone (FLONASE) 50 MCG/ACT nasal spray SPRAY TWO SPRAYS IN EACH NOSTRIL ONCE DAILY 16 g 5    MOVANTIK 25 MG TABS tablet 25 mg every morning (before breakfast)      mirtazapine (REMERON) 7.5 MG tablet Take 7.5 mg by mouth nightly      Biotin 98741 MCG TABS Take 10,000 mcg by mouth daily      metroNIDAZOLE (METROCREAM) 0.75 % cream APPLY TO AFFECTED AREA(S) TWO TIMES A DAY 45 g 11    ketoconazole (NIZORAL) 2 % shampoo Apply topically daily as needed. 120 mL 11    triamcinolone (KENALOG) 0.1 % cream Apply topically 2 times daily. (Patient taking differently: as needed Apply topically 2 times daily prn) 80 g 0    Multiple Vitamin (DAILY-SHANNON) TABS TAKE ONE TABLET BY MOUTH DAILY 30 tablet 1    oxymorphone (OPANA ER) 40 MG ER tablet Take 40 mg by mouth 4 times daily       polyethylene glycol (GLYCOLAX) packet Take 17 g by mouth in the morning. omeprazole (PRILOSEC) 20 MG capsule Take 20 mg by mouth Daily       oxycodone (OXY-IR) 30 MG immediate release tablet Take 30 mg by mouth every 4 hours. No current facility-administered medications for this visit. ROS   Constitutional: Negative for chills and fever. HENT: Negative for congestion, facial swelling, and voice change. Eyes: Negative for photophobia and visual disturbance. Respiratory: Negative for apnea, cough, chest tightness and shortness of breath. Cardiovascular: Negative for chest pain and palpitations. Gastrointestinal: Negative for dysphagia and early satiety. Genitourinary: Negative for difficulty urinating, dysuria, flank pain, frequency and hematuria. Musculoskeletal: Negative for new gait problem, joint swelling and myalgias. Skin: Negative for color change, pallor and rash. Endocrine: negative for tremors, temperature intolerance or polydipsia. Allergic/Immunologic: Negative for new environmental or food allergies. Neurological: Negative for dizziness, seizures, speech difficulty, numbness. Hematological: Negative for adenopathy. Psychiatric/Behavioral: Negative for agitation and confusion. EXAM    BP (!) 159/92   Pulse 99   Temp 98.9 °F (37.2 °C)   Wt 228 lb (103.4 kg)   SpO2 98%   BMI 33.01 kg/m²       GEN: NAD   NECK:  Incision healing well. No hematoma/seroma. CHEST:  Incision healing well. No hematoma/seroma. PATHOLOGY:  FINAL DIAGNOSIS:     A.  Skin, posterior neck, excision:   - Epidermal inclusion cyst.     B.  Skin, chest, excision:   - Epidermal inclusion cyst    IMP: 45 y.o.male s/p excision of posterior neck lesion with closure. PLAN: Spent 45 minutes discussing options and patients symptoms. He is unhappy with cosmetic outcome of chest incision and feels like the neck incision becomes painful and looks irritated at times. We discussed starting to do a scar regimen of biocorneum and silicone sheets to incision sites and give this additional time to heal. We will see him back in 3 months to see how things have healed and discussed additional options at this time.       Reyna Ron, APRN - [id number] [facility]  [phone]  11/17/22

## 2022-11-21 NOTE — TELEPHONE ENCOUNTER
Spoke with patient   We are getting monitor results  Lissett Zuluaga a message  He is getting Echo that was ordered  He is following up with JAYYB for bradycardia

## 2022-11-23 DIAGNOSIS — R00.1 BRADYCARDIA: ICD-10-CM

## 2022-11-23 NOTE — TELEPHONE ENCOUNTER
I recommend he keep that appointment with electrophysiologist to get his opinion on heart rhythm issues.

## 2022-11-25 ENCOUNTER — TELEPHONE (OUTPATIENT)
Dept: CARDIOLOGY CLINIC | Age: 52
End: 2022-11-25

## 2022-11-25 NOTE — TELEPHONE ENCOUNTER
----- Message from Jacki Wilhelm MD sent at 11/23/2022  5:01 PM EST -----  No significant cardiac arrhythmia on his monitor test.

## 2022-11-26 ENCOUNTER — HOSPITAL ENCOUNTER (OUTPATIENT)
Age: 52
Discharge: HOME | End: 2022-11-26
Payer: MEDICAID

## 2022-11-26 DIAGNOSIS — E55.9 VITAMIN D DEFICIENCY: ICD-10-CM

## 2022-11-26 DIAGNOSIS — R79.89 ELEVATED INSULIN-LIKE GROWTH FACTOR 1 (IGF-1) LEVEL: ICD-10-CM

## 2022-11-26 DIAGNOSIS — R53.83 OTHER FATIGUE: ICD-10-CM

## 2022-11-26 DIAGNOSIS — Z86.39 HISTORY OF ADRENAL INSUFFICIENCY: ICD-10-CM

## 2022-11-26 DIAGNOSIS — D35.2 PITUITARY MICROADENOMA (HCC): ICD-10-CM

## 2022-11-26 DIAGNOSIS — E23.0 HYPOGONADOTROPIC HYPOGONADISM (HCC): ICD-10-CM

## 2022-11-26 DIAGNOSIS — E78.49 OTHER HYPERLIPIDEMIA: ICD-10-CM

## 2022-11-26 DIAGNOSIS — E03.9 ACQUIRED HYPOTHYROIDISM: ICD-10-CM

## 2022-11-26 LAB
A/G RATIO: 1.6 (ref 1.1–2.2)
ALBUMIN SERPL-MCNC: 4.3 G/DL (ref 3.4–5)
ALP BLD-CCNC: 79 U/L (ref 40–129)
ALT SERPL-CCNC: 30 U/L (ref 10–40)
ANION GAP SERPL CALCULATED.3IONS-SCNC: 12 MMOL/L (ref 3–16)
AST SERPL-CCNC: 28 U/L (ref 15–37)
BILIRUB SERPL-MCNC: 0.7 MG/DL (ref 0–1)
BUN BLDV-MCNC: 11 MG/DL (ref 7–20)
CALCIUM SERPL-MCNC: 9.1 MG/DL (ref 8.3–10.6)
CHLORIDE BLD-SCNC: 101 MMOL/L (ref 99–110)
CO2: 26 MMOL/L (ref 21–32)
CREAT SERPL-MCNC: 0.8 MG/DL (ref 0.9–1.3)
GFR SERPL CREATININE-BSD FRML MDRD: >60 ML/MIN/{1.73_M2}
GLUCOSE BLD-MCNC: 92 MG/DL (ref 70–99)
POTASSIUM SERPL-SCNC: 4.5 MMOL/L (ref 3.5–5.1)
PROLACTIN: 8.3 NG/ML
SODIUM BLD-SCNC: 139 MMOL/L (ref 136–145)
T3 FREE: 4 PG/ML (ref 2.3–4.2)
T4 FREE: 1.2 NG/DL (ref 0.9–1.8)
TOTAL PROTEIN: 7 G/DL (ref 6.4–8.2)
TSH SERPL DL<=0.05 MIU/L-ACNC: 3.53 UIU/ML (ref 0.27–4.2)
VITAMIN D 25-HYDROXY: 38.3 NG/ML

## 2022-11-26 PROCEDURE — 84403 ASSAY OF TOTAL TESTOSTERONE: CPT

## 2022-11-26 PROCEDURE — 84402 ASSAY OF FREE TESTOSTERONE: CPT

## 2022-11-26 PROCEDURE — 84270 ASSAY OF SEX HORMONE GLOBUL: CPT

## 2022-11-26 PROCEDURE — 36415 COLL VENOUS BLD VENIPUNCTURE: CPT

## 2022-11-26 PROCEDURE — 84481 FREE ASSAY (FT-3): CPT

## 2022-11-26 PROCEDURE — 84439 ASSAY OF FREE THYROXINE: CPT

## 2022-11-26 PROCEDURE — 84305 ASSAY OF SOMATOMEDIN: CPT

## 2022-11-26 PROCEDURE — 84146 ASSAY OF PROLACTIN: CPT

## 2022-11-26 PROCEDURE — 80053 COMPREHEN METABOLIC PANEL: CPT

## 2022-11-26 PROCEDURE — 84443 ASSAY THYROID STIM HORMONE: CPT

## 2022-11-26 PROCEDURE — 82306 VITAMIN D 25 HYDROXY: CPT

## 2022-11-27 LAB
IGF-1 (INSULIN-LIKE GROWTH I): 120 NG/ML (ref 62–214)
INSULIN-LIKE GROWTH FACTOR-1 Z-SCORE: -0.1
SEX HORMONE BINDING GLOBULIN: 49 NMOL/L (ref 19–76)
TESTOSTERONE FREE PERCENT: 1.5 % (ref 1.6–2.9)
TESTOSTERONE FREE, CALC: 65 PG/ML (ref 47–244)
TESTOSTERONE TOTAL-MALE: 439 NG/DL (ref 300–890)
TESTOSTERONE, BIOAVAILABLE: 178 NG/DL (ref 131–682)

## 2022-11-28 DIAGNOSIS — R00.1 BRADYCARDIA: Primary | ICD-10-CM

## 2022-11-29 ENCOUNTER — TELEPHONE (OUTPATIENT)
Dept: FAMILY MEDICINE CLINIC | Age: 52
End: 2022-11-29

## 2022-11-29 NOTE — TELEPHONE ENCOUNTER
Pt called requesting to speak to [facility] regarding the My Chart message he sent. I tried to get information but pt only wants to speak to [facility] or Dr. Macario Metz. Please call and advise pt tomorrow morning, if possible.

## 2022-12-14 ENCOUNTER — OFFICE VISIT (OUTPATIENT)
Dept: ENDOCRINOLOGY | Age: 52
End: 2022-12-14
Payer: MEDICAID

## 2022-12-14 VITALS
WEIGHT: 225 LBS | DIASTOLIC BLOOD PRESSURE: 82 MMHG | OXYGEN SATURATION: 96 % | RESPIRATION RATE: 14 BRPM | HEIGHT: 69 IN | HEART RATE: 76 BPM | TEMPERATURE: 98 F | BODY MASS INDEX: 33.33 KG/M2 | SYSTOLIC BLOOD PRESSURE: 131 MMHG

## 2022-12-14 DIAGNOSIS — E03.9 ACQUIRED HYPOTHYROIDISM: ICD-10-CM

## 2022-12-14 DIAGNOSIS — Z86.018 HISTORY OF PITUITARY ADENOMA: ICD-10-CM

## 2022-12-14 DIAGNOSIS — D35.2 PITUITARY MICROADENOMA (HCC): ICD-10-CM

## 2022-12-14 DIAGNOSIS — E55.9 VITAMIN D DEFICIENCY: ICD-10-CM

## 2022-12-14 DIAGNOSIS — Z86.39 HISTORY OF ADRENAL INSUFFICIENCY: ICD-10-CM

## 2022-12-14 DIAGNOSIS — R53.83 OTHER FATIGUE: ICD-10-CM

## 2022-12-14 DIAGNOSIS — R79.89 ELEVATED INSULIN-LIKE GROWTH FACTOR 1 (IGF-1) LEVEL: ICD-10-CM

## 2022-12-14 DIAGNOSIS — E27.40 ADRENAL INSUFFICIENCY (HCC): ICD-10-CM

## 2022-12-14 DIAGNOSIS — E23.0 HYPOGONADOTROPIC HYPOGONADISM (HCC): Primary | ICD-10-CM

## 2022-12-14 DIAGNOSIS — E78.49 OTHER HYPERLIPIDEMIA: ICD-10-CM

## 2022-12-14 PROCEDURE — G8417 CALC BMI ABV UP PARAM F/U: HCPCS | Performed by: INTERNAL MEDICINE

## 2022-12-14 PROCEDURE — 3078F DIAST BP <80 MM HG: CPT | Performed by: INTERNAL MEDICINE

## 2022-12-14 PROCEDURE — G8482 FLU IMMUNIZE ORDER/ADMIN: HCPCS | Performed by: INTERNAL MEDICINE

## 2022-12-14 PROCEDURE — 99215 OFFICE O/P EST HI 40 MIN: CPT | Performed by: INTERNAL MEDICINE

## 2022-12-14 PROCEDURE — 1036F TOBACCO NON-USER: CPT | Performed by: INTERNAL MEDICINE

## 2022-12-14 PROCEDURE — 3074F SYST BP LT 130 MM HG: CPT | Performed by: INTERNAL MEDICINE

## 2022-12-14 PROCEDURE — 3017F COLORECTAL CA SCREEN DOC REV: CPT | Performed by: INTERNAL MEDICINE

## 2022-12-14 PROCEDURE — G8427 DOCREV CUR MEDS BY ELIG CLIN: HCPCS | Performed by: INTERNAL MEDICINE

## 2022-12-14 RX ORDER — FAMOTIDINE 20 MG/1
20 TABLET, FILM COATED ORAL
COMMUNITY

## 2022-12-14 RX ORDER — TESTOSTERONE 30 MG/1.5ML
SOLUTION TOPICAL
Qty: 2 EACH | Refills: 3 | Status: SHIPPED | OUTPATIENT
Start: 2022-12-14 | End: 2023-04-15

## 2022-12-14 NOTE — PROGRESS NOTES
SUBJECTIVE:  Devan Solano is a 45 y.o. male who is being evaluated for hypogonadism. 1. Hypogonadotropic hypogonadism ([address]  This started in 2006. Patient was diagnosed with hypogonadism. The problem has been unchanged. Patient started medication in 2006. Currently patient is on: Beto Poor. Misses  0 doses a month. In 2006 started hot flashes. Had Androgel, injections before. On Axiron 3 applications daily, recently increased to 4 applications daily. 2 years on it. Beto Anderson worked the best.  No decreased libido or ED on Beto Poor    Was on [facility] replacement therapy. Not on it now. 2. Elevated insulin-like growth factor 1 (IGF-1) level  Elevated IGF-1  Had [facility] suppression test  Was found abnormal, he was prescribed medication, but patient did not take it initially. Later he took medication. Has hand swelling  Shoe size increased 0.5 size  Ring size increased  Had carpal tunnel surgery  Had dental changes, gaps    3. History of adrenal insufficiency   On dexamethasone small dose, then weaned off. In 2012 stopped medication  Was in [facility] for consultation  Not on medication since then. Later on dexamethasone 0.5 mg daily. Run out. He is not on dexamethasone   No problems when of dexamethasone. No hypotension, nausea, vomiting    Current complaints: fatigue, muscle weakness, loss of eyebrows. Fatigue was severe. 4. Pituitary microadenoma ([address]  In 2012 Dx with pituitary microadenoma  In 2010 had severe headaches, migraines, Dx with cluster headaches. Has Hx of pancreatitis due to gallstones. Has ongoing pancreas issues since then  Sees Dr. Ashu Preston. History of obstructive symptoms: difficulty swallowing No, changes in voice/hoarseness No.  History of radiation to patient's neck: No  Resent iodine exposure: No  Family history includes no thyroid abnormalities. Family history of thyroid cancer: No    Gained weight, then lost some when stopped steroids. Eats healthy.     5. Other hyperlipidemia  Hyperlipidemia  No muscle pain    6. Vitamin D deficiency  Has fatigue    7. Other fatigue  Has severe fatigue    8. Hypothyroidism  Has severe fatigue    EXAM: MRI BRAIN W WO CONTRAST       INDICATION: Chronic fatigue. Concern for pituitary lesion. TECHNIQUE:  MRI brain performed including multiplanar T1 and T2 weighted sequences, including    thin section images focused for evaluation of the pituitary gland. Imaging was obtained prior    to and after 11 mL of GADOBUTROL 1 MMOL/ML INTRAVENOUS SYRINGE ([facility]) administered    intravenously. COMPARISON: Multiple priors with latest MRI of brain dated 6/12/2018. FINDINGS:       Adequate diagnostic quality. Pituitary gland: The pituitary gland is mildly heterogeneous without focal lesion, similar to    the prior exam. The infundibulum is midline and is normal in thickness. No suprasellar mass. Normal cavernous sinuses. Brain parenchyma: No diffusion restriction. Minimal small scattered foci of T2/FLAIR signal    hyperintensity throughout the subcortical and periventricular bifrontal lobes, unchanged. There    is a 3 mm focus of enhancement along the mid cisternal segment of the left trigeminal nerve    (series 12 image 60), unchanged from prior study. No definite signal abnormality is identified    on the T2 weighted acquisition, however partially limited given the slice thickness. Remaining    components of the left trigeminal nerve otherwise demonstrate normal signal and morphology. Mild ectopia the left cerebellar tonsil with rounded configuration, similar to the prior exam.       Ventricles and extraaxial spaces: Normal ventricular size and position. No extra-axial fluid    collection. Marrow signal of calvarium and skull base: Normal.       Orbits, paranasal sinuses, and mastoid regions: No acute orbital abnormality. Clear paranasal    sinuses. Aerated mastoid air cells.        Vascular structures: Normal arterial and venous flow voids. Other: Normal included extracranial structures. Upper cervical spine without significant    abnormality. 11   IMPRESSION:        1. Heterogeneous appearance of the pituitary without focal lesion identified, overall similar    prior. 2.  Unchanged minimal scattered cerebral white matter signal alteration, which remains    nonspecific. 3.  3 mm focus of enhancement along the left cisternal trigeminal nerve, unchanged from prior,    possibly a schwannoma. EXAMINATION:   THYROID ULTRASOUND       3/1/2017       COMPARISON:   12/20/2016       HISTORY:   ORDERING SYSTEM PROVIDED HISTORY: Abnormal thyroid scan       Chronic, subsequent       FINDINGS:   Right thyroid lobe:  4 x 1.7 x 2.2 cm       Left thyroid lobe:  4 x 1.7 x 1.6 cm       Isthmus:  0.4       Thyroid Gland:  Thyroid gland demonstrates normal echotexture and vascularity. Nodules: No thyroid nodules are present. Cervical lymphadenopathy: No abnormal lymph nodes in the imaged portions of   the neck. Impression   Unremarkable thyroid ultrasound. No nodules demonstrated. 6/12/2018  EXAM: MRI Brain with and without contrast        INDICATION: PITUITARY TUMOR. TECHNIQUE: MRI brain and pituitary without and with 20 mL of Gadavist intravenously. COMPARISON: 10/13/2016       FINDINGS:   The ventricles and extra-axial spaces are normal in size and morphology. There is minimal cerebral white matter T2/FLAIR hyperintense signal.   There is ectopia of the left cerebellar tonsil, unchanged. It remains rounded in morphology. There is no mass lesion within the sella. The stalk is midline. Previously demonstrated right hippocampal signal abnormality has resolved. There is no mass lesion, restricted diffusion or hemorrhage. Flow voids are present in the intracranial vessels. There is no abnormal enhancement.        IMPRESSION:   Unchanged minimal cerebral white matter T2/FLAIR hyperintense signal, again, nonspecific. Unchanged left cerebellar tonsil ectopia.        Report Verified by: Annalise Wright MD at 6/12/2018 4:07 PM EDT             Order History          Past Medical History:   Diagnosis Date    Acid reflux     Acromegalia ([address]     Anxiety     Arthritis     Chronic pain syndrome     Chronic pancreatitis ([address]     Dyslipidemia     Elevated insulin-like growth factor 1 (IGF-1) level     Hypogonadism male     IFG (impaired fasting glucose)     Insomnia     Low serum cortisol level     Neuropathic pain     Pituitary adenoma ([address]     Secondary adrenal insufficiency ([address]      Patient Active Problem List    Diagnosis Date Noted    History of pituitary adenoma 10/09/2022    Skin lesions 08/19/2022    History of adrenal insufficiency 03/17/2022    Pituitary microadenoma ([address] 03/17/2022    Acquired hypothyroidism 03/17/2022    Hypogonadotropic hypogonadism ([address] 12/14/2021    Cervicalgia 10/26/2017    Primary osteoarthritis of right shoulder 10/19/2017    Pituitary abnormality ([address] 07/05/2016    Aortic root dilatation ([address] 06/01/2016    Seborrheic dermatitis 05/17/2016    Dissection of abdominal aorta ([address] 02/25/2015    Hyperlipidemia 02/25/2015    Cubital tunnel syndrome 12/03/2014    ADHD (attention deficit hyperactivity disorder) 11/12/2014    Elevated insulin-like growth factor 1 (IGF-1) level 10/16/2014    Carpal tunnel syndrome 08/15/2014    Benign hypertension 11/06/2013    Chronic pain syndrome     Neuropathic pain     Insomnia     Environmental allergies 09/11/2013    Vitamin D deficiency 06/05/2013    Chronic pancreatitis ([address] 01/02/2013    Other fatigue 01/02/2013    Migraines 07/06/2012    Anxiety      Past Surgical History:   Procedure Laterality Date    ABDOMEN SURGERY N/A 08/19/2022    EXCISION OF NECK, CHEST performed by Aparna Burch MD at [place] [address]  2022    ERCP Family History   Problem Relation Age of Onset    High Blood Pressure Mother     Lung Cancer Mother     Diabetes Neg Hx      Social History     Socioeconomic History    Marital status:      Spouse name: None    Number of children: None    Years of education: None    Highest education level: None   Tobacco Use    Smoking status: Never    Smokeless tobacco: Never   Vaping Use    Vaping Use: Never used   Substance and Sexual Activity    Alcohol use: No    Drug use: No    Sexual activity: Yes     Partners: Female     Comment: ; 3 children     Social Determinants of Health     Financial Resource Strain: Low Risk     Difficulty of Paying Living Expenses: Not very hard   Food Insecurity: No Food Insecurity    Worried About Running Out of Food in the Last Year: Never true    Ran Out of Food in the Last Year: Never true     Current Outpatient Medications   Medication Sig Dispense Refill    famotidine (PEPCID) 20 MG tablet Take 20 mg by mouth nightly      ALPRAZolam (XANAX) 2 MG tablet Take 1 tablet by mouth 2 times daily as needed for Anxiety for up to 90 days. 45 tablet 2    clonazePAM (KLONOPIN) 1 MG tablet Take 2 tablets by mouth nightly as needed (sleep) for up to 90 days. 60 tablet 2    Azelastine HCl 137 MCG/SPRAY SOLN SPRAY TWO SPRAYS IN EACH NOSTRIL TWICE DAILY 30 mL 2    amphetamine-dextroamphetamine (ADDERALL) 30 MG tablet Take 30 mg by mouth 2 times daily.       Testosterone 30 MG/ACT SOLN APPLY FOUR PUMP TO THE SKIN DAILY 2 each 3    omega-3 acid ethyl esters (LOVAZA) 1 g capsule Take 2 capsules by mouth 2 times daily 360 capsule 1    vitamin D (ERGOCALCIFEROL) 1.25 MG (75441 UT) CAPS capsule Take 1 capsule by mouth once a week 12 capsule 1    lipase-protease-amylase (CREON) 48953-790091 units CPEP delayed release capsule Take 2 capsules by mouth 4 times daily      fluticasone (FLONASE) 50 MCG/ACT nasal spray SPRAY TWO SPRAYS IN EACH NOSTRIL ONCE DAILY 16 g 5    MOVANTIK 25 MG TABS tablet 25 mg every morning (before breakfast)      Biotin 62585 MCG TABS Take 10,000 mcg by mouth daily      metroNIDAZOLE (METROCREAM) 0.75 % cream APPLY TO AFFECTED AREA(S) TWO TIMES A DAY 45 g 11    ketoconazole (NIZORAL) 2 % shampoo Apply topically daily as needed. 120 mL 11    triamcinolone (KENALOG) 0.1 % cream Apply topically 2 times daily. (Patient taking differently: as needed Apply topically 2 times daily prn) 80 g 0    Multiple Vitamin (DAILY-SHANNON) TABS TAKE ONE TABLET BY MOUTH DAILY 30 tablet 1    oxymorphone (OPANA ER) 40 MG ER tablet Take 40 mg by mouth 4 times daily       polyethylene glycol (GLYCOLAX) packet Take 17 g by mouth in the morning. omeprazole (PRILOSEC) 20 MG capsule Take 20 mg by mouth Daily       oxycodone (OXY-IR) 30 MG immediate release tablet Take 30 mg by mouth every 4 hours. mirtazapine (REMERON) 7.5 MG tablet Take 7.5 mg by mouth nightly (Patient not taking: Reported on 12/14/2022)       No current facility-administered medications for this visit.      Allergies   Allergen Reactions    Ambien [Zolpidem] Other (See Comments)     Blackout lists    Haloperidol Hallucinations    Trazodone And Nefazodone     Augmentin [Amoxicillin-Pot Clavulanate] Other (See Comments)     diarrhea    Elavil [Amitriptyline] Nausea Only and Other (See Comments)     JITTERY FEELING    Neurontin [Gabapentin] Nausea Only    Topamax Nausea Only    Tramadol Other (See Comments)     Patient has been on high dose pain medincines since 2002 so tramadol doesn't help with pain at all     Family Status   Relation Name Status    Mother  Alive        HTN    Father unknown Alive    Other  (Not Specified)        No DM, DVT in family    Neg Hx  (Not Specified)       Review of Systems:  Constitutional: has fatigue, no fever, no recent weight gain, no recent weight loss, no changes in appetite  Eyes: no eye pain, no change in vision, no eye redness, no eye irritation, no double vision  Ears, nose, throat: has nasal congestion, no sore throat, no earache, no decrease in hearing, no hoarseness, no dry mouth, has sinus problems, no difficulty swallowing, no neck lumps, no dental problems, no mouth sores, no ringing in ears  Pulmonary: no shortness of breath, no wheezing, no dyspnea on exertion, no cough  Cardiovascular: no chest pain, no lower extremity edema, no orthopnea, no intermittent leg claudication, no palpitations  Gastrointestinal: has abdominal pain, no nausea, no vomiting, has diarrhea, has constipation, no dysphagia, has heartburn, no bloating  Genitourinary: no dysuria, no urinary incontinence, no urinary hesitancy, no urinary frequency, no feelings of urinary urgency, no nocturia  Musculoskeletal: no joint swelling, no joint stiffness, has joint pain, no muscle cramps, no muscle pain  Integument/Breast: no skin rashes, no skin lesions, no itching, has dry skin  Neurological: no numbness, no tingling, has weakness, no confusion, no headaches, no dizziness, no fainting, no tremors, no decrease in memory, no balance problems  Psychiatric: has anxiety, no depression, has insomnia  Hematologic/Lymphatic: no tendency for easy bleeding, no swollen lymph nodes, no tendency for easy bruising  Immunology: has seasonal allergies, no frequent infections, no frequent illnesses  Endocrine: has temperature intolerance    /82   Pulse 76   Temp 98 °F (36.7 °C)   Resp 14   Ht 5' 9\" (1.753 m)   Wt 225 lb (102.1 kg)   SpO2 96%   BMI 33.23 kg/m²    Wt Readings from Last 3 Encounters:   12/14/22 225 lb (102.1 kg)   11/17/22 228 lb (103.4 kg)   11/16/22 228 lb (103.4 kg)     Body mass index is 33.23 kg/m².     OBJECTIVE:  Constitutional: no acute distress, well appearing and well nourished  Psychiatric: oriented to person, place and time, judgement and insight and normal, recent and remote memory and intact and mood and affect are normal  Skin: skin and subcutaneous tissue is normal without mass, normal turgor  Head and Face: examination of head and face revealed no abnormalities  Eyes: no lid or conjunctival swelling, erythema or discharge, pupils are normal, equal, round, reactive to light  Ears/Nose: external inspection of ears and nose revealed no abnormalities, hearing is grossly normal  Oropharynx/Mouth/Face: lips, tongue and gums are normal with no lesions, the voice quality was normal  Neck: neck is supple and symmetric, with midline trachea and no masses, thyroid is normal  Lymphatics: normal cervical lymph nodes, normal supraclavicular nodes  Pulmonary: no increased work of breathing or signs of respiratory distress, lungs are clear to auscultation  Cardiovascular: normal heart rate and rhythm, normal S1 and S2, no murmurs and pedal pulses and 2+ bilaterally, No edema  Abdomen: abdomen is soft, non-tender with no masses  Musculoskeletal: normal gait and station and exam of the digits and nails are normal  Neurological: normal coordination and normal general cortical function      Lab Review:    Lab Results   Component Value Date/Time    WBC 4.8 08/20/2022 09:30 AM    HGB 16.4 08/20/2022 09:30 AM    HCT 49.2 08/20/2022 09:30 AM    MCV 84.4 08/20/2022 09:30 AM     08/20/2022 09:30 AM     Lab Results   Component Value Date/Time     11/26/2022 10:33 AM    K 4.5 11/26/2022 10:33 AM     11/26/2022 10:33 AM    CO2 26 11/26/2022 10:33 AM    BUN 11 11/26/2022 10:33 AM    CREATININE 0.8 11/26/2022 10:33 AM    GLUCOSE 92 11/26/2022 10:33 AM    CALCIUM 9.1 11/26/2022 10:33 AM    PROT 7.0 11/26/2022 10:33 AM    PROT 6.8 03/15/2013 08:35 AM    LABALBU 4.3 11/26/2022 10:33 AM    BILITOT 0.7 11/26/2022 10:33 AM    ALKPHOS 79 11/26/2022 10:33 AM    AST 28 11/26/2022 10:33 AM    ALT 30 11/26/2022 10:33 AM    LABGLOM >60 11/26/2022 10:33 AM    GFRAA >60 08/20/2022 09:30 AM    GFRAA >60 03/15/2013 08:35 AM    AGRATIO 1.6 11/26/2022 10:33 AM    GLOB 2.4 05/23/2016 03:43 PM     Lab Results   Component Value Date/Time    TSHFT4 2.39 10/08/2021 04:42 PM    TSH 3.53 11/26/2022 10:33 AM    FT3 4.0 11/26/2022 10:33 AM     Lab Results   Component Value Date/Time    LABA1C 5.4 05/23/2016 09:20 PM     Lab Results   Component Value Date/Time    .3 05/23/2016 09:20 PM     Lab Results   Component Value Date/Time    CHOL 176 02/11/2022 08:36 AM     Lab Results   Component Value Date/Time    TRIG 244 02/11/2022 08:36 AM     Lab Results   Component Value Date/Time    HDL 31 02/11/2022 08:36 AM    HDL 36 05/11/2012 10:08 AM     Lab Results   Component Value Date/Time    LDLCHOLESTEROL 128 10/24/2014 03:14 PM    LDLCALC 96 02/11/2022 08:36 AM     Lab Results   Component Value Date/Time    LABVLDL 49 02/11/2022 08:36 AM    VLDL 34 08/21/2018 12:00 AM     Lab Results   Component Value Date/Time    CHOLHDLRATIO 7.4 08/21/2018 12:00 AM     No results found for: LABMICR, [id number]  Lab Results   Component Value Date/Time    VITD25 38.3 11/26/2022 10:33 AM        ASSESSMENT/PLAN:    1. Hypogonadotropic hypogonadism ([facility]  Call for IGF-1, ACTH  results  Testosterone [phone]  Estradiol 47-31  Continue Axiron, recently increased to 4 applications daily. Had Androgel, injections before. On Axiron 3 applications daily, recently increased to 4 applications daily. 2 years on it. Nisha Blood worked the best.  Has some decreased libido   ED on Axiron  - Testosterone, free, total; Future  - Comprehensive Metabolic Panel; Future  - CBC Auto Differential; Future    2. Vitamin D deficiency  25 hydroxy vitamin D 39.1-38.3  - Comprehensive Metabolic Panel; Future  - Vitamin D 25 Hydroxy; Future  - vitamin D (ERGOCALCIFEROL) 1.25 MG (97442 UT) CAPS capsule; Take 1 capsule by mouth once a week  Dispense: 12 capsule; Refill: 1    3. Other fatigue  Continue monitoring  Healthy diet, exercise    4. Elevated insulin-like growth factor 1 (IGF-1) level  IGF-I 143, normal  GH 0.34  Patient was concerned about low growth hormone.   Discussed that growth hormone level is fluctuating through the day. IGF-I was normal.  - Prolactin; Future  - Insulin-Like Growth Factor; Future    5. Pituitary microadenoma  ACTH 19  Prolactin 12.6-8.3  2/18/2022   Pituitary gland: The pituitary gland is mildly heterogeneous without focal lesion, similar to    the prior exam. The infundibulum is midline and is normal in thickness. No suprasellar mass. Normal cavernous sinuses. - MRI BRAIN W WO CONTRAST; Future    6. History of adrenal insufficiency  Referred to [facility]  Patient had a lot of side effects on HC, felt worse  Gained weight   Call for Cortrosyn stim test results  Did have side effects on steroids before  DHEA S 32.6  Cortisol 5.1  FSH 0.1  LH less than 0.1  Ordered Cortrosyn stim test.  Do test prior to starting thyroid replacement therapy. - ACTH; Future  - Cortisol AM, Total; Future    7. Other hyperlipidemia  LDL 96  Triglycerides 244  HDL 31  - Lipid Panel; Future    8. Hypothyroidism  Start levothyroxine if Cortrosyn stimulation test results are normal.  Has insomnia an-3.53d anxiety, follow carefully for side effects  TSH 5.76-4.86  New problem, not treated, previously had elevated TSH as well.   TPO antibody, thyroglobulin antibody negative  Will start treatment after Cortrosyn stimulation test if normal.  -TSH  -Free T4  -Free T3      Reviewed and/or ordered clinical lab results Yes  Reviewed and/or ordered radiology tests Yes   Reviewed and/or ordered other diagnostic tests No  Discussed test results with performing physician No  Independently reviewed image, tracing, or specimen No  Made a decision to obtain old records No  Reviewed and summarized old records Yes   TSH 2.39  IGF-1 131  Obtained history from other than patient No    Virginia Mcadams was counseled regarding symptoms of hypogonadism, pituitary disease, adrenal insufficiency, endocrine causes of fatigue diagnosis, course and complications of disease if inadequately treated, side effects of medications, diagnosis, treatment options, and prognosis, risks, benefits, complications, and alternatives of treatment, labs, imaging and other studies and treatment targets and goals, testing for adrenal insufficiency, hypothyroidism diagnosis and treatment, thyroid hormone replacement therapy, side effects of medication, growth hormone testing, hyperlipidemia, testosterone replacement therapy. He understands instructions and counseling. Total time I spent for this encounter 40 minutes    Return in about 3 months (around 3/14/2023) for thyroid problems.     Electronically signed by Adali Bojorquez MD on 12/14/2022 at 4:28 PM

## 2022-12-21 DIAGNOSIS — E55.9 VITAMIN D DEFICIENCY: ICD-10-CM

## 2022-12-22 RX ORDER — ERGOCALCIFEROL 1.25 MG/1
CAPSULE ORAL
Qty: 4 CAPSULE | Refills: 2 | Status: SHIPPED | OUTPATIENT
Start: 2022-12-22 | End: 2022-12-28 | Stop reason: SDUPTHER

## 2022-12-22 RX ORDER — FLUTICASONE PROPIONATE 50 MCG
SPRAY, SUSPENSION (ML) NASAL
Qty: 1 EACH | Refills: 0 | Status: SHIPPED | OUTPATIENT
Start: 2022-12-22

## 2022-12-22 NOTE — TELEPHONE ENCOUNTER
Medication:   Requested Prescriptions     Pending Prescriptions Disp Refills    fluticasone (FLONASE) 50 MCG/ACT nasal spray [Pharmacy Med Name: FLUTICASONE PROP 50 MCG SPRAY]       Sig: SPRAY TWO SPRAYS IN EACH NOSTRIL ONCE DAILY        Last Filled: 7/12/2022   Last appt: 11/16/2022   Next appt: 2/7/2023

## 2022-12-28 DIAGNOSIS — E55.9 VITAMIN D DEFICIENCY: ICD-10-CM

## 2022-12-28 RX ORDER — FLUTICASONE PROPIONATE 50 MCG
SPRAY, SUSPENSION (ML) NASAL
Qty: 1 EACH | Refills: 0 | OUTPATIENT
Start: 2022-12-28

## 2022-12-28 NOTE — TELEPHONE ENCOUNTER
Medication:   Requested Prescriptions     Pending Prescriptions Disp Refills    fluticasone (FLONASE) 50 MCG/ACT nasal spray 1 each 0        Last Filled:  12/22/22    Patient Phone Number: [phone] (home)     Last appt: 11/16/2022   Next appt: 2/7/2023    Last [place]RS:   RX Monitoring 11/16/2022   Attestation -   Periodic Controlled Substance Monitoring Possible medication side effects, risk of tolerance/dependence & alternative treatments discussed. ;No signs of potential drug abuse or diversion identified.

## 2022-12-29 RX ORDER — ERGOCALCIFEROL 1.25 MG/1
CAPSULE ORAL
Qty: 4 CAPSULE | Refills: 0 | Status: SHIPPED | OUTPATIENT
Start: 2022-12-29

## 2023-01-23 RX ORDER — AZELASTINE HYDROCHLORIDE 137 UG/1
SPRAY, METERED NASAL
Qty: 30 ML | Refills: 2 | Status: SHIPPED | OUTPATIENT
Start: 2023-01-23

## 2023-01-23 RX ORDER — FLUTICASONE PROPIONATE 50 MCG
SPRAY, SUSPENSION (ML) NASAL
Qty: 1 EACH | Refills: 2 | Status: SHIPPED | OUTPATIENT
Start: 2023-01-23

## 2023-01-23 NOTE — TELEPHONE ENCOUNTER
Medication:   Requested Prescriptions     Pending Prescriptions Disp Refills    Azelastine HCl 137 MCG/SPRAY SOLN 30 mL 2     Sig: SPRAY TWO SPRAYS IN EACH NOSTRIL TWICE DAILY    fluticasone (FLONASE) 50 MCG/ACT nasal spray 1 each 0        Last appt: 11/16/2022   Next appt: 1/22/2023

## 2023-01-23 NOTE — TELEPHONE ENCOUNTER
Medication:   Requested Prescriptions     Pending Prescriptions Disp Refills    Azelastine HCl 137 MCG/SPRAY SOLN [Pharmacy Med Name: AZELASTINE 0.1% (137 MCG) SPRY] 30 mL 2     Sig: USE 2 SPRAYS IN EACH NOSTRIL TWO TIMES A DAY        Last Filled: 11/1/2022   Last appt: 11/16/2022   Next appt: 2/7/2023

## 2023-02-07 ENCOUNTER — OFFICE VISIT (OUTPATIENT)
Dept: FAMILY MEDICINE CLINIC | Age: 53
End: 2023-02-07
Payer: MEDICAID

## 2023-02-07 VITALS
WEIGHT: 215 LBS | DIASTOLIC BLOOD PRESSURE: 98 MMHG | BODY MASS INDEX: 31.84 KG/M2 | HEART RATE: 94 BPM | OXYGEN SATURATION: 98 % | HEIGHT: 69 IN | SYSTOLIC BLOOD PRESSURE: 136 MMHG

## 2023-02-07 DIAGNOSIS — F51.01 PRIMARY INSOMNIA: ICD-10-CM

## 2023-02-07 DIAGNOSIS — F41.9 ANXIETY: ICD-10-CM

## 2023-02-07 PROCEDURE — 3017F COLORECTAL CA SCREEN DOC REV: CPT | Performed by: FAMILY MEDICINE

## 2023-02-07 PROCEDURE — 3080F DIAST BP >= 90 MM HG: CPT | Performed by: FAMILY MEDICINE

## 2023-02-07 PROCEDURE — G8482 FLU IMMUNIZE ORDER/ADMIN: HCPCS | Performed by: FAMILY MEDICINE

## 2023-02-07 PROCEDURE — 1036F TOBACCO NON-USER: CPT | Performed by: FAMILY MEDICINE

## 2023-02-07 PROCEDURE — G8417 CALC BMI ABV UP PARAM F/U: HCPCS | Performed by: FAMILY MEDICINE

## 2023-02-07 PROCEDURE — 99213 OFFICE O/P EST LOW 20 MIN: CPT | Performed by: FAMILY MEDICINE

## 2023-02-07 PROCEDURE — 3075F SYST BP GE 130 - 139MM HG: CPT | Performed by: FAMILY MEDICINE

## 2023-02-07 PROCEDURE — G8427 DOCREV CUR MEDS BY ELIG CLIN: HCPCS | Performed by: FAMILY MEDICINE

## 2023-02-07 RX ORDER — ALPRAZOLAM 2 MG/1
2 TABLET ORAL 2 TIMES DAILY PRN
Qty: 45 TABLET | Refills: 2 | Status: SHIPPED | OUTPATIENT
Start: 2023-02-07 | End: 2023-05-08

## 2023-02-07 RX ORDER — CLONAZEPAM 1 MG/1
2 TABLET ORAL NIGHTLY PRN
Qty: 60 TABLET | Refills: 2 | Status: SHIPPED | OUTPATIENT
Start: 2023-02-07 | End: 2023-05-08

## 2023-02-07 SDOH — ECONOMIC STABILITY: FOOD INSECURITY: WITHIN THE PAST 12 MONTHS, THE FOOD YOU BOUGHT JUST DIDN'T LAST AND YOU DIDN'T HAVE MONEY TO GET MORE.: NEVER TRUE

## 2023-02-07 SDOH — ECONOMIC STABILITY: HOUSING INSECURITY
IN THE LAST 12 MONTHS, WAS THERE A TIME WHEN YOU DID NOT HAVE A STEADY PLACE TO SLEEP OR SLEPT IN A SHELTER (INCLUDING NOW)?: NO

## 2023-02-07 SDOH — ECONOMIC STABILITY: INCOME INSECURITY: HOW HARD IS IT FOR YOU TO PAY FOR THE VERY BASICS LIKE FOOD, HOUSING, MEDICAL CARE, AND HEATING?: NOT HARD AT ALL

## 2023-02-07 SDOH — ECONOMIC STABILITY: FOOD INSECURITY: WITHIN THE PAST 12 MONTHS, YOU WORRIED THAT YOUR FOOD WOULD RUN OUT BEFORE YOU GOT MONEY TO BUY MORE.: NEVER TRUE

## 2023-02-07 ASSESSMENT — PATIENT HEALTH QUESTIONNAIRE - PHQ9
SUM OF ALL RESPONSES TO PHQ QUESTIONS 1-9: 0
SUM OF ALL RESPONSES TO PHQ QUESTIONS 1-9: 0
SUM OF ALL RESPONSES TO PHQ9 QUESTIONS 1 & 2: 0
2. FEELING DOWN, DEPRESSED OR HOPELESS: 0
SUM OF ALL RESPONSES TO PHQ QUESTIONS 1-9: 0
SUM OF ALL RESPONSES TO PHQ QUESTIONS 1-9: 0
1. LITTLE INTEREST OR PLEASURE IN DOING THINGS: 0

## 2023-02-07 NOTE — PROGRESS NOTES
Yudelka Diaz (:  1970) is a 45 y.o. male,Established patient, here for evaluation of the following chief complaint(s):  3 Month Follow-Up         ASSESSMENT/PLAN:  Yue Thompson was seen today for 3 month follow-up. Diagnoses and all orders for this visit:    Anxiety  Stable on xanax  Primary insomnia   Stable on klonopin  Controlled Substances Monitoring: Periodic Controlled Substance Monitoring: Possible medication side effects, risk of tolerance/dependence & alternative treatments discussed., No signs of potential drug abuse or diversion identified. John John MD)     No follow-ups on file. Declined treatment or further workup for blood pressure but will continue to monitor this at home      Subjective   SUBJECTIVE/OBJECTIVE:  HPI  Pt is a of 45 y.o. male comes in today with   Chief Complaint   Patient presents with    3 Month Follow-Up     Attributes elevated blood pressure to running late and stress. Normally at home has been well controlled. Anxiety stable on xanax  Takes bid. On klonopin hs for insomnia. Not well controlled. Intolerant of hypnotics in the past.    Dr. Santana July switched him to baclofen which has helped. Following with Dr. Jarad Plascencia for chronic pancreatitis. Still having occasionally flare. No sedation with current medication use. Still seeing endo. Vitals:    23 1520   BP: (!) 140/94   Site: Right Upper Arm   Position: Sitting   Cuff Size: Medium Adult   Pulse: 94   SpO2: 98%   Weight: 215 lb (97.5 kg)   Height: 5' 9\" (1.753 m)        Review of Systems       Objective   Physical Exam         An electronic signature was used to authenticate this note.     --John John MD

## 2023-02-16 NOTE — PROGRESS NOTES
[facility]   Cardiac Consultation    Date: 2/21/23  Patient Name: Chi Cancino  YOB: 1970    Primary Care Physician: Michelle Fierro MD    CHIEF COMPLAINT:   Chief Complaint   Patient presents with    New Patient    Bradycardia     Pt wears smart watch - frequent reports of bradycardia at night     Fatigue     Pt reports he has difficulty falling asleep and staying asleep        HPI:  Chi Cancino is a 45 y.o. male  with a past medical history of hypertension, abdominal aorta dissection and hyperlipidemia. Echo 7/2018 demonstrated an LVEF of 55%. He underwent a GXT exercise stress test 2/2020 that was negative for ischemia. His HR increased with activity on stress test. Patient wore a cardiac event monitor from 7/29/2022 to 8/5/2022 which demonstrated predominately SR with an average HR of 66 (). PAC burden 0.14%, PVC burden < 0.01%. Today, 2/21/2023, patients reports he has noted low HR according to his apple watch. It is a second generation apple watch. He states he has seen cardiology regarding this in the past. He reports in the early morning hours is when he gets the most alerts on his phone. He reports feeling fatigued often. He states that he hasn't been taking his nadolol for quite some time, at least a few years. He was not on nadolol at the time of wearing his cardiac event monitor. He states that he follows with an endocrinologist. He states he is getting a second opinion from [facility] regarding thyroid/adrenal glands. Patient denies current edema, chest pain, sob, palpitations, dizziness or syncope. He reports he uses pain medications for chronic pancreatitis.      Past Medical History:   has a past medical history of Acid reflux, Acromegalia ([address] Anxiety, Arthritis, Chronic pain syndrome, Chronic pancreatitis ([address] Dyslipidemia, Elevated insulin-like growth factor 1 (IGF-1) level, Hypogonadism male, IFG (impaired fasting glucose), Insomnia, Low serum cortisol level, Neuropathic pain, Pituitary adenoma ([facility] and Secondary adrenal insufficiency ([facility] Surgical History:   has a past surgical history that includes Cholecystectomy; ERCP; Celiac plexus block; Abdomen surgery (N/A, 08/19/2022); and cyst removal (2022). Social History:   reports that he has never smoked. He has never used smokeless tobacco. He reports that he does not drink alcohol and does not use drugs. Family History:  family history includes High Blood Pressure in his mother; Lung Cancer in his mother. Home Medications:  Outpatient Encounter Medications as of 2/21/2023   Medication Sig Dispense Refill    ALPRAZolam (XANAX) 2 MG tablet Take 1 tablet by mouth 2 times daily as needed for Anxiety for up to 90 days. 45 tablet 2    clonazePAM (KLONOPIN) 1 MG tablet Take 2 tablets by mouth nightly as needed (sleep) for up to 90 days. 60 tablet 2    Azelastine HCl 137 MCG/SPRAY SOLN SPRAY TWO SPRAYS IN EACH NOSTRIL TWICE DAILY 30 mL 2    fluticasone (FLONASE) 50 MCG/ACT nasal spray SPRAY TWO SPRAYS IN EACH NOSTRIL ONCE DAILY 1 each 2    Azelastine HCl 137 MCG/SPRAY SOLN USE 2 SPRAYS IN EACH NOSTRIL TWO TIMES A DAY 30 mL 2    vitamin D (ERGOCALCIFEROL) 1.25 MG (18242 UT) CAPS capsule TAKE ONE CAPSULE BY MOUTH ONCE WEEKLY 4 capsule 0    famotidine (PEPCID) 20 MG tablet Take 20 mg by mouth nightly      Testosterone 30 MG/ACT SOLN APPLY FOUR PUMP TO THE SKIN DAILY 2 each 3    amphetamine-dextroamphetamine (ADDERALL) 30 MG tablet Take 30 mg by mouth 2 times daily.       omega-3 acid ethyl esters (LOVAZA) 1 g capsule Take 2 capsules by mouth 2 times daily 360 capsule 1    lipase-protease-amylase (CREON) 73548-015023 units CPEP delayed release capsule Take 2 capsules by mouth 4 times daily      MOVANTIK 25 MG TABS tablet 25 mg every morning (before breakfast)      Biotin 60380 MCG TABS Take 10,000 mcg by mouth daily      metroNIDAZOLE (METROCREAM) 0.75 % cream APPLY TO AFFECTED AREA(S) TWO TIMES A DAY 45 g 11    ketoconazole (NIZORAL) 2 % shampoo Apply topically daily as needed. 120 mL 11    triamcinolone (KENALOG) 0.1 % cream Apply topically 2 times daily. (Patient taking differently: as needed Apply topically 2 times daily prn) 80 g 0    Multiple Vitamin (DAILY-SHANNON) TABS TAKE ONE TABLET BY MOUTH DAILY 30 tablet 1    oxymorphone (OPANA ER) 40 MG ER tablet Take 40 mg by mouth 4 times daily       polyethylene glycol (GLYCOLAX) packet Take 17 g by mouth in the morning. omeprazole (PRILOSEC) 20 MG capsule Take 20 mg by mouth Daily       oxycodone (OXY-IR) 30 MG immediate release tablet Take 30 mg by mouth every 4 hours. No facility-administered encounter medications on file as of 2/21/2023. Allergies:  Ambien [zolpidem], Haloperidol, Trazodone and nefazodone, Augmentin [amoxicillin-pot clavulanate], Elavil [amitriptyline], Neurontin [gabapentin], Topamax, and Tramadol     Review of Systems   Constitutional: Negative. HENT: Negative. Eyes: Negative. Respiratory: Negative. Cardiovascular: Negative. Gastrointestinal: Negative. Genitourinary: Negative. Musculoskeletal: Negative. Skin: Negative. Neurological: Negative. Hematological: Negative. Psychiatric/Behavioral: Negative. /60   Pulse (!) 104   Ht 5' 9\" (1.753 m)   Wt 223 lb (101.2 kg)   SpO2 93%   BMI 32.93 kg/m²     DATA:    ECG 2/21/23: Personally reviewed. All current cardiac medications reviewed and adjusted accordingly  2/21/23        Echo 7/2018:   Summary   Normal LV systolic function with an estimated EF of 55%. There is mild concentric left ventricular hypertrophy. No regional wall motion abnormalities are seen. Normal left ventricular diastolic filling pressure. Mild mitral, aortic, and tricuspid regurgitation. Objective:  Physical Exam   Constitutional: He is oriented to person, place, and time. He appears well-developed and well-nourished.    HENT:   Head: Normocephalic and atraumatic. Eyes: Pupils are equal, round, and reactive to light. Neck: Normal range of motion. Cardiovascular: Normal rate, regular rhythm and normal heart sounds. Pulmonary/Chest: Effort normal and breath sounds normal.   Abdominal: Soft. No tenderness. Musculoskeletal: Normal range of motion. He exhibits no edema. Neurological: He is alert and oriented to person, place, and time. Skin: Skin is warm and dry. Psychiatric: He has a normal mood and affect. Assessment:  1. Thyroid disease-follows with endocrinologist   2. Adrenal insufficieny-follows with endocrinologist   3. Pituitary adenoma-follows with endocrinologist   4. Sinus bradycardia-this is primarily nocturnal.  It should be noted that his TSH was elevated when he wore the event monitor. Sinus rate at rest during the office visit was in the 80s. Plan:  1. Continue medications as prescribed. 2. Discussed additional cardiac event monitor if patient would like to proceed. 3. Follow up with EP on an as needed basis. QUALITY MEASURES  1. Tobacco Cessation Counseling: NA  2. Retake of BP if >140/90:   NA  3. Documentation to PCP/referring for new patient:  Sent to PCP at close of office visit  4. CAD patient on anti-platelet: NA  5. CAD patient on STATIN therapy:  NA  6. Patient with CHF and aFib on anticoagulation:  NA    Scribe's attestation: This note was scribed in the presence of Dr. Nadeen Ragsdale MD by Tanisha Arroyo, Dr. Nadeen Ragsdale, personally performed the services described in this documentation as scribed by Willard Li RN in my presence, and it is both accurate and complete.          Nadeen Ragsdale M.D.

## 2023-02-21 ENCOUNTER — OFFICE VISIT (OUTPATIENT)
Dept: CARDIOLOGY CLINIC | Age: 53
End: 2023-02-21
Payer: MEDICAID

## 2023-02-21 VITALS
HEIGHT: 69 IN | WEIGHT: 223 LBS | DIASTOLIC BLOOD PRESSURE: 60 MMHG | SYSTOLIC BLOOD PRESSURE: 100 MMHG | OXYGEN SATURATION: 93 % | BODY MASS INDEX: 33.03 KG/M2 | HEART RATE: 104 BPM

## 2023-02-21 DIAGNOSIS — R53.83 OTHER FATIGUE: ICD-10-CM

## 2023-02-21 DIAGNOSIS — I49.9 IRREGULAR HEART RATE: Primary | ICD-10-CM

## 2023-02-21 PROCEDURE — G8417 CALC BMI ABV UP PARAM F/U: HCPCS | Performed by: INTERNAL MEDICINE

## 2023-02-21 PROCEDURE — 93000 ELECTROCARDIOGRAM COMPLETE: CPT | Performed by: INTERNAL MEDICINE

## 2023-02-21 PROCEDURE — 3078F DIAST BP <80 MM HG: CPT | Performed by: INTERNAL MEDICINE

## 2023-02-21 PROCEDURE — 3074F SYST BP LT 130 MM HG: CPT | Performed by: INTERNAL MEDICINE

## 2023-02-21 PROCEDURE — 1036F TOBACCO NON-USER: CPT | Performed by: INTERNAL MEDICINE

## 2023-02-21 PROCEDURE — 99204 OFFICE O/P NEW MOD 45 MIN: CPT | Performed by: INTERNAL MEDICINE

## 2023-02-21 PROCEDURE — G8482 FLU IMMUNIZE ORDER/ADMIN: HCPCS | Performed by: INTERNAL MEDICINE

## 2023-02-21 PROCEDURE — G8427 DOCREV CUR MEDS BY ELIG CLIN: HCPCS | Performed by: INTERNAL MEDICINE

## 2023-02-21 PROCEDURE — 3017F COLORECTAL CA SCREEN DOC REV: CPT | Performed by: INTERNAL MEDICINE

## 2023-02-21 NOTE — PATIENT INSTRUCTIONS
Plan:  1. Continue medications as prescribed. 2. Discussed additional cardiac event monitor if patient would like to proceed. 3. Follow up with EP on an as needed basis.

## 2023-02-22 ENCOUNTER — OFFICE VISIT (OUTPATIENT)
Dept: SURGERY | Age: 53
End: 2023-02-22
Payer: MEDICAID

## 2023-02-22 VITALS
SYSTOLIC BLOOD PRESSURE: 127 MMHG | TEMPERATURE: 98 F | HEIGHT: 69 IN | DIASTOLIC BLOOD PRESSURE: 87 MMHG | HEART RATE: 92 BPM | BODY MASS INDEX: 32.88 KG/M2 | WEIGHT: 222 LBS

## 2023-02-22 DIAGNOSIS — Z09 POSTOP CHECK: Primary | ICD-10-CM

## 2023-02-22 PROCEDURE — G8427 DOCREV CUR MEDS BY ELIG CLIN: HCPCS | Performed by: SURGERY

## 2023-02-22 PROCEDURE — 3017F COLORECTAL CA SCREEN DOC REV: CPT | Performed by: SURGERY

## 2023-02-22 PROCEDURE — G8482 FLU IMMUNIZE ORDER/ADMIN: HCPCS | Performed by: SURGERY

## 2023-02-22 PROCEDURE — 99213 OFFICE O/P EST LOW 20 MIN: CPT | Performed by: SURGERY

## 2023-02-22 PROCEDURE — G8417 CALC BMI ABV UP PARAM F/U: HCPCS | Performed by: SURGERY

## 2023-02-22 PROCEDURE — 1036F TOBACCO NON-USER: CPT | Performed by: SURGERY

## 2023-02-22 PROCEDURE — 3074F SYST BP LT 130 MM HG: CPT | Performed by: SURGERY

## 2023-02-22 PROCEDURE — 3079F DIAST BP 80-89 MM HG: CPT | Performed by: SURGERY

## 2023-02-22 NOTE — PROGRESS NOTES
[facility]    PROCEDURE: 1) Excision of posterior neck lesion  (1 x 0.5 cm)                                                  2) Complex closure of neck (1.1 cm)                                                  3) Excision of chest cyst lesion (3 x 0.5 cm)                                                  4) Complex closure of chest (4 cm)  DATE: 8/9/22    Miguel Angel Soria has been recovering satisfactorily since his procedure. Pain has been well controlled without pain medications. He is unhappy regarding his scars.     PMHx:   Past Medical History:   Diagnosis Date    Acid reflux     Acromegalia (HCC)     Anxiety     Arthritis     Chronic pain syndrome     Chronic pancreatitis (HCC)     Dyslipidemia     Elevated insulin-like growth factor 1 (IGF-1) level     Hypogonadism male     IFG (impaired fasting glucose)     Insomnia     Low serum cortisol level     Neuropathic pain     Pituitary adenoma ([address]     Secondary adrenal insufficiency (HCC)      PSHx:   Past Surgical History:   Procedure Laterality Date    ABDOMEN SURGERY N/A 08/19/2022    EXCISION OF NECK, CHEST performed by Angie Valdez MD at [place] [address]  2022    ERCP       Allergy:   Allergies   Allergen Reactions    Ambien [Zolpidem] Other (See Comments)     Blackout lists    Haloperidol Hallucinations    Trazodone And Nefazodone     Augmentin [Amoxicillin-Pot Clavulanate] Other (See Comments)     diarrhea    Elavil [Amitriptyline] Nausea Only and Other (See Comments)     JITTERY FEELING    Neurontin [Gabapentin] Nausea Only    Topamax Nausea Only    Tramadol Other (See Comments)     Patient has been on high dose pain medincines since 2002 so tramadol doesn't help with pain at all       SHx:   Social History     Socioeconomic History    Marital status:      Spouse name: Not on file    Number of children: Not on file    Years of education: Not on file    Highest education level: Not on file   Occupational History    Not on file   Tobacco Use    Smoking status: Never    Smokeless tobacco: Never   Vaping Use    Vaping Use: Never used   Substance and Sexual Activity    Alcohol use: No    Drug use: No    Sexual activity: Yes     Partners: Female     Comment: ; 3 children   Other Topics Concern    Not on file   Social History Narrative    Not on file     Social Determinants of Health     Financial Resource Strain: Low Risk     Difficulty of Paying Living Expenses: Not hard at all   Food Insecurity: No Food Insecurity    Worried About Running Out of Food in the Last Year: Never true    [address] in the Last Year: Never true   Transportation Needs: Unknown    Lack of Transportation (Medical): Not on file    Lack of Transportation (Non-Medical): No   Physical Activity: Not on file   Stress: Not on file   Social Connections: Not on file   Intimate Partner Violence: Not on file   Housing Stability: Unknown    Unable to Pay for Housing in the Last Year: Not on file    Number of Places Lived in the Last Year: Not on file    Unstable Housing in the Last Year: No     FHx: Family history reviewed and is noncontributory  Meds:   Current Outpatient Medications   Medication Sig Dispense Refill    ALPRAZolam (XANAX) 2 MG tablet Take 1 tablet by mouth 2 times daily as needed for Anxiety for up to 90 days. 45 tablet 2    clonazePAM (KLONOPIN) 1 MG tablet Take 2 tablets by mouth nightly as needed (sleep) for up to 90 days.  60 tablet 2    Azelastine HCl 137 MCG/SPRAY SOLN SPRAY TWO SPRAYS IN EACH NOSTRIL TWICE DAILY 30 mL 2    fluticasone (FLONASE) 50 MCG/ACT nasal spray SPRAY TWO SPRAYS IN EACH NOSTRIL ONCE DAILY 1 each 2    Azelastine HCl 137 MCG/SPRAY SOLN USE 2 SPRAYS IN EACH NOSTRIL TWO TIMES A DAY 30 mL 2    vitamin D (ERGOCALCIFEROL) 1.25 MG (38158 UT) CAPS capsule TAKE ONE CAPSULE BY MOUTH ONCE WEEKLY 4 capsule 0    famotidine (PEPCID) 20 MG tablet Take 20 mg by mouth nightly Testosterone 30 MG/ACT SOLN APPLY FOUR PUMP TO THE SKIN DAILY 2 each 3    amphetamine-dextroamphetamine (ADDERALL) 30 MG tablet Take 30 mg by mouth 2 times daily. omega-3 acid ethyl esters (LOVAZA) 1 g capsule Take 2 capsules by mouth 2 times daily 360 capsule 1    lipase-protease-amylase (CREON) 08057-257384 units CPEP delayed release capsule Take 2 capsules by mouth 4 times daily      MOVANTIK 25 MG TABS tablet 25 mg every morning (before breakfast)      Biotin 79071 MCG TABS Take 10,000 mcg by mouth daily      metroNIDAZOLE (METROCREAM) 0.75 % cream APPLY TO AFFECTED AREA(S) TWO TIMES A DAY 45 g 11    ketoconazole (NIZORAL) 2 % shampoo Apply topically daily as needed. 120 mL 11    triamcinolone (KENALOG) 0.1 % cream Apply topically 2 times daily. (Patient taking differently: as needed Apply topically 2 times daily prn) 80 g 0    Multiple Vitamin (DAILY-SHANNON) TABS TAKE ONE TABLET BY MOUTH DAILY 30 tablet 1    oxymorphone (OPANA ER) 40 MG ER tablet Take 40 mg by mouth 4 times daily       polyethylene glycol (GLYCOLAX) packet Take 17 g by mouth in the morning. omeprazole (PRILOSEC) 20 MG capsule Take 20 mg by mouth Daily       oxycodone (OXY-IR) 30 MG immediate release tablet Take 30 mg by mouth every 4 hours. No current facility-administered medications for this visit. ROS   Constitutional: Negative for chills and fever. Skin: See HPI    EXAM    /87   Pulse 92   Temp 98 °F (36.7 °C)   Ht 5' 9\" (1.753 m)   Wt 222 lb (100.7 kg)   BMI 32.78 kg/m²     GEN: NAD   CHEST:  Incision well healed without hypertrophy, separation, keloiding and appropriate healing ridge  NECK:  Incision well healed / approximated with slight contracture    IMP: 45 y.o.male s/p excision of multiple lesions  PLAN: Discussed wound healing physiology and his surgery. We discussed his healing ridge and his wound healing as being appropriate.  He states that he believes that he may have had unrealistic expectations and we discussed options for improvement of scar. He is going to return in 9 months if he remains unhappy with his posterior neck scar and we will then discuss options for revision.      Mrailu Murray MD  [address] [place] [address] [address] Reconstructive Surgery  [phone]  02/22/23

## 2023-03-15 DIAGNOSIS — E55.9 VITAMIN D DEFICIENCY: ICD-10-CM

## 2023-03-16 RX ORDER — ERGOCALCIFEROL 1.25 MG/1
CAPSULE ORAL
Qty: 4 CAPSULE | Refills: 0 | Status: SHIPPED | OUTPATIENT
Start: 2023-03-16

## 2023-03-16 RX ORDER — KETOCONAZOLE 20 MG/ML
SHAMPOO TOPICAL
Qty: 120 ML | Refills: 1 | Status: SHIPPED | OUTPATIENT
Start: 2023-03-16

## 2023-03-18 DIAGNOSIS — E55.9 VITAMIN D DEFICIENCY: ICD-10-CM

## 2023-03-20 RX ORDER — ERGOCALCIFEROL 1.25 MG/1
CAPSULE ORAL
Qty: 12 CAPSULE | OUTPATIENT
Start: 2023-03-20

## 2023-04-13 PROBLEM — E27.40 ADRENAL INSUFFICIENCY (HCC): Status: ACTIVE | Noted: 2023-04-13

## 2023-04-19 RX ORDER — AZELASTINE HYDROCHLORIDE 137 UG/1
SPRAY, METERED NASAL
Qty: 30 ML | Refills: 2 | Status: SHIPPED | OUTPATIENT
Start: 2023-04-19

## 2023-04-19 NOTE — TELEPHONE ENCOUNTER
Medication:   Requested Prescriptions     Pending Prescriptions Disp Refills    Azelastine HCl 137 MCG/SPRAY SOLN [Pharmacy Med Name: AZELASTINE 0.1% (137 MCG) SPRY] 30 mL 2     Sig: SPRAY TWO SPRAYS IN EACH NOSTRIL TWICE DAILY        Last Filled: 1/23/2023   Last appt: 2/7/2023   Next appt: 5/9/2023

## 2023-04-22 DIAGNOSIS — E55.9 VITAMIN D DEFICIENCY: ICD-10-CM

## 2023-04-24 DIAGNOSIS — E23.0 HYPOGONADOTROPIC HYPOGONADISM (HCC): ICD-10-CM

## 2023-04-24 DIAGNOSIS — E55.9 VITAMIN D DEFICIENCY: ICD-10-CM

## 2023-04-24 RX ORDER — ERGOCALCIFEROL 1.25 MG/1
CAPSULE ORAL
Qty: 4 CAPSULE | Refills: 0 | OUTPATIENT
Start: 2023-04-24

## 2023-04-24 RX ORDER — OMEGA-3-ACID ETHYL ESTERS 1 G/1
CAPSULE, LIQUID FILLED ORAL
Qty: 120 CAPSULE | OUTPATIENT
Start: 2023-04-24

## 2023-04-24 RX ORDER — TESTOSTERONE 30 MG/1.5ML
SOLUTION TOPICAL
OUTPATIENT
Start: 2023-04-24

## 2023-04-24 RX ORDER — ERGOCALCIFEROL 1.25 MG/1
50000 CAPSULE ORAL WEEKLY
Qty: 4 CAPSULE | Refills: 0 | OUTPATIENT
Start: 2023-04-24

## 2023-04-24 RX ORDER — FLUTICASONE PROPIONATE 50 MCG
SPRAY, SUSPENSION (ML) NASAL
Qty: 16 G | Refills: 2 | OUTPATIENT
Start: 2023-04-24

## 2023-04-24 RX ORDER — FLUTICASONE PROPIONATE 50 MCG
SPRAY, SUSPENSION (ML) NASAL
Qty: 16 G | Refills: 2 | Status: SHIPPED | OUTPATIENT
Start: 2023-04-24

## 2023-04-24 RX ORDER — OMEGA-3-ACID ETHYL ESTERS 1 G/1
2 CAPSULE, LIQUID FILLED ORAL 2 TIMES DAILY
Qty: 360 CAPSULE | Refills: 1 | OUTPATIENT
Start: 2023-04-24

## 2023-04-24 RX ORDER — TESTOSTERONE 30 MG/1.5ML
SOLUTION TOPICAL
Qty: 2 EACH | Refills: 3 | OUTPATIENT
Start: 2023-04-24 | End: 2023-08-24

## 2023-04-24 NOTE — TELEPHONE ENCOUNTER
Medication:   Requested Prescriptions     Pending Prescriptions Disp Refills    fluticasone (FLONASE) 50 MCG/ACT nasal spray 16 g 2        Last Filled:  2/56/03 Duplicate request.    Patient Phone Number: [phone] (home)     Last appt: 2/7/2023   Next appt: 5/9/2023    Last OARRS:   RX Monitoring 2/7/2023   Attestation -   Periodic Controlled Substance Monitoring Possible medication side effects, risk of tolerance/dependence & alternative treatments discussed. ;No signs of potential drug abuse or diversion identified.

## 2023-04-24 NOTE — TELEPHONE ENCOUNTER
Medication:   Requested Prescriptions     Pending Prescriptions Disp Refills    fluticasone (FLONASE) 50 MCG/ACT nasal spray [Pharmacy Med Name: FLUTICASONE PROP 50 MCG SPRAY]       Sig: SPRAY TWO SPRAYS IN EACH NOSTRIL ONCE DAILY        Last Filled:  1/23/23    Patient Phone Number: [phone] (home)     Last appt: 2/7/2023   Next appt: 5/9/2023    Last OARRS:   RX Monitoring 2/7/2023   Attestation -   Periodic Controlled Substance Monitoring Possible medication side effects, risk of tolerance/dependence & alternative treatments discussed. ;No signs of potential drug abuse or diversion identified.

## 2023-04-27 DIAGNOSIS — E23.0 HYPOGONADOTROPIC HYPOGONADISM (HCC): ICD-10-CM

## 2023-04-28 RX ORDER — TESTOSTERONE 30 MG/1.5ML
SOLUTION TOPICAL
OUTPATIENT
Start: 2023-04-28

## 2023-05-08 ENCOUNTER — OFFICE VISIT (OUTPATIENT)
Dept: CARDIOLOGY CLINIC | Age: 53
End: 2023-05-08
Payer: MEDICAID

## 2023-05-08 VITALS
BODY MASS INDEX: 30.9 KG/M2 | HEIGHT: 69 IN | DIASTOLIC BLOOD PRESSURE: 78 MMHG | OXYGEN SATURATION: 94 % | HEART RATE: 93 BPM | SYSTOLIC BLOOD PRESSURE: 118 MMHG | WEIGHT: 208.6 LBS

## 2023-05-08 DIAGNOSIS — I71.02 DISSECTION OF ABDOMINAL AORTA (HCC): ICD-10-CM

## 2023-05-08 DIAGNOSIS — Z79.899 MEDICATION MANAGEMENT: ICD-10-CM

## 2023-05-08 DIAGNOSIS — I77.810 AORTIC ROOT DILATATION (HCC): ICD-10-CM

## 2023-05-08 DIAGNOSIS — F41.9 ANXIETY: ICD-10-CM

## 2023-05-08 DIAGNOSIS — I10 BENIGN HYPERTENSION: ICD-10-CM

## 2023-05-08 DIAGNOSIS — E78.2 MIXED HYPERLIPIDEMIA: ICD-10-CM

## 2023-05-08 DIAGNOSIS — R53.82 CHRONIC FATIGUE: ICD-10-CM

## 2023-05-08 DIAGNOSIS — R00.1 SINUS BRADYCARDIA: Primary | ICD-10-CM

## 2023-05-08 PROCEDURE — G8417 CALC BMI ABV UP PARAM F/U: HCPCS | Performed by: INTERNAL MEDICINE

## 2023-05-08 PROCEDURE — 1036F TOBACCO NON-USER: CPT | Performed by: INTERNAL MEDICINE

## 2023-05-08 PROCEDURE — 99214 OFFICE O/P EST MOD 30 MIN: CPT | Performed by: INTERNAL MEDICINE

## 2023-05-08 PROCEDURE — 3017F COLORECTAL CA SCREEN DOC REV: CPT | Performed by: INTERNAL MEDICINE

## 2023-05-08 PROCEDURE — 3078F DIAST BP <80 MM HG: CPT | Performed by: INTERNAL MEDICINE

## 2023-05-08 PROCEDURE — G8427 DOCREV CUR MEDS BY ELIG CLIN: HCPCS | Performed by: INTERNAL MEDICINE

## 2023-05-08 PROCEDURE — 3074F SYST BP LT 130 MM HG: CPT | Performed by: INTERNAL MEDICINE

## 2023-05-08 NOTE — PROGRESS NOTES
[address] [facility] Office Note  5/8/2023     No chief complaint on file. Subjective:  MrMukesh Mcqueen is here for cardiology follow up of HBP abdominal aortic localized dissection but no aneurysm( stable), Hyperlipidemia, hypertension. C/o ***      [place]:    He followed with Dr. Adalberto Kumar regarding tachycardia on 2/21/2023. He made no changes and planned to follow up as needed. EKG was NSR. Today, he reports ***         PMH of possible aortic dissection and hypertension. He had previously been seen by Dr Rosi Haywood. He reports undergoing cardiology evaluation for chest pain and testing was wnl and felt anxiety/sytress related. CT scan of chest was obtained. He reports being diagnosed in January 2015 with pituitary tumor, extensive testing at [facility] and currently being watched for next 6 months. He was diagnosed with acromegaly. He reports chronic HA's which increase with blood pressure. He was prescribed Nadolol which has helped HA;s. His abdominal US from 10/2019 showed no aneurysm. He's concerned he has aortic aneurysm and reports Dr. Sandi Rodgers states last CT scan was 2009 and needs to be followed. Most recent US for AAA 10/30/19 No evidence of abdominal aortic aneurysm. He took both the [place] vaccine          Review of Systems:  12 point ROS negative in all areas as listed below except as in [place]  Constitutional, EENT, pulmonary, GI, , Musculoskeletal, skin, neurological, hematological, endocrine, Psychiatric      Reviewed past medical history, social, and family history. Non smoker quit 20 years ago, no alcohol, no illicit drugs  Family history is negative for premature coronary artery disease.   Past Medical History:   Diagnosis Date    Acid reflux     Acromegalia (HCC)     Anxiety     Arthritis     Chronic pain syndrome     Chronic pancreatitis (HCC)     Dyslipidemia     Elevated insulin-like growth factor 1 (IGF-1) level     Hypogonadism male     IFG (impaired fasting glucose)
or stenosis in the internal carotid arteries  bilaterally. 2. The vertebral arteries are patent with antegrade flow bilaterally    Abdominal US 7/21/17  FINDINGS: Aorta:   Abdominal aorta is normal in caliber and not significantly changed from the prior study. Proximal aorta measures 1.6 x 1.3 cm. Mid aorta measures 1.6 x 1.3 cm. Distal aorta measure 1.2 x 1.4 cm. Vascular and Doppler interrogation is unremarkable. Iliacs:   Iliac arteries are patent and normal in caliber. ECHO 6/23/17  Normal left ventricular systolic function with an estimated ejection   fraction of 55%. Normal left ventricular diastolic filling pressure. The right ventricle is mildly enlarged. The right atrium is mildly dilated. Mild mitral regurgitation. Mild aortic regurgitation. Systolic pulmonary artery pressure (SPAP) is normal and estimated at 28 mmHg   (RA pressure 3 mmHg). ECHO 4/9/15  Summary  Normal left ventricle size and systolic function with an estimated   Ejection fraction of 55%. No regional wall motion abnormalities are seen. Mild  concentric LVH. Grade I diastolic function ( IVRT 475) with impaired relaxation. Mitral valve is structurally normal.  Mitral valve leaflets appear to open adequately. Mild mitral regurgitation is present. The aortic valve is normal in structure and function. There is no  significant aortic regurgitation. The aortic root is at the upper limit of normal in size. The right atrium is slightly enlarged in size. Abdominal Aorta ultrasound 4/9/15  Findings: The aorta is normal in caliber. No significant atherosclerotic plaque or turbulent flow is identified. Measurements are as follows:   Proximal 2.2 x 1.5 cm Mid 1.5 x 1.4 cm Distal 1.4 x 1.2 cm Right common iliac artery 1.3 x 0.7 cm Left common iliac artery 1.3 x 1.0 cm    CT abdomen 11/22/2013  Aorta is   normal in caliber . There is a focal dissection of distal   abdominal aorta just above the bifurcation.  Apparently

## 2023-05-08 NOTE — PATIENT INSTRUCTIONS
Plan:  Labs from 11/26/22 reviewed in epic and discussed with patient. Current medications reviewed. Refills given as warranted. Recommend asking Ok Vargas MD for a referral to [facility] or [facility]. Recommend getting reassessed if you need to go back on steroids. Once you start back on steroids, the testing is not as accurate. Repeat blood work  -CBC, CMP, TSH, Lipids   -you will need to be fasting for blood work  -it is ok to take your medicine with sips of water or black coffee  No cardiac testing at this time. Follow up with me in 1 year, call in December to make your next appointment.

## 2023-05-09 ENCOUNTER — OFFICE VISIT (OUTPATIENT)
Dept: FAMILY MEDICINE CLINIC | Age: 53
End: 2023-05-09
Payer: MEDICAID

## 2023-05-09 VITALS
HEART RATE: 97 BPM | HEIGHT: 69 IN | SYSTOLIC BLOOD PRESSURE: 130 MMHG | DIASTOLIC BLOOD PRESSURE: 82 MMHG | WEIGHT: 209 LBS | BODY MASS INDEX: 30.96 KG/M2

## 2023-05-09 DIAGNOSIS — D35.2 PITUITARY MICROADENOMA (HCC): ICD-10-CM

## 2023-05-09 DIAGNOSIS — F41.9 ANXIETY: Primary | ICD-10-CM

## 2023-05-09 DIAGNOSIS — E03.9 ACQUIRED HYPOTHYROIDISM: ICD-10-CM

## 2023-05-09 DIAGNOSIS — F51.01 PRIMARY INSOMNIA: ICD-10-CM

## 2023-05-09 PROCEDURE — 99214 OFFICE O/P EST MOD 30 MIN: CPT | Performed by: FAMILY MEDICINE

## 2023-05-09 PROCEDURE — 1036F TOBACCO NON-USER: CPT | Performed by: FAMILY MEDICINE

## 2023-05-09 PROCEDURE — 3078F DIAST BP <80 MM HG: CPT | Performed by: FAMILY MEDICINE

## 2023-05-09 PROCEDURE — G8417 CALC BMI ABV UP PARAM F/U: HCPCS | Performed by: FAMILY MEDICINE

## 2023-05-09 PROCEDURE — G8427 DOCREV CUR MEDS BY ELIG CLIN: HCPCS | Performed by: FAMILY MEDICINE

## 2023-05-09 PROCEDURE — 3074F SYST BP LT 130 MM HG: CPT | Performed by: FAMILY MEDICINE

## 2023-05-09 PROCEDURE — 3017F COLORECTAL CA SCREEN DOC REV: CPT | Performed by: FAMILY MEDICINE

## 2023-05-09 RX ORDER — CLONAZEPAM 1 MG/1
2 TABLET ORAL NIGHTLY PRN
Qty: 60 TABLET | Refills: 2 | Status: SHIPPED | OUTPATIENT
Start: 2023-05-09 | End: 2023-08-07

## 2023-05-09 RX ORDER — ALPRAZOLAM 2 MG/1
2 TABLET ORAL 2 TIMES DAILY PRN
Qty: 45 TABLET | Refills: 2 | Status: SHIPPED | OUTPATIENT
Start: 2023-05-09 | End: 2023-08-07

## 2023-05-09 NOTE — PROGRESS NOTES
Leopoldo Peacemaker (:  1970) is a 45 y.o. male,Established patient, here for evaluation of the following chief complaint(s):  Medication Check         ASSESSMENT/PLAN:  Jake Scott was seen today for medication check. Diagnoses and all orders for this visit:    Anxiety  -     ALPRAZolam (XANAX) 2 MG tablet; Take 1 tablet by mouth 2 times daily as needed for Anxiety for up to 90 days. Stable on xanax during the day  Controlled Substances Monitoring: Periodic Controlled Substance Monitoring: Possible medication side effects, risk of tolerance/dependence & alternative treatments discussed., No signs of potential drug abuse or diversion identified. John Morrison MD)   Primary insomnia  -     clonazePAM (KLONOPIN) 1 MG tablet; Take 2 tablets by mouth nightly as needed (sleep) for up to 90 days. Not well controlled on klonopin but helps. Extremely tolerant but at max doses  Failed multiple meds  [facility])  -     External Referral to Endocrinology  Following with endo. They had requested referral to [facility] but did not go through so this was ordered  Acquired hypothyroidism  -     External Referral to Endocrinology  Last tsh normal but pt thinks symptoms still consistent with this and requesting 2nd opinion       No follow-ups on file. Subjective   SUBJECTIVE/OBJECTIVE:  HPI  Pt is a of 45 y.o. male comes in today with   Chief Complaint   Patient presents with    Medication Check     Friend passed and affected him more than he thought it would. Got medical card and started cannabis for chronic pancreatitis. Worked well initially. Not sure now but not to the dose that Dr. Leona Mcbride recommended yet. Has been on current dose of pain medication for many years and pain has not been well controlled. Anxiety stable but still struggles with sleep. No etoh use.     Vitals:    23 1530   BP: 130/82   Site: Left Upper Arm   Position: Sitting   Cuff Size: Medium Adult   Pulse: 97   Weight:

## 2023-05-22 ENCOUNTER — TELEPHONE (OUTPATIENT)
Dept: FAMILY MEDICINE CLINIC | Age: 53
End: 2023-05-22

## 2023-05-22 ENCOUNTER — TELEPHONE (OUTPATIENT)
Dept: ENDOCRINOLOGY | Age: 53
End: 2023-05-22

## 2023-05-22 DIAGNOSIS — E55.9 VITAMIN D DEFICIENCY: ICD-10-CM

## 2023-05-22 DIAGNOSIS — E78.2 MIXED HYPERLIPIDEMIA: Primary | ICD-10-CM

## 2023-05-22 DIAGNOSIS — E23.0 HYPOGONADOTROPIC HYPOGONADISM (HCC): ICD-10-CM

## 2023-05-22 DIAGNOSIS — E03.9 ACQUIRED HYPOTHYROIDISM: ICD-10-CM

## 2023-05-22 RX ORDER — ERGOCALCIFEROL 1.25 MG/1
50000 CAPSULE ORAL WEEKLY
Qty: 4 CAPSULE | Refills: 0 | OUTPATIENT
Start: 2023-05-22

## 2023-05-22 RX ORDER — KETOCONAZOLE 20 MG/ML
SHAMPOO TOPICAL
Qty: 120 ML | Refills: 1 | Status: CANCELLED | OUTPATIENT
Start: 2023-05-22

## 2023-05-22 RX ORDER — OMEGA-3-ACID ETHYL ESTERS 1 G/1
CAPSULE, LIQUID FILLED ORAL
Qty: 120 CAPSULE | OUTPATIENT
Start: 2023-05-22

## 2023-05-22 RX ORDER — ERGOCALCIFEROL 1.25 MG/1
50000 CAPSULE ORAL WEEKLY
Qty: 4 CAPSULE | Refills: 0 | Status: SHIPPED | OUTPATIENT
Start: 2023-05-22

## 2023-05-22 RX ORDER — TESTOSTERONE 30 MG/1.5ML
SOLUTION TOPICAL
Qty: 2 EACH | Refills: 3 | OUTPATIENT
Start: 2023-05-22 | End: 2023-10-14

## 2023-05-22 RX ORDER — TESTOSTERONE 30 MG/1.5ML
SOLUTION TOPICAL
Qty: 2 EACH | Refills: 0 | Status: SHIPPED | OUTPATIENT
Start: 2023-05-22 | End: 2023-10-14

## 2023-05-22 RX ORDER — OMEGA-3-ACID ETHYL ESTERS 1 G/1
2 CAPSULE, LIQUID FILLED ORAL 2 TIMES DAILY
Qty: 120 CAPSULE | Refills: 0 | Status: SHIPPED | OUTPATIENT
Start: 2023-05-22

## 2023-05-22 RX ORDER — OMEGA-3-ACID ETHYL ESTERS 1 G/1
2 CAPSULE, LIQUID FILLED ORAL 2 TIMES DAILY
Qty: 360 CAPSULE | Refills: 1 | OUTPATIENT
Start: 2023-05-22

## 2023-05-22 RX ORDER — ERGOCALCIFEROL 1.25 MG/1
CAPSULE ORAL
Qty: 4 CAPSULE | Refills: 0 | OUTPATIENT
Start: 2023-05-22

## 2023-05-22 RX ORDER — KETOCONAZOLE 20 MG/ML
SHAMPOO TOPICAL
Qty: 120 ML | Refills: 1 | Status: SHIPPED | OUTPATIENT
Start: 2023-05-22

## 2023-05-22 NOTE — TELEPHONE ENCOUNTER
Medication:   Requested Prescriptions     Pending Prescriptions Disp Refills    Azelastine HCl 137 MCG/SPRAY SOLN 30 mL 2    fluticasone (FLONASE) 50 MCG/ACT nasal spray 16 g 2        Last Filled:  4/19/23    Patient Phone Number: [phone] (home)     Last appt: 5/9/2023   Next appt: 8/8/2023    Last OARRS:   RX Monitoring 5/9/2023   Attestation -   Periodic Controlled Substance Monitoring Possible medication side effects, risk of tolerance/dependence & alternative treatments discussed. ;No signs of potential drug abuse or diversion identified.

## 2023-05-22 NOTE — TELEPHONE ENCOUNTER
Fluticasone  Azelestine  Ketoconazole 2% Shampoo    Send to [facility] in [facility]

## 2023-05-22 NOTE — TELEPHONE ENCOUNTER
I emailed Jessica Anthony regarding this message and she will call patient. I do not see that he has any appointment scheduled. [facility] then he needs to do my blood work which was ordered in December and have appointment here in person. These orders are valid for a year. I ordered TSH, free T4 and free T3 as he requested. Please fax accordingly with MRI results as requested when results are available. Also, let patient know that MRI was 2/18/2022, unless he had it somewhere else. Bettina Ceja

## 2023-05-22 NOTE — TELEPHONE ENCOUNTER
I sent prescription to pharmacy for requested vitamin D and omega-3 fatty acids. Also sent prescription for 1 month testosterone. Notify patient that in order to continue testosterone further, he needs to schedule appointment. Testosterone is a controlled substance and can be prescribed only during in person appointment. Last seen December 14, 2022. Needs appointment before June 14. It is patient's responsibility to make sure he has follow-up appointments in order to prescribe testosterone. I asked the  to contact patient to explain the government policy regarding prescribing controlled substances.   If that driving is the problem, he may look for a closer endocrinologist.

## 2023-05-22 NOTE — TELEPHONE ENCOUNTER
Patient was told he could cancel his last appt because he had not been seen at [facility] yet, however now he needs refills and they are being denied because his appt was canceled. cassandra advise how he can get his medications refilled.

## 2023-05-23 ENCOUNTER — PATIENT MESSAGE (OUTPATIENT)
Dept: ENDOCRINOLOGY | Age: 53
End: 2023-05-23

## 2023-05-23 RX ORDER — FLUTICASONE PROPIONATE 50 MCG
SPRAY, SUSPENSION (ML) NASAL
Qty: 16 G | Refills: 2 | Status: SHIPPED | OUTPATIENT
Start: 2023-05-23

## 2023-05-23 RX ORDER — AZELASTINE HYDROCHLORIDE 137 UG/1
SPRAY, METERED NASAL
Qty: 30 ML | Refills: 2 | Status: SHIPPED | OUTPATIENT
Start: 2023-05-23

## 2023-05-24 NOTE — TELEPHONE ENCOUNTER
Pt called back, gave pt verbatim message.      Informed pt that his appt has to be in person for testosterone and to continue to receive his testosterone medication/refills because it is a controlled substance per the government protocol     I informed pt that Dr. Blanquita Salazar sent in a refill for a month only but he will need to schedule an appt to be seen asap     Pt verbalized understanding     Pt is scheduled for 6/1/23   Pt stated if he has any scheduling issues with his employer he will give us a call back to reschedule    No further questions at this time

## 2023-06-01 ENCOUNTER — HOSPITAL ENCOUNTER (OUTPATIENT)
Age: 53
Discharge: HOME | End: 2023-06-01

## 2023-06-01 ENCOUNTER — OFFICE VISIT (OUTPATIENT)
Dept: ENDOCRINOLOGY | Age: 53
End: 2023-06-01
Payer: MEDICAID

## 2023-06-01 VITALS
HEART RATE: 89 BPM | DIASTOLIC BLOOD PRESSURE: 87 MMHG | TEMPERATURE: 98 F | SYSTOLIC BLOOD PRESSURE: 121 MMHG | BODY MASS INDEX: 30.51 KG/M2 | OXYGEN SATURATION: 98 % | HEIGHT: 69 IN | RESPIRATION RATE: 14 BRPM | WEIGHT: 206 LBS

## 2023-06-01 DIAGNOSIS — Z86.018 HISTORY OF PITUITARY ADENOMA: ICD-10-CM

## 2023-06-01 DIAGNOSIS — E23.0 HYPOGONADOTROPIC HYPOGONADISM (HCC): Primary | ICD-10-CM

## 2023-06-01 DIAGNOSIS — E03.9 ACQUIRED HYPOTHYROIDISM: ICD-10-CM

## 2023-06-01 DIAGNOSIS — E55.9 VITAMIN D DEFICIENCY: ICD-10-CM

## 2023-06-01 DIAGNOSIS — E23.0 HYPOGONADOTROPIC HYPOGONADISM (HCC): ICD-10-CM

## 2023-06-01 DIAGNOSIS — R53.83 OTHER FATIGUE: ICD-10-CM

## 2023-06-01 DIAGNOSIS — Z79.899 MEDICATION MANAGEMENT: ICD-10-CM

## 2023-06-01 DIAGNOSIS — R79.89 ELEVATED INSULIN-LIKE GROWTH FACTOR 1 (IGF-1) LEVEL: ICD-10-CM

## 2023-06-01 DIAGNOSIS — E78.49 OTHER HYPERLIPIDEMIA: ICD-10-CM

## 2023-06-01 DIAGNOSIS — E27.40 ADRENAL INSUFFICIENCY (HCC): ICD-10-CM

## 2023-06-01 DIAGNOSIS — D35.2 PITUITARY MICROADENOMA (HCC): ICD-10-CM

## 2023-06-01 DIAGNOSIS — Z86.39 HISTORY OF ADRENAL INSUFFICIENCY: ICD-10-CM

## 2023-06-01 PROCEDURE — G8417 CALC BMI ABV UP PARAM F/U: HCPCS | Performed by: INTERNAL MEDICINE

## 2023-06-01 PROCEDURE — 99215 OFFICE O/P EST HI 40 MIN: CPT | Performed by: INTERNAL MEDICINE

## 2023-06-01 PROCEDURE — 3017F COLORECTAL CA SCREEN DOC REV: CPT | Performed by: INTERNAL MEDICINE

## 2023-06-01 PROCEDURE — 1036F TOBACCO NON-USER: CPT | Performed by: INTERNAL MEDICINE

## 2023-06-01 PROCEDURE — G8427 DOCREV CUR MEDS BY ELIG CLIN: HCPCS | Performed by: INTERNAL MEDICINE

## 2023-06-01 PROCEDURE — 3074F SYST BP LT 130 MM HG: CPT | Performed by: INTERNAL MEDICINE

## 2023-06-01 PROCEDURE — 3078F DIAST BP <80 MM HG: CPT | Performed by: INTERNAL MEDICINE

## 2023-06-01 RX ORDER — OMEGA-3-ACID ETHYL ESTERS 1 G/1
2 CAPSULE, LIQUID FILLED ORAL 2 TIMES DAILY
Qty: 120 CAPSULE | Refills: 3 | Status: SHIPPED | OUTPATIENT
Start: 2023-06-01

## 2023-06-01 RX ORDER — ERGOCALCIFEROL 1.25 MG/1
50000 CAPSULE ORAL WEEKLY
Qty: 4 CAPSULE | Refills: 3 | Status: SHIPPED | OUTPATIENT
Start: 2023-06-01

## 2023-06-01 RX ORDER — TESTOSTERONE 30 MG/1.5ML
SOLUTION TOPICAL
Qty: 2 EACH | Refills: 3 | Status: SHIPPED | OUTPATIENT
Start: 2023-06-01 | End: 2023-10-24

## 2023-06-01 NOTE — PROGRESS NOTES
fatigue  Continue monitoring  Healthy diet, exercise  Severe fatigue    4. Elevated insulin-like growth factor 1 (IGF-1) level  IGF-I 143, normal  GH 0.34  Patient was concerned about low growth hormone. Discussed that growth hormone level is fluctuating through the day. IGF-I was normal.  - Prolactin; Future  - Insulin-Like Growth Factor; Future    5. Pituitary microadenoma  ACTH 19  Prolactin 12.6-8.3  2/18/2022   Pituitary gland: The pituitary gland is mildly heterogeneous without focal lesion, similar to    the prior exam. The infundibulum is midline and is normal in thickness. No suprasellar mass. Normal cavernous sinuses. - MRI BRAIN W WO CONTRAST; Future    6. History of adrenal insufficiency  Referred to [facility] or  for second opinion  Patient had a lot of side effects on HC, felt worse  Gained weight   Cortrosyn stim test results suboptimal  However, patient does not have symptoms or signs of adrenal insufficiency. He had side effects on steroids before  DHEA S 32.6  Cortisol 5.1  FSH 0.1  LH less than 0.1  - ACTH; Future  - Cortisol AM, Total; Future    7. Other hyperlipidemia  LDL 96  Triglycerides 244  HDL 31  - Lipid Panel; Future    8. Hypothyroidism  Lost eyebrows   Lost scalp hair significantly  Patient denies baldness in family  He is concerned that symptoms are caused by inadequate thyroid function  Obtain lab work, then start low-dose levothyroxine if indicated and follow closely. TSH 5.76-4.86  New problem, not treated, previously had elevated TSH as well.   TPO antibody, thyroglobulin antibody negative  -TSH  -Free T4  -Free T3      Reviewed and/or ordered clinical lab results Yes  Reviewed and/or ordered radiology tests Yes   Reviewed and/or ordered other diagnostic tests No  Discussed test results with performing physician No  Independently reviewed image, tracing, or specimen No  Made a decision to obtain old records No  Reviewed and summarized old records Yes   TSH 2.39  IGF-1

## 2023-06-02 DIAGNOSIS — K86.1 CHRONIC PANCREATITIS, UNSPECIFIED PANCREATITIS TYPE (HCC): Primary | ICD-10-CM

## 2023-06-07 ENCOUNTER — TELEPHONE (OUTPATIENT)
Dept: ENDOCRINOLOGY | Age: 53
End: 2023-06-07

## 2023-06-07 ENCOUNTER — PATIENT MESSAGE (OUTPATIENT)
Dept: FAMILY MEDICINE CLINIC | Age: 53
End: 2023-06-07

## 2023-06-07 DIAGNOSIS — F51.01 PRIMARY INSOMNIA: ICD-10-CM

## 2023-06-07 DIAGNOSIS — F41.9 ANXIETY: ICD-10-CM

## 2023-06-08 RX ORDER — CLONAZEPAM 1 MG/1
2 TABLET ORAL NIGHTLY PRN
Qty: 60 TABLET | Refills: 0 | Status: SHIPPED | OUTPATIENT
Start: 2023-06-08 | End: 2023-09-06

## 2023-06-08 RX ORDER — ALPRAZOLAM 2 MG/1
2 TABLET ORAL 2 TIMES DAILY PRN
Qty: 45 TABLET | Refills: 0 | Status: SHIPPED | OUTPATIENT
Start: 2023-06-08 | End: 2023-09-06
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
## 2023-06-09 NOTE — TELEPHONE ENCOUNTER
Pls call pharmacy to authorize early rf of xanax  He's on klonopin so that should prevent w/d but he's not going to feel great w/o the other.

## 2023-06-29 RX ORDER — AZELASTINE HYDROCHLORIDE 137 UG/1
SPRAY, METERED NASAL
Qty: 30 ML | Refills: 2 | Status: SHIPPED | OUTPATIENT
Start: 2023-06-29

## 2023-06-30 RX ORDER — FLUTICASONE PROPIONATE 50 MCG
SPRAY, SUSPENSION (ML) NASAL
Qty: 16 G | Refills: 5 | Status: SHIPPED | OUTPATIENT
Start: 2023-06-30

## 2023-06-30 RX ORDER — KETOCONAZOLE 20 MG/ML
SHAMPOO TOPICAL
Qty: 120 ML | Refills: 1 | Status: SHIPPED | OUTPATIENT
Start: 2023-06-30

## 2023-07-26 ENCOUNTER — OFFICE VISIT (OUTPATIENT)
Dept: DERMATOLOGY | Age: 53
End: 2023-07-26
Payer: MEDICAID

## 2023-07-26 DIAGNOSIS — L21.9 SEBORRHEIC DERMATITIS: ICD-10-CM

## 2023-07-26 DIAGNOSIS — L82.1 SEBORRHEIC KERATOSES: ICD-10-CM

## 2023-07-26 DIAGNOSIS — D22.9 BENIGN NEVUS: ICD-10-CM

## 2023-07-26 DIAGNOSIS — L71.9 ROSACEA: Primary | ICD-10-CM

## 2023-07-26 PROCEDURE — G8427 DOCREV CUR MEDS BY ELIG CLIN: HCPCS | Performed by: DERMATOLOGY

## 2023-07-26 PROCEDURE — 99214 OFFICE O/P EST MOD 30 MIN: CPT | Performed by: DERMATOLOGY

## 2023-07-26 PROCEDURE — 3017F COLORECTAL CA SCREEN DOC REV: CPT | Performed by: DERMATOLOGY

## 2023-07-26 PROCEDURE — 1036F TOBACCO NON-USER: CPT | Performed by: DERMATOLOGY

## 2023-07-26 PROCEDURE — G8417 CALC BMI ABV UP PARAM F/U: HCPCS | Performed by: DERMATOLOGY

## 2023-07-26 RX ORDER — KETOCONAZOLE 20 MG/ML
SHAMPOO TOPICAL
Qty: 120 ML | Refills: 11 | Status: SHIPPED | OUTPATIENT
Start: 2023-07-26

## 2023-07-26 NOTE — PROGRESS NOTES
[facility]) Dermatology  Pedro Steele M.D.  [phone]       Jairo Shaker  1970    45 y.o. male     Date of Visit: 7/26/2023    Chief Complaint:   Chief Complaint   Patient presents with    Skin Lesion        I was asked to see this patient by Dr. Harrison ref. provider found. History of Present Illness:  1. Total-body skin exam    Multiple nevi. Patient has not noticed any new or changing pigmented lesions. Stable in size, shape, color. Not itching, bleeding. Seborrheic keratoses. Increasing number of hyperkeratotic stuck on papules and plaques over the torso. No discrete lesion itching, bleeding, becoming symptomatic. Rosacea-still has prominent flushing and erythema but very happy with [employer]-developed erythema, scale and inflammatory papules if he misses an application. Uses this at least once daily    Seborrheic dermatitis-doing very well with ketoconazole 2% cream.  Washes face and scalp fairly regularly and more often if he develops a flare. Happy with his improvement    Had several cysts excised through surgery      Review of Systems:  Constitutional: Reports general sense of well-being       Past Medical History, Surgical History, Family History, Medications and Allergies reviewed.     Social History:   Social History     Socioeconomic History    Marital status:      Spouse name: Not on file    Number of children: Not on file    Years of education: Not on file    Highest education level: Not on file   Occupational History    Not on file   Tobacco Use    Smoking status: Never    Smokeless tobacco: Never   Vaping Use    Vaping Use: Never used   Substance and Sexual Activity    Alcohol use: No    Drug use: No    Sexual activity: Yes     Partners: Female     Comment: ; 3 children   Other Topics Concern    Not on file   Social History Narrative    Not on file     Social Determinants of Health     Financial Resource Strain: Low Risk     Difficulty of Paying Living

## 2023-08-08 ENCOUNTER — OFFICE VISIT (OUTPATIENT)
Dept: FAMILY MEDICINE CLINIC | Age: 53
End: 2023-08-08
Payer: MEDICAID

## 2023-08-08 VITALS
SYSTOLIC BLOOD PRESSURE: 120 MMHG | HEART RATE: 51 BPM | WEIGHT: 192 LBS | DIASTOLIC BLOOD PRESSURE: 82 MMHG | BODY MASS INDEX: 28.44 KG/M2 | HEIGHT: 69 IN | OXYGEN SATURATION: 98 %

## 2023-08-08 DIAGNOSIS — K86.1 CHRONIC PANCREATITIS, UNSPECIFIED PANCREATITIS TYPE (HCC): ICD-10-CM

## 2023-08-08 DIAGNOSIS — R10.9 FLANK PAIN: Primary | ICD-10-CM

## 2023-08-08 DIAGNOSIS — R10.13 EPIGASTRIC PAIN: ICD-10-CM

## 2023-08-08 DIAGNOSIS — F51.01 PRIMARY INSOMNIA: ICD-10-CM

## 2023-08-08 DIAGNOSIS — F41.9 ANXIETY: ICD-10-CM

## 2023-08-08 DIAGNOSIS — Q25.40 ABNORMALITY OF ABDOMINAL AORTA: ICD-10-CM

## 2023-08-08 PROCEDURE — 99214 OFFICE O/P EST MOD 30 MIN: CPT | Performed by: FAMILY MEDICINE

## 2023-08-08 PROCEDURE — 3079F DIAST BP 80-89 MM HG: CPT | Performed by: FAMILY MEDICINE

## 2023-08-08 PROCEDURE — G8417 CALC BMI ABV UP PARAM F/U: HCPCS | Performed by: FAMILY MEDICINE

## 2023-08-08 PROCEDURE — 1036F TOBACCO NON-USER: CPT | Performed by: FAMILY MEDICINE

## 2023-08-08 PROCEDURE — 3017F COLORECTAL CA SCREEN DOC REV: CPT | Performed by: FAMILY MEDICINE

## 2023-08-08 PROCEDURE — 3074F SYST BP LT 130 MM HG: CPT | Performed by: FAMILY MEDICINE

## 2023-08-08 PROCEDURE — G8427 DOCREV CUR MEDS BY ELIG CLIN: HCPCS | Performed by: FAMILY MEDICINE

## 2023-08-08 RX ORDER — CLONAZEPAM 1 MG/1
2 TABLET ORAL NIGHTLY PRN
Qty: 60 TABLET | Refills: 2 | Status: SHIPPED | OUTPATIENT
Start: 2023-08-08 | End: 2023-11-06

## 2023-08-08 RX ORDER — ALPRAZOLAM 2 MG/1
2 TABLET ORAL 2 TIMES DAILY PRN
Qty: 45 TABLET | Refills: 2 | Status: SHIPPED | OUTPATIENT
Start: 2023-08-08 | End: 2023-11-06

## 2023-08-08 NOTE — PROGRESS NOTES
Lucas Lewis (:  1970) is a 45 y.o. male,Established patient, here for evaluation of the following chief complaint(s):  3 Month Follow-Up (Pt also has  \"issue\" to discuss)         ASSESSMENT/PLAN:  Moses Martin was seen today for 3 month follow-up. Diagnoses and all orders for this visit:  Epigastric pain and  Flank pain  CT to evaluate  R/o stone, aortic dissection, pancreatitis  Anxiety  Stable on xanaxx  Primary insomnia  Stable on klonopin hs  Controlled Substances Monitoring: Periodic Controlled Substance Monitoring: Possible medication side effects, risk of tolerance/dependence & alternative treatments discussed., No signs of potential drug abuse or diversion identified. Sharath Tavarez MD)   Chronic pancreatitis, unspecified pancreatitis type ([address])  Stable. Following with GI  Abnormality of abdominal aorta  Due for recheck       No follow-ups on file. Subjective   SUBJECTIVE/OBJECTIVE:  HPI  Pt is a of 45 y.o. male comes in today with   Chief Complaint   Patient presents with    3 Month Follow-Up     Pt also has  \"issue\" to discuss     7-8 weeks ago started to get a different pain in the abdomen. No exacerbating or alleviating factors. Makes it hard to eat. Feels like stomach starts in the center and then spreads out. Review of Systems       Objective   Physical Exam  Constitutional:       Appearance: Normal appearance. Cardiovascular:      Rate and Rhythm: Normal rate and regular rhythm. Pulmonary:      Effort: Pulmonary effort is normal.      Breath sounds: Normal breath sounds. Abdominal:      Tenderness: There is abdominal tenderness in the epigastric area. There is right CVA tenderness and left CVA tenderness. Neurological:      Mental Status: He is alert. An electronic signature was used to authenticate this note.     --Sharath Tavarez MD

## 2023-09-26 DIAGNOSIS — E23.0 HYPOGONADOTROPIC HYPOGONADISM (HCC): ICD-10-CM

## 2023-09-26 DIAGNOSIS — E55.9 VITAMIN D DEFICIENCY: ICD-10-CM

## 2023-09-26 DIAGNOSIS — E78.49 OTHER HYPERLIPIDEMIA: ICD-10-CM

## 2023-09-26 RX ORDER — ERGOCALCIFEROL 1.25 MG/1
50000 CAPSULE ORAL WEEKLY
Qty: 4 CAPSULE | Refills: 1 | Status: SHIPPED | OUTPATIENT
Start: 2023-09-26

## 2023-09-26 RX ORDER — OMEGA-3-ACID ETHYL ESTERS 1 G/1
2 CAPSULE, LIQUID FILLED ORAL 2 TIMES DAILY
Qty: 120 CAPSULE | Refills: 1 | Status: SHIPPED | OUTPATIENT
Start: 2023-09-26

## 2023-09-26 RX ORDER — TESTOSTERONE 30 MG/1.5ML
SOLUTION TOPICAL
Qty: 2 EACH | Refills: 0 | Status: SHIPPED | OUTPATIENT
Start: 2023-09-26 | End: 2023-10-26

## 2023-09-26 NOTE — TELEPHONE ENCOUNTER
Medication:   Requested Prescriptions     Pending Prescriptions Disp Refills    vitamin D (ERGOCALCIFEROL) 1.25 MG (33105 UT) CAPS capsule [Pharmacy Med Name: VIT D2 (ERGOCAL) 1.25MG(50,000U) CP] 4 capsule 1     Sig: TAKE ONE CAPSULE BY MOUTH ONCE WEEKLY    omega-3 acid ethyl esters (LOVAZA) 1 g capsule [Pharmacy Med Name: OMEGA-3 ETHYL ESTERS 1 GM CAP] 120 capsule 1     Sig: TAKE 2 CAPSULES BY MOUTH TWICE A DAY    Testosterone 30 MG/ACT SOLN [Pharmacy Med Name: TESTOSTERONE 30 MG/1.5 ML PUMP] 2 each 0     Sig: APPLY FOUR TIMES A DAY TO THE SKIN       Last Filled:  6/1/23    Patient Phone Number: [phone] (home)     Last appt: 6/1/2023   Next appt: 10/17/2023    Last Labs DM:   Lab Results   Component Value Date/Time    LABA1C 5.4 05/23/2016 09:20 PM     Last Lipid:   Lab Results   Component Value Date/Time    CHOL 176 02/11/2022 08:36 AM    TRIG 244 02/11/2022 08:36 AM    HDL 31 02/11/2022 08:36 AM    HDL 36 05/11/2012 10:08 AM    LDLCALC 96 02/11/2022 08:36 AM     Last PSA:   Lab Results   Component Value Date/Time    PSA 1.0 08/21/2018 12:00 AM     Last Thyroid:   Lab Results   Component Value Date/Time    TSH 3.53 11/26/2022 10:33 AM    FT3 4.0 11/26/2022 10:33 AM    G2ZDNRU 1.38 02/11/2022 08:36 AM    T4FREE 1.2 11/26/2022 10:33 AM    G6SNJSN 6.3 02/11/2022 08:36 AM

## 2023-09-27 RX ORDER — OMEGA-3-ACID ETHYL ESTERS 1 G/1
2 CAPSULE, LIQUID FILLED ORAL 2 TIMES DAILY
Qty: 120 CAPSULE | Refills: 3 | OUTPATIENT
Start: 2023-09-27

## 2023-09-27 RX ORDER — AZELASTINE HYDROCHLORIDE 137 UG/1
SPRAY, METERED NASAL
Qty: 30 ML | Refills: 2 | Status: SHIPPED | OUTPATIENT
Start: 2023-09-27

## 2023-09-27 RX ORDER — ERGOCALCIFEROL 1.25 MG/1
50000 CAPSULE ORAL WEEKLY
Qty: 4 CAPSULE | Refills: 3 | OUTPATIENT
Start: 2023-09-27

## 2023-09-27 RX ORDER — TESTOSTERONE 30 MG/1.5ML
SOLUTION TOPICAL
Qty: 2 EACH | Refills: 3 | OUTPATIENT
Start: 2023-09-27 | End: 2024-02-18

## 2023-09-27 NOTE — TELEPHONE ENCOUNTER
Medication:   Requested Prescriptions     Pending Prescriptions Disp Refills    Azelastine HCl 137 MCG/SPRAY SOLN [Pharmacy Med Name: AZELASTINE 0.1% (137 MCG) SPRY] 30 mL 2     Sig: SPRAY TWO SPRAYS IN EACH NOSTRIL TWICE DAILY     Last Filled:  1/23/2023    Last appt: 8/8/2023   Next appt: 11/8/2023

## 2023-10-17 ENCOUNTER — TELEPHONE (OUTPATIENT)
Dept: ENDOCRINOLOGY | Age: 53
End: 2023-10-17

## 2023-10-17 NOTE — TELEPHONE ENCOUNTER
Pt called in and said he called this morning saying he was sick and next available is not until March. Pt then sent a [employer] message and is requesting Opal Lee give him a call back asap as his appt is this afternoon.

## 2023-10-24 ENCOUNTER — TELEPHONE (OUTPATIENT)
Dept: FAMILY MEDICINE CLINIC | Age: 53
End: 2023-10-24

## 2023-10-24 ENCOUNTER — OFFICE VISIT (OUTPATIENT)
Dept: ENDOCRINOLOGY | Age: 53
End: 2023-10-24
Payer: MEDICAID

## 2023-10-24 VITALS
TEMPERATURE: 98 F | HEART RATE: 97 BPM | RESPIRATION RATE: 14 BRPM | SYSTOLIC BLOOD PRESSURE: 117 MMHG | DIASTOLIC BLOOD PRESSURE: 85 MMHG | WEIGHT: 192 LBS | BODY MASS INDEX: 28.44 KG/M2 | HEIGHT: 69 IN | OXYGEN SATURATION: 97 %

## 2023-10-24 DIAGNOSIS — E78.49 OTHER HYPERLIPIDEMIA: ICD-10-CM

## 2023-10-24 DIAGNOSIS — E23.0 HYPOGONADOTROPIC HYPOGONADISM (HCC): Primary | ICD-10-CM

## 2023-10-24 DIAGNOSIS — R53.83 OTHER FATIGUE: ICD-10-CM

## 2023-10-24 DIAGNOSIS — Z86.39 HISTORY OF ADRENAL INSUFFICIENCY: ICD-10-CM

## 2023-10-24 DIAGNOSIS — D35.2 PITUITARY MICROADENOMA (HCC): ICD-10-CM

## 2023-10-24 DIAGNOSIS — R79.89 ELEVATED INSULIN-LIKE GROWTH FACTOR 1 (IGF-1) LEVEL: ICD-10-CM

## 2023-10-24 DIAGNOSIS — E55.9 VITAMIN D DEFICIENCY: ICD-10-CM

## 2023-10-24 DIAGNOSIS — E03.9 ACQUIRED HYPOTHYROIDISM: ICD-10-CM

## 2023-10-24 PROCEDURE — 99215 OFFICE O/P EST HI 40 MIN: CPT | Performed by: INTERNAL MEDICINE

## 2023-10-24 PROCEDURE — 1036F TOBACCO NON-USER: CPT | Performed by: INTERNAL MEDICINE

## 2023-10-24 PROCEDURE — 3074F SYST BP LT 130 MM HG: CPT | Performed by: INTERNAL MEDICINE

## 2023-10-24 PROCEDURE — G8427 DOCREV CUR MEDS BY ELIG CLIN: HCPCS | Performed by: INTERNAL MEDICINE

## 2023-10-24 PROCEDURE — 3079F DIAST BP 80-89 MM HG: CPT | Performed by: INTERNAL MEDICINE

## 2023-10-24 PROCEDURE — G8417 CALC BMI ABV UP PARAM F/U: HCPCS | Performed by: INTERNAL MEDICINE

## 2023-10-24 PROCEDURE — 3017F COLORECTAL CA SCREEN DOC REV: CPT | Performed by: INTERNAL MEDICINE

## 2023-10-24 PROCEDURE — G8484 FLU IMMUNIZE NO ADMIN: HCPCS | Performed by: INTERNAL MEDICINE

## 2023-10-24 RX ORDER — DEXTROAMPHETAMINE SACCHARATE, AMPHETAMINE ASPARTATE, DEXTROAMPHETAMINE SULFATE AND AMPHETAMINE SULFATE 5; 5; 5; 5 MG/1; MG/1; MG/1; MG/1
TABLET ORAL
COMMUNITY
Start: 2023-09-11

## 2023-10-24 RX ORDER — TESTOSTERONE 30 MG/1.5ML
SOLUTION TOPICAL
Qty: 2 EACH | Refills: 3 | Status: SHIPPED | OUTPATIENT
Start: 2023-10-24 | End: 2023-11-23

## 2023-10-24 NOTE — TELEPHONE ENCOUNTER
PT was given a plaque for a Handicap sign but failed to turn it in on time and it . He is requesting another one to be mailed to his home address.   [address]

## 2023-10-24 NOTE — PROGRESS NOTES
SUBJECTIVE:  Evelyn Davis is a 52 y.o. male who is being evaluated for hypogonadism. 1. Hypogonadotropic hypogonadism ([address])  This started in 2006. Patient was diagnosed with hypogonadism. The problem has been unchanged. Patient started medication in 2006. Currently patient is on: Pooja Friar. Misses  0 doses a month. In 2006 started hot flashes. Had Androgel, injections before. On Axiron 4 applications daily  2 years on it. Pooja Friar worked the best.  No decreased libido or ED on Axiron    Complains of insomnia, fatigue. Was on [facility] replacement therapy. Not on it now. 2. Elevated insulin-like growth factor 1 (IGF-1) level  Elevated IGF-1  Had [facility] suppression test  Was found abnormal, he was prescribed medication, but patient did not take it initially. Later he took medication. Has hand swelling  Shoe size increased 0.5 size  Ring size increased  Had carpal tunnel surgery  Had dental changes, gaps    3. History of adrenal insufficiency   On dexamethasone small dose, then weaned off. In 2012 stopped medication  Was in [facility] for consultation  Not on medication since then. Later on dexamethasone 0.5 mg daily. Run out. He is not on dexamethasone   No problems when of dexamethasone. No hypotension, nausea, vomiting    Current complaints: fatigue, insomnia, muscle weakness, loss of eyebrows. Fatigue was severe. On Axiron 4 applications daily    4. Pituitary microadenoma ([address])  In 2012 Dx with pituitary microadenoma  In 2010 had severe headaches, migraines, Dx with cluster headaches. Has Hx of pancreatitis due to gallstones. Has ongoing pancreas issues since then  Sees Dr. Sandy Suazo. History of obstructive symptoms: difficulty swallowing No, changes in voice/hoarseness No.  History of radiation to patient's neck: No  Resent iodine exposure: No  Family history includes no thyroid abnormalities.   Family history of thyroid cancer: No    Gained weight, then lost some when stopped

## 2023-10-25 RX ORDER — EPINEPHRINE 1 MG/ML
0.3 INJECTION, SOLUTION INTRAMUSCULAR; SUBCUTANEOUS PRN
OUTPATIENT
Start: 2023-10-25

## 2023-10-25 RX ORDER — COSYNTROPIN 0.25 MG/ML
250 INJECTION, POWDER, FOR SOLUTION INTRAMUSCULAR; INTRAVENOUS ONCE
OUTPATIENT
Start: 2023-10-25 | End: 2023-10-25

## 2023-10-25 RX ORDER — DIPHENHYDRAMINE HYDROCHLORIDE 50 MG/ML
50 INJECTION INTRAMUSCULAR; INTRAVENOUS
OUTPATIENT
Start: 2023-10-25

## 2023-10-25 RX ORDER — ALBUTEROL SULFATE 90 UG/1
4 AEROSOL, METERED RESPIRATORY (INHALATION) PRN
OUTPATIENT
Start: 2023-10-25

## 2023-10-25 RX ORDER — SODIUM CHLORIDE 9 MG/ML
5-250 INJECTION, SOLUTION INTRAVENOUS PRN
OUTPATIENT
Start: 2023-10-25

## 2023-10-25 RX ORDER — SODIUM CHLORIDE 0.9 % (FLUSH) 0.9 %
5-40 SYRINGE (ML) INJECTION PRN
OUTPATIENT
Start: 2023-10-25

## 2023-10-25 RX ORDER — ACETAMINOPHEN 325 MG/1
650 TABLET ORAL
OUTPATIENT
Start: 2023-10-25

## 2023-10-25 RX ORDER — SODIUM CHLORIDE 9 MG/ML
INJECTION, SOLUTION INTRAVENOUS CONTINUOUS
OUTPATIENT
Start: 2023-10-25

## 2023-10-25 RX ORDER — ONDANSETRON 2 MG/ML
8 INJECTION INTRAMUSCULAR; INTRAVENOUS
OUTPATIENT
Start: 2023-10-25

## 2023-10-25 RX ORDER — HEPARIN 100 UNIT/ML
500 SYRINGE INTRAVENOUS PRN
OUTPATIENT
Start: 2023-10-25

## 2023-11-17 ENCOUNTER — OFFICE VISIT (OUTPATIENT)
Dept: FAMILY MEDICINE CLINIC | Age: 53
End: 2023-11-17
Payer: MEDICAID

## 2023-11-17 VITALS
HEART RATE: 80 BPM | DIASTOLIC BLOOD PRESSURE: 72 MMHG | OXYGEN SATURATION: 95 % | HEIGHT: 69 IN | SYSTOLIC BLOOD PRESSURE: 137 MMHG | WEIGHT: 184 LBS | BODY MASS INDEX: 27.25 KG/M2

## 2023-11-17 DIAGNOSIS — F41.9 ANXIETY: ICD-10-CM

## 2023-11-17 DIAGNOSIS — G89.29 CHRONIC PAIN OF LEFT KNEE: Primary | ICD-10-CM

## 2023-11-17 DIAGNOSIS — M25.562 CHRONIC PAIN OF LEFT KNEE: Primary | ICD-10-CM

## 2023-11-17 DIAGNOSIS — F51.01 PRIMARY INSOMNIA: ICD-10-CM

## 2023-11-17 PROCEDURE — 3075F SYST BP GE 130 - 139MM HG: CPT | Performed by: FAMILY MEDICINE

## 2023-11-17 PROCEDURE — 1036F TOBACCO NON-USER: CPT | Performed by: FAMILY MEDICINE

## 2023-11-17 PROCEDURE — 99214 OFFICE O/P EST MOD 30 MIN: CPT | Performed by: FAMILY MEDICINE

## 2023-11-17 PROCEDURE — 3017F COLORECTAL CA SCREEN DOC REV: CPT | Performed by: FAMILY MEDICINE

## 2023-11-17 PROCEDURE — G8427 DOCREV CUR MEDS BY ELIG CLIN: HCPCS | Performed by: FAMILY MEDICINE

## 2023-11-17 PROCEDURE — G8417 CALC BMI ABV UP PARAM F/U: HCPCS | Performed by: FAMILY MEDICINE

## 2023-11-17 PROCEDURE — 3078F DIAST BP <80 MM HG: CPT | Performed by: FAMILY MEDICINE

## 2023-11-17 PROCEDURE — G8484 FLU IMMUNIZE NO ADMIN: HCPCS | Performed by: FAMILY MEDICINE

## 2023-11-17 RX ORDER — CLONAZEPAM 1 MG/1
2 TABLET ORAL NIGHTLY PRN
Qty: 60 TABLET | Refills: 2 | Status: SHIPPED | OUTPATIENT
Start: 2023-11-17 | End: 2024-02-15

## 2023-11-17 RX ORDER — ALPRAZOLAM 2 MG/1
2 TABLET ORAL 2 TIMES DAILY PRN
Qty: 45 TABLET | Refills: 2 | Status: SHIPPED | OUTPATIENT
Start: 2023-11-17 | End: 2024-02-15

## 2023-11-17 NOTE — PROGRESS NOTES
doesn't help with pain at all      Social hx:Reviewed. Social History     Tobacco Use   Smoking Status Never    Passive exposure: Never   Smokeless Tobacco Never        Review of Systems       Objective   Physical Exam         An electronic signature was used to authenticate this note.     --Shawn Connolly MD

## 2023-11-26 DIAGNOSIS — E78.49 OTHER HYPERLIPIDEMIA: ICD-10-CM

## 2023-11-26 DIAGNOSIS — E55.9 VITAMIN D DEFICIENCY: ICD-10-CM

## 2023-11-27 RX ORDER — ERGOCALCIFEROL 1.25 MG/1
50000 CAPSULE ORAL WEEKLY
Qty: 4 CAPSULE | Refills: 1 | Status: SHIPPED | OUTPATIENT
Start: 2023-11-27

## 2023-11-27 RX ORDER — OMEGA-3-ACID ETHYL ESTERS 1 G/1
2 CAPSULE, LIQUID FILLED ORAL 2 TIMES DAILY
Qty: 120 CAPSULE | Refills: 1 | Status: SHIPPED | OUTPATIENT
Start: 2023-11-27

## 2024-01-18 ENCOUNTER — OFFICE VISIT (OUTPATIENT)
Dept: ENDOCRINOLOGY | Age: 54
End: 2024-01-18
Payer: MEDICAID

## 2024-01-18 VITALS
TEMPERATURE: 98 F | HEART RATE: 85 BPM | OXYGEN SATURATION: 96 % | HEIGHT: 69 IN | BODY MASS INDEX: 27.7 KG/M2 | DIASTOLIC BLOOD PRESSURE: 67 MMHG | RESPIRATION RATE: 14 BRPM | SYSTOLIC BLOOD PRESSURE: 124 MMHG | WEIGHT: 187 LBS

## 2024-01-18 DIAGNOSIS — E66.3 OVERWEIGHT (BMI 25.0-29.9): ICD-10-CM

## 2024-01-18 DIAGNOSIS — E55.9 VITAMIN D DEFICIENCY: ICD-10-CM

## 2024-01-18 DIAGNOSIS — D35.2 PITUITARY MICROADENOMA (HCC): ICD-10-CM

## 2024-01-18 DIAGNOSIS — E78.49 OTHER HYPERLIPIDEMIA: ICD-10-CM

## 2024-01-18 DIAGNOSIS — E23.0 HYPOGONADOTROPIC HYPOGONADISM (HCC): ICD-10-CM

## 2024-01-18 DIAGNOSIS — Z86.39 HISTORY OF ADRENAL INSUFFICIENCY: ICD-10-CM

## 2024-01-18 DIAGNOSIS — E03.9 ACQUIRED HYPOTHYROIDISM: ICD-10-CM

## 2024-01-18 DIAGNOSIS — N52.9 ERECTILE DYSFUNCTION, UNSPECIFIED ERECTILE DYSFUNCTION TYPE: ICD-10-CM

## 2024-01-18 DIAGNOSIS — R79.89 ELEVATED INSULIN-LIKE GROWTH FACTOR 1 (IGF-1) LEVEL: ICD-10-CM

## 2024-01-18 DIAGNOSIS — E23.0 HYPOGONADOTROPIC HYPOGONADISM (HCC): Primary | ICD-10-CM

## 2024-01-18 DIAGNOSIS — R53.83 OTHER FATIGUE: ICD-10-CM

## 2024-01-18 PROCEDURE — 3074F SYST BP LT 130 MM HG: CPT | Performed by: INTERNAL MEDICINE

## 2024-01-18 PROCEDURE — 3017F COLORECTAL CA SCREEN DOC REV: CPT | Performed by: INTERNAL MEDICINE

## 2024-01-18 PROCEDURE — 3078F DIAST BP <80 MM HG: CPT | Performed by: INTERNAL MEDICINE

## 2024-01-18 PROCEDURE — 99215 OFFICE O/P EST HI 40 MIN: CPT | Performed by: INTERNAL MEDICINE

## 2024-01-18 PROCEDURE — 1036F TOBACCO NON-USER: CPT | Performed by: INTERNAL MEDICINE

## 2024-01-18 PROCEDURE — G8417 CALC BMI ABV UP PARAM F/U: HCPCS | Performed by: INTERNAL MEDICINE

## 2024-01-18 PROCEDURE — G8427 DOCREV CUR MEDS BY ELIG CLIN: HCPCS | Performed by: INTERNAL MEDICINE

## 2024-01-18 PROCEDURE — G8484 FLU IMMUNIZE NO ADMIN: HCPCS | Performed by: INTERNAL MEDICINE

## 2024-01-18 RX ORDER — ERGOCALCIFEROL 1.25 MG/1
50000 CAPSULE ORAL WEEKLY
Qty: 4 CAPSULE | Refills: 1 | Status: SHIPPED | OUTPATIENT
Start: 2024-01-18 | End: 2024-01-18 | Stop reason: SDUPTHER

## 2024-01-18 RX ORDER — SILDENAFIL 100 MG/1
100 TABLET, FILM COATED ORAL DAILY
Qty: 30 TABLET | Refills: 1 | Status: SHIPPED | OUTPATIENT
Start: 2024-01-18

## 2024-01-18 RX ORDER — ERGOCALCIFEROL 1.25 MG/1
50000 CAPSULE ORAL WEEKLY
Qty: 4 CAPSULE | Refills: 5 | Status: SHIPPED | OUTPATIENT
Start: 2024-01-18

## 2024-01-18 RX ORDER — OMEGA-3-ACID ETHYL ESTERS 1 G/1
2 CAPSULE, LIQUID FILLED ORAL 2 TIMES DAILY
Qty: 120 CAPSULE | Refills: 5 | Status: SHIPPED | OUTPATIENT
Start: 2024-01-18

## 2024-01-18 RX ORDER — TESTOSTERONE 30 MG/1.5ML
SOLUTION TOPICAL
Qty: 2 EACH | Refills: 3 | Status: SHIPPED | OUTPATIENT
Start: 2024-01-18 | End: 2024-02-17

## 2024-01-18 RX ORDER — OMEGA-3-ACID ETHYL ESTERS 1 G/1
2 CAPSULE, LIQUID FILLED ORAL 2 TIMES DAILY
Qty: 120 CAPSULE | Refills: 1 | Status: SHIPPED | OUTPATIENT
Start: 2024-01-18 | End: 2024-01-18 | Stop reason: SDUPTHER

## 2024-01-18 NOTE — PROGRESS NOTES
11/26/2022 10:33 AM    CREATININE 0.8 11/26/2022 10:33 AM    GLUCOSE 92 11/26/2022 10:33 AM    CALCIUM 9.1 11/26/2022 10:33 AM    PROT 7.0 11/26/2022 10:33 AM    PROT 6.8 03/15/2013 08:35 AM    LABALBU 4.3 11/26/2022 10:33 AM    BILITOT 0.7 11/26/2022 10:33 AM    ALKPHOS 79 11/26/2022 10:33 AM    AST 28 11/26/2022 10:33 AM    ALT 30 11/26/2022 10:33 AM    LABGLOM >60 11/26/2022 10:33 AM    GFRAA >60 08/20/2022 09:30 AM    GFRAA >60 03/15/2013 08:35 AM    AGRATIO 1.6 11/26/2022 10:33 AM    GLOB 2.4 05/23/2016 03:43 PM     Lab Results   Component Value Date/Time    TSHFT4 2.39 10/08/2021 04:42 PM    TSH 3.53 11/26/2022 10:33 AM    FT3 4.0 11/26/2022 10:33 AM     Lab Results   Component Value Date/Time    LABA1C 5.4 05/23/2016 09:20 PM     Lab Results   Component Value Date/Time    .3 05/23/2016 09:20 PM     Lab Results   Component Value Date/Time    CHOL 176 02/11/2022 08:36 AM     Lab Results   Component Value Date/Time    TRIG 244 02/11/2022 08:36 AM     Lab Results   Component Value Date/Time    HDL 31 02/11/2022 08:36 AM    HDL 36 05/11/2012 10:08 AM     Lab Results   Component Value Date/Time    LDLCHOLESTEROL 128 10/24/2014 03:14 PM    LDLCALC 96 02/11/2022 08:36 AM     Lab Results   Component Value Date/Time    LABVLDL 49 02/11/2022 08:36 AM    VLDL 34 08/21/2018 12:00 AM     Lab Results   Component Value Date/Time    CHOLHDLRATIO 7.4 08/21/2018 12:00 AM     No results found for: \"OSUE99OSP\"  Lab Results   Component Value Date/Time    VITD25 38.3 11/26/2022 10:33 AM        ASSESSMENT/PLAN:    1. Hypogonadotropic hypogonadism (HCC)  Call for results.  Had Androgel, testosterone injections before.  Had fluctuations on injections and Axiron. Has sweats back.  On Axiron 4 applications daily  Axiron worked better than cypionate, but not good enough.  Still symptomatic.  Also has worsening of depression.  Has ED, decreased libido.  Topical testosterone preparations and testosterone cypionate were not

## 2024-01-19 LAB
25(OH)D3 SERPL-MCNC: 48.4 NG/ML
ALBUMIN SERPL-MCNC: 4.6 G/DL (ref 3.4–5)
ALBUMIN/GLOB SERPL: 1.8 {RATIO} (ref 1.1–2.2)
ALP SERPL-CCNC: 82 U/L (ref 40–129)
ALT SERPL-CCNC: 17 U/L (ref 10–40)
ANION GAP SERPL CALCULATED.3IONS-SCNC: 7 MMOL/L (ref 3–16)
AST SERPL-CCNC: 21 U/L (ref 15–37)
BILIRUB SERPL-MCNC: 0.7 MG/DL (ref 0–1)
BUN SERPL-MCNC: 14 MG/DL (ref 7–20)
CALCIUM SERPL-MCNC: 9.3 MG/DL (ref 8.3–10.6)
CHLORIDE SERPL-SCNC: 101 MMOL/L (ref 99–110)
CHOLEST SERPL-MCNC: 181 MG/DL (ref 0–199)
CO2 SERPL-SCNC: 34 MMOL/L (ref 21–32)
CORTIS AM PEAK SERPL-MCNC: 5.6 UG/DL (ref 4.3–22.4)
CREAT SERPL-MCNC: 1.1 MG/DL (ref 0.9–1.3)
DEPRECATED RDW RBC AUTO: 14.3 % (ref 12.4–15.4)
ESTRADIOL SERPL-MCNC: 14 PG/ML
GFR SERPLBLD CREATININE-BSD FMLA CKD-EPI: >60 ML/MIN/{1.73_M2}
GLUCOSE SERPL-MCNC: 95 MG/DL (ref 70–99)
HCT VFR BLD AUTO: 50.4 % (ref 40.5–52.5)
HDLC SERPL-MCNC: 33 MG/DL (ref 40–60)
HGB BLD-MCNC: 16.9 G/DL (ref 13.5–17.5)
LDLC SERPL CALC-MCNC: 120 MG/DL
MCH RBC QN AUTO: 29.2 PG (ref 26–34)
MCHC RBC AUTO-ENTMCNC: 33.5 G/DL (ref 31–36)
MCV RBC AUTO: 87.2 FL (ref 80–100)
PLATELET # BLD AUTO: 134 K/UL (ref 135–450)
PLATELET BLD QL SMEAR: ADEQUATE
PMV BLD AUTO: 8.4 FL (ref 5–10.5)
POTASSIUM SERPL-SCNC: 5.1 MMOL/L (ref 3.5–5.1)
PROLACTIN SERPL IA-MCNC: 9.6 NG/ML
PROT SERPL-MCNC: 7.1 G/DL (ref 6.4–8.2)
PSA SERPL DL<=0.01 NG/ML-MCNC: 0.8 NG/ML (ref 0–4)
RBC # BLD AUTO: 5.77 M/UL (ref 4.2–5.9)
SODIUM SERPL-SCNC: 142 MMOL/L (ref 136–145)
T3FREE SERPL-MCNC: 4.1 PG/ML (ref 2.3–4.2)
T4 FREE SERPL-MCNC: 1.3 NG/DL (ref 0.9–1.8)
TRIGL SERPL-MCNC: 142 MG/DL (ref 0–150)
TSH SERPL DL<=0.005 MIU/L-ACNC: 5.04 UIU/ML (ref 0.27–4.2)
VLDLC SERPL CALC-MCNC: 28 MG/DL
WBC # BLD AUTO: 5.1 K/UL (ref 4–11)

## 2024-01-20 ENCOUNTER — TELEPHONE (OUTPATIENT)
Dept: ENDOCRINOLOGY | Age: 54
End: 2024-01-20

## 2024-01-20 LAB
GHRH SERPL-MCNC: <0.05 NG/ML (ref 0.05–3)
IGF-I SERPL-MCNC: 150 NG/ML (ref 61–210)
IGF-I Z-SCORE SERPL: 0.7
SHBG SERPL-SCNC: 81 NMOL/L (ref 19–76)
TESTOST FREE MFR SERPL: 1 % (ref 1.6–2.9)
TESTOST FREE SERPL-MCNC: 58 PG/ML (ref 47–244)
TESTOST SERPL-MCNC: 558 NG/DL (ref 300–890)

## 2024-01-21 NOTE — TELEPHONE ENCOUNTER
Please inform patient:  Testosterone test was very good.  IGF-I was good.  Growth hormone tends to fluctuate.  LDL cholesterol below 100 is indicated if there is a heart disease or diabetes.  Therefore 120s acceptable, but preferably always is better to have it lower.  Platelets slightly below normal, address with PCP.  Chemistry panel was very good.  Vitamin D, prolactin, PSA all good.  Thyroid test borderline abnormal.  As we discussed before, we can try a low-dose of levothyroxine to see if he feels better.  Let me know and I will send prescription.

## 2024-01-22 LAB
ANNOTATION COMMENT IMP: NORMAL
METANEPHS SERPL-SCNC: 0.39 NMOL/L (ref 0–0.49)
NORMETANEPHRINE SERPL-SCNC: 0.24 NMOL/L (ref 0–0.89)

## 2024-01-22 RX ORDER — LEVOTHYROXINE SODIUM 0.03 MG/1
25 TABLET ORAL DAILY
Qty: 30 TABLET | Refills: 3 | Status: SHIPPED | OUTPATIENT
Start: 2024-01-22

## 2024-01-22 NOTE — TELEPHONE ENCOUNTER
I sent prescription to pharmacy.  Please inform patient to take it first thing in the morning, on empty stomach, 30-60 minutes away from food, if he takes any multivitamin, calcium, iron, need to be  by 4 hours.  Also separate 4 hours from omeprazole.    Stop biotin 1 week prior to blood work.

## 2024-01-22 NOTE — TELEPHONE ENCOUNTER
Patient informed. Please sent levothyroxine to [facility] in [facility].

## 2024-01-24 LAB — ACTH PLAS-MCNC: 13 PG/ML (ref 7–63)

## 2024-02-01 LAB — MISCELLANEOUS LAB TEST ORDER: NORMAL

## 2024-02-14 ENCOUNTER — PATIENT MESSAGE (OUTPATIENT)
Dept: ENDOCRINOLOGY | Age: 54
End: 2024-02-14

## 2024-02-14 ENCOUNTER — OFFICE VISIT (OUTPATIENT)
Dept: FAMILY MEDICINE CLINIC | Age: 54
End: 2024-02-14
Payer: MEDICAID

## 2024-02-14 VITALS
SYSTOLIC BLOOD PRESSURE: 136 MMHG | OXYGEN SATURATION: 95 % | DIASTOLIC BLOOD PRESSURE: 74 MMHG | BODY MASS INDEX: 27.99 KG/M2 | HEIGHT: 69 IN | HEART RATE: 95 BPM | WEIGHT: 189 LBS

## 2024-02-14 DIAGNOSIS — F41.9 ANXIETY: ICD-10-CM

## 2024-02-14 DIAGNOSIS — K86.1 CHRONIC PANCREATITIS, UNSPECIFIED PANCREATITIS TYPE (HCC): ICD-10-CM

## 2024-02-14 DIAGNOSIS — M54.2 NECK PAIN: ICD-10-CM

## 2024-02-14 DIAGNOSIS — F51.01 PRIMARY INSOMNIA: ICD-10-CM

## 2024-02-14 DIAGNOSIS — M25.562 CHRONIC PAIN OF LEFT KNEE: Primary | ICD-10-CM

## 2024-02-14 DIAGNOSIS — G89.29 CHRONIC PAIN OF LEFT KNEE: Primary | ICD-10-CM

## 2024-02-14 PROCEDURE — G8484 FLU IMMUNIZE NO ADMIN: HCPCS | Performed by: FAMILY MEDICINE

## 2024-02-14 PROCEDURE — 1036F TOBACCO NON-USER: CPT | Performed by: FAMILY MEDICINE

## 2024-02-14 PROCEDURE — G8427 DOCREV CUR MEDS BY ELIG CLIN: HCPCS | Performed by: FAMILY MEDICINE

## 2024-02-14 PROCEDURE — 3017F COLORECTAL CA SCREEN DOC REV: CPT | Performed by: FAMILY MEDICINE

## 2024-02-14 PROCEDURE — 3075F SYST BP GE 130 - 139MM HG: CPT | Performed by: FAMILY MEDICINE

## 2024-02-14 PROCEDURE — G8417 CALC BMI ABV UP PARAM F/U: HCPCS | Performed by: FAMILY MEDICINE

## 2024-02-14 PROCEDURE — 3078F DIAST BP <80 MM HG: CPT | Performed by: FAMILY MEDICINE

## 2024-02-14 PROCEDURE — 99214 OFFICE O/P EST MOD 30 MIN: CPT | Performed by: FAMILY MEDICINE

## 2024-02-14 RX ORDER — ALPRAZOLAM 2 MG/1
2 TABLET ORAL 2 TIMES DAILY PRN
Qty: 45 TABLET | Refills: 2 | Status: SHIPPED | OUTPATIENT
Start: 2024-02-14 | End: 2024-05-14

## 2024-02-14 RX ORDER — CLONAZEPAM 1 MG/1
2 TABLET ORAL NIGHTLY PRN
Qty: 60 TABLET | Refills: 2 | Status: SHIPPED | OUTPATIENT
Start: 2024-02-14 | End: 2024-05-14

## 2024-02-14 NOTE — TELEPHONE ENCOUNTER
I spoke with patient.  Advised to take thyroid medication separate by 4 hours from multivitamin, calcium, iron, fiber supplements and omeprazole.  Regarding testosterone, we will do the testing between in the morning and compare with the testing he had after his sleep.

## 2024-02-14 NOTE — TELEPHONE ENCOUNTER
From: Juan Francisco Ruiz  To: Dr. Ariella Lee  Sent: 2/14/2024 3:35 PM EST  Subject: Thyroid med    Hi Doc (or Deena),    I apologize. I didn’t write the instructions down when we spoke about the thyroid med. I recall most but want to make certain I have it correct. I recall take in am but not the rest. Would you please provide the other information?   Also I have to be awake all night so that I can be at early morning appointment. It’s an opportunity for me to do the testosterone test early. If there’s time, would you please ask the doc if she wants me to have the blood taken with no sleep or if it’s preferable to do what we’ve discussed and I get some sleep and then go get my blood taken. It may be 12 or 1 o’clock in the Afternoon.    Thank you.    Jeet

## 2024-02-14 NOTE — PROGRESS NOTES
Juan Francisco Ruiz (:  1970) is a 53 y.o. male,Established patient, here for evaluation of the following chief complaint(s):  Medication Refill         ASSESSMENT/PLAN:  Juan Francisco was seen today for medication refill.    Diagnoses and all orders for this visit:    Chronic pain of left knee  Xray to eval. Referred to PT  Primary insomnia  -     clonazePAM (KLONOPIN) 1 MG tablet; Take 2 tablets by mouth nightly as needed (sleep) for up to 90 days.  Stable on klonopin  Controlled Substances Monitoring: Periodic Controlled Substance Monitoring: Possible medication side effects, risk of tolerance/dependence & alternative treatments discussed., No signs of potential drug abuse or diversion identified. (Gary Puente MD)   Chronic pancreatitis, unspecified pancreatitis type (HCC)  Stable. Following with gi  Anxiety  -     ALPRAZolam (XANAX) 2 MG tablet; Take 1 tablet by mouth 2 times daily as needed for Anxiety for up to 90 days.  Stable on xanax.  Taking as prescribed  Neck pain  -     External Referral To Physical Therapy  -     Cancel: XR CERVICAL SPINE (4-5 VIEWS); Future  -     XR CERVICAL SPINE (2-3 VIEWS); Future       Controlled Substances Monitoring: Periodic Controlled Substance Monitoring: Possible medication side effects, risk of tolerance/dependence & alternative treatments discussed., No signs of potential drug abuse or diversion identified. (Gary Puente MD)   No follow-ups on file.         Subjective   SUBJECTIVE/OBJECTIVE:  HPI  Pt is a of 53 y.o. male comes in today with   Chief Complaint   Patient presents with    Medication Refill   Taking meds as prescribed  Notes knee and neck pain  Vitals:    24 1603   BP: 136/74   Pulse: 95   SpO2: 95%   Weight: 85.7 kg (189 lb)   Height: 1.753 m (5' 9\")       Past Medical History:Reviewed  Medications:Reviewed.  Allergies   Allergen Reactions    Ambien [Zolpidem] Other (See Comments)     Blackout lists    Haloperidol Hallucinations    Trazodone

## 2024-02-14 NOTE — TELEPHONE ENCOUNTER
Please advise on this:  Also I have to be awake all night so that I can be at early morning appointment. It’s an opportunity for me to do the testosterone test early. If there’s time, would you please ask the doc if she wants me to have the blood taken with no sleep or if it’s preferable to do what we’ve discussed and I get some sleep and then go get my blood taken. It may be 12 or 1 o’clock in the Afternoon.     Please respond back to pt via mychart msg or call as he plans to go in am for labs

## 2024-02-15 ENCOUNTER — HOSPITAL ENCOUNTER (OUTPATIENT)
Dept: GENERAL RADIOLOGY | Age: 54
Discharge: HOME | End: 2024-02-15
Payer: MEDICAID

## 2024-02-15 ENCOUNTER — HOSPITAL ENCOUNTER (OUTPATIENT)
Age: 54
Discharge: HOME | End: 2024-02-15
Payer: MEDICAID

## 2024-02-15 DIAGNOSIS — E23.0 HYPOGONADOTROPIC HYPOGONADISM (HCC): ICD-10-CM

## 2024-02-15 DIAGNOSIS — M54.2 NECK PAIN: ICD-10-CM

## 2024-02-15 DIAGNOSIS — M25.562 CHRONIC PAIN OF LEFT KNEE: ICD-10-CM

## 2024-02-15 DIAGNOSIS — G89.29 CHRONIC PAIN OF LEFT KNEE: ICD-10-CM

## 2024-02-15 PROCEDURE — 73560 X-RAY EXAM OF KNEE 1 OR 2: CPT

## 2024-02-15 PROCEDURE — 72040 X-RAY EXAM NECK SPINE 2-3 VW: CPT

## 2024-02-15 PROCEDURE — 84402 ASSAY OF FREE TESTOSTERONE: CPT

## 2024-02-15 PROCEDURE — 84403 ASSAY OF TOTAL TESTOSTERONE: CPT

## 2024-02-15 PROCEDURE — 84270 ASSAY OF SEX HORMONE GLOBUL: CPT

## 2024-02-17 LAB
SHBG SERPL-SCNC: 66 NMOL/L (ref 19–76)
TESTOST FREE MFR SERPL: 1.3 % (ref 1.6–2.9)
TESTOST FREE SERPL-MCNC: 93 PG/ML (ref 47–244)
TESTOST SERPL-MCNC: 720 NG/DL (ref 300–890)
TESTOSTERONE.FREE+WB SERPL-MCNC: 255 NG/DL (ref 131–682)

## 2024-02-18 ENCOUNTER — TELEPHONE (OUTPATIENT)
Dept: ENDOCRINOLOGY | Age: 54
End: 2024-02-18

## 2024-02-18 NOTE — TELEPHONE ENCOUNTER
Please inform patient that testosterone result is very good.  Total testosterone was 720.  Overall testosterone results are good.

## 2024-03-14 RX ORDER — SILDENAFIL 100 MG/1
100 TABLET, FILM COATED ORAL DAILY
Qty: 30 TABLET | Refills: 1 | Status: SHIPPED | OUTPATIENT
Start: 2024-03-14

## 2024-03-14 NOTE — TELEPHONE ENCOUNTER
Medication:   Requested Prescriptions     Pending Prescriptions Disp Refills    fluticasone (FLONASE) 50 MCG/ACT nasal spray [Pharmacy Med Name: FLUTICASONE PROP 50 MCG SPRAY]       Sig: SPRAY 2 SPRAYS IN EACH NOSTRIL ONCE DAILY    Azelastine HCl 137 MCG/SPRAY SOLN [Pharmacy Med Name: AZELASTINE 0.1% (137 MCG) SPRY] 30 mL 2     Sig: SPRAY 2 SPRAYS IN EACH NOSTRIL TWICE DAILY        Last Filled:      Patient Phone Number: [phone] (home)     Last appt: 2/14/2024   Next appt: 5/1/2024    Last O[facility]RS:       2/14/2024     4:09 PM   RX Monitoring   Periodic Controlled Substance Monitoring Possible medication side effects, risk of tolerance/dependence & alternative treatments discussed.;No signs of potential drug abuse or diversion identified.

## 2024-03-15 RX ORDER — AZELASTINE HYDROCHLORIDE 137 UG/1
SPRAY, METERED NASAL
Qty: 30 ML | Refills: 2 | Status: SHIPPED | OUTPATIENT
Start: 2024-03-15

## 2024-03-15 RX ORDER — FLUTICASONE PROPIONATE 50 MCG
SPRAY, SUSPENSION (ML) NASAL
Qty: 16 G | Refills: 2 | Status: SHIPPED | OUTPATIENT
Start: 2024-03-15

## 2024-04-18 ENCOUNTER — TELEPHONE (OUTPATIENT)
Dept: ENDOCRINOLOGY | Age: 54
End: 2024-04-18

## 2024-04-18 ENCOUNTER — TELEMEDICINE (OUTPATIENT)
Dept: ENDOCRINOLOGY | Age: 54
End: 2024-04-18

## 2024-04-18 ENCOUNTER — PATIENT MESSAGE (OUTPATIENT)
Dept: CARDIOLOGY CLINIC | Age: 54
End: 2024-04-18

## 2024-04-18 DIAGNOSIS — R53.83 OTHER FATIGUE: ICD-10-CM

## 2024-04-18 DIAGNOSIS — E66.3 OVERWEIGHT (BMI 25.0-29.9): ICD-10-CM

## 2024-04-18 DIAGNOSIS — Z86.39 HISTORY OF ADRENAL INSUFFICIENCY: ICD-10-CM

## 2024-04-18 DIAGNOSIS — E03.9 ACQUIRED HYPOTHYROIDISM: ICD-10-CM

## 2024-04-18 DIAGNOSIS — N52.9 ERECTILE DYSFUNCTION, UNSPECIFIED ERECTILE DYSFUNCTION TYPE: ICD-10-CM

## 2024-04-18 DIAGNOSIS — L65.9 ALOPECIA: ICD-10-CM

## 2024-04-18 DIAGNOSIS — E27.40 ADRENAL INSUFFICIENCY (HCC): ICD-10-CM

## 2024-04-18 DIAGNOSIS — E23.0 HYPOGONADOTROPIC HYPOGONADISM (HCC): Primary | ICD-10-CM

## 2024-04-18 DIAGNOSIS — E78.49 OTHER HYPERLIPIDEMIA: ICD-10-CM

## 2024-04-18 DIAGNOSIS — R79.89 ELEVATED INSULIN-LIKE GROWTH FACTOR 1 (IGF-1) LEVEL: ICD-10-CM

## 2024-04-18 DIAGNOSIS — E55.9 VITAMIN D DEFICIENCY: ICD-10-CM

## 2024-04-18 DIAGNOSIS — D35.2 PITUITARY MICROADENOMA (HCC): ICD-10-CM

## 2024-04-18 RX ORDER — ALBUTEROL SULFATE 90 UG/1
4 AEROSOL, METERED RESPIRATORY (INHALATION) PRN
OUTPATIENT
Start: 2024-04-19

## 2024-04-18 RX ORDER — EPINEPHRINE 1 MG/ML
0.3 INJECTION, SOLUTION, CONCENTRATE INTRAVENOUS PRN
OUTPATIENT
Start: 2024-04-19

## 2024-04-18 RX ORDER — TESTOSTERONE 30 MG/1.5ML
SOLUTION TOPICAL
Qty: 2 EACH | Refills: 3 | Status: SHIPPED | OUTPATIENT
Start: 2024-04-18 | End: 2024-07-18

## 2024-04-18 RX ORDER — HEPARIN SODIUM (PORCINE) LOCK FLUSH IV SOLN 100 UNIT/ML 100 UNIT/ML
500 SOLUTION INTRAVENOUS PRN
OUTPATIENT
Start: 2024-04-19

## 2024-04-18 RX ORDER — ACETAMINOPHEN 325 MG/1
650 TABLET ORAL
OUTPATIENT
Start: 2024-04-19

## 2024-04-18 RX ORDER — SILDENAFIL 100 MG/1
100 TABLET, FILM COATED ORAL DAILY
Qty: 30 TABLET | Refills: 1 | Status: SHIPPED | OUTPATIENT
Start: 2024-04-18

## 2024-04-18 RX ORDER — SODIUM CHLORIDE 9 MG/ML
INJECTION, SOLUTION INTRAVENOUS CONTINUOUS
OUTPATIENT
Start: 2024-04-19

## 2024-04-18 RX ORDER — DIPHENHYDRAMINE HYDROCHLORIDE 50 MG/ML
50 INJECTION INTRAMUSCULAR; INTRAVENOUS
OUTPATIENT
Start: 2024-04-19

## 2024-04-18 RX ORDER — SODIUM CHLORIDE 9 MG/ML
5-250 INJECTION, SOLUTION INTRAVENOUS PRN
OUTPATIENT
Start: 2024-04-19

## 2024-04-18 RX ORDER — SODIUM CHLORIDE 0.9 % (FLUSH) 0.9 %
5-40 SYRINGE (ML) INJECTION PRN
OUTPATIENT
Start: 2024-04-19

## 2024-04-18 RX ORDER — ONDANSETRON 2 MG/ML
8 INJECTION INTRAMUSCULAR; INTRAVENOUS
OUTPATIENT
Start: 2024-04-19

## 2024-04-18 RX ORDER — COSYNTROPIN 0.25 MG/ML
250 INJECTION, POWDER, FOR SOLUTION INTRAMUSCULAR; INTRAVENOUS ONCE
OUTPATIENT
Start: 2024-04-19 | End: 2024-04-19

## 2024-04-18 RX ORDER — MINOXIDIL 5 %
SOLUTION, NON-ORAL TOPICAL
Qty: 100 ML | Refills: 0 | Status: SHIPPED | OUTPATIENT
Start: 2024-04-18

## 2024-04-18 RX ORDER — FAMOTIDINE 10 MG/ML
20 INJECTION, SOLUTION INTRAVENOUS
OUTPATIENT
Start: 2024-04-19

## 2024-04-18 NOTE — PROGRESS NOTES
5.76-4.86-5.04  Free T4 1.3  Free T3 4.1  HAMA <6  TPO antibody, thyroglobulin antibody negative  -TSH  -Free T4  -Free T3    9. Erectile dysfunction  Related to medications  Continue Viagra.  - Viagra as needed    10. Overweight  Stable now.    Reviewed and/or ordered clinical lab results Yes  Reviewed and/or ordered radiology tests Yes   Reviewed and/or ordered other diagnostic tests No  Discussed test results with performing physician No  Independently reviewed image, tracing, or specimen No  Made a decision to obtain old records No  Reviewed and summarized old records Yes   TSH 2.39  IGF-1 131  Obtained history from other than patient No    Juan Francisco Ruiz was counseled regarding symptoms of hypogonadism, pituitary disease, adrenal insufficiency, endocrine causes of fatigue, depression, erectile dysfunction diagnosis, course and complications of disease if inadequately treated, side effects of medications, diagnosis, treatment options, and prognosis, risks, benefits, complications, and alternatives of treatment, labs, imaging and other studies and treatment targets and goals, testing for adrenal insufficiency, hypothyroidism diagnosis and treatment, thyroid hormone replacement therapy, side effects of medication, hyperlipidemia, testosterone replacement therapy, adrenal stim test, thyroid issues, hair loss.  He understands instructions and counseling.    Juan Francisco Ruiz, was evaluated through a synchronous (real-time) audio-video encounter. The patient (or guardian if applicable) is aware that this is a billable service, which includes applicable co-pays. This Virtual Visit was conducted with patient's (and/or legal guardian's) consent. Patient identification was verified, and a caregiver was present when appropriate.   The patient was located at Home: [address] [place] [address]  Provider was located at Facility (Appt Dept): [address] [place] [id number]  Confirm you are

## 2024-04-19 ENCOUNTER — TELEPHONE (OUTPATIENT)
Dept: ENDOCRINOLOGY | Age: 54
End: 2024-04-19

## 2024-04-19 NOTE — TELEPHONE ENCOUNTER
Submitted PA for Minoxidil  Via CMM Key: [id number]    STATUS: Drug is not a payable drug     This is a plan exclusion.     If this requires a response please respond to the pool ( P MHCX PSC MEDICATION PRE-AUTH).      Thank you please advise patient.

## 2024-04-19 NOTE — TELEPHONE ENCOUNTER
From: Juan Francisco Ruiz  To: Dr. Vern Garcia  Sent: 4/18/2024 7:36 PM EDT  Subject: Appointment and follow up.     Hi. It seems that it is time for my annual visit. Can you confirm? If so. Shall we schedule?  My endocrinologist said today she ran something in my chart and it said I am medium risk for heart disease. She suggested I follow up with you on this.     Jeet

## 2024-04-19 NOTE — TELEPHONE ENCOUNTER
Please send my referral with my note to [facility].  See scanned document, referral was placed before, but they did not accept it because absent testosterone test.  However, nobody called patient to schedule when we we sent testosterone to them.  Please send demographics, referral and lab/note again.

## 2024-04-22 NOTE — TELEPHONE ENCOUNTER
LVM to return call    Not sure if he was under the impression if insurance may pay for it? Insurance states this is a plan exclusion.

## 2024-04-22 NOTE — TELEPHONE ENCOUNTER
Faxed. Referral, demographics, last 2 OVN, all available labs ordered by MD in past 2 years, all imaging ordered by MD and an MRI.

## 2024-05-13 DIAGNOSIS — F51.01 PRIMARY INSOMNIA: ICD-10-CM

## 2024-05-13 DIAGNOSIS — F41.9 ANXIETY: ICD-10-CM

## 2024-05-13 RX ORDER — LEVOTHYROXINE SODIUM 0.03 MG/1
25 TABLET ORAL DAILY
Qty: 30 TABLET | Refills: 3 | Status: SHIPPED | OUTPATIENT
Start: 2024-05-13

## 2024-05-13 NOTE — TELEPHONE ENCOUNTER
Medication:   Requested Prescriptions     Pending Prescriptions Disp Refills    clonazePAM (KLONOPIN) 1 MG tablet [Pharmacy Med Name: clonazePAM 1 MG TABLET] 60 tablet      Sig: TAKE TWO TABLETS BY MOUTH ONCE NIGHTLY AS NEEDED FOR SLEEP    ALPRAZolam (XANAX) 2 MG tablet [Pharmacy Med Name: ALPRAZOLAM 2 MG TABLET] 45 tablet      Sig: Take 1 tablet by mouth 2 times daily.       Last Filled:  2/14/24    Patient Phone Number: [phone] (home)     Last appt: 2/14/2024   Next appt: 5/14/2024    Last Labs DM:   Lab Results   Component Value Date/Time    LABA1C 5.4 05/23/2016 09:20 PM     Last Lipid:   Lab Results   Component Value Date/Time    CHOL 181 01/18/2024 02:38 PM    TRIG 142 01/18/2024 02:38 PM    HDL 33 01/18/2024 02:38 PM    HDL 36 05/11/2012 10:08 AM     Last PSA:   Lab Results   Component Value Date/Time    PSA 0.80 01/18/2024 02:38 PM     Last Thyroid:   Lab Results   Component Value Date/Time    TSH 5.04 01/18/2024 02:38 PM    FT3 4.1 01/18/2024 02:38 PM    R3MVMSO 1.38 02/11/2022 08:36 AM    T4FREE 1.3 01/18/2024 02:38 PM    J6WXSRK 6.3 02/11/2022 08:36 AM

## 2024-05-14 ENCOUNTER — OFFICE VISIT (OUTPATIENT)
Dept: FAMILY MEDICINE CLINIC | Age: 54
End: 2024-05-14
Payer: MEDICAID

## 2024-05-14 VITALS
BODY MASS INDEX: 28.58 KG/M2 | OXYGEN SATURATION: 95 % | HEART RATE: 95 BPM | SYSTOLIC BLOOD PRESSURE: 138 MMHG | DIASTOLIC BLOOD PRESSURE: 80 MMHG | WEIGHT: 193 LBS | HEIGHT: 69 IN

## 2024-05-14 DIAGNOSIS — R09.81 CHRONIC NASAL CONGESTION: Primary | ICD-10-CM

## 2024-05-14 DIAGNOSIS — F51.01 PRIMARY INSOMNIA: ICD-10-CM

## 2024-05-14 DIAGNOSIS — F41.9 ANXIETY: ICD-10-CM

## 2024-05-14 PROCEDURE — 1036F TOBACCO NON-USER: CPT | Performed by: FAMILY MEDICINE

## 2024-05-14 PROCEDURE — 3079F DIAST BP 80-89 MM HG: CPT | Performed by: FAMILY MEDICINE

## 2024-05-14 PROCEDURE — 99213 OFFICE O/P EST LOW 20 MIN: CPT | Performed by: FAMILY MEDICINE

## 2024-05-14 PROCEDURE — G8427 DOCREV CUR MEDS BY ELIG CLIN: HCPCS | Performed by: FAMILY MEDICINE

## 2024-05-14 PROCEDURE — 3075F SYST BP GE 130 - 139MM HG: CPT | Performed by: FAMILY MEDICINE

## 2024-05-14 PROCEDURE — 3017F COLORECTAL CA SCREEN DOC REV: CPT | Performed by: FAMILY MEDICINE

## 2024-05-14 PROCEDURE — G8417 CALC BMI ABV UP PARAM F/U: HCPCS | Performed by: FAMILY MEDICINE

## 2024-05-14 RX ORDER — CLONAZEPAM 1 MG/1
2 TABLET ORAL NIGHTLY PRN
Qty: 60 TABLET | Refills: 2 | Status: SHIPPED | OUTPATIENT
Start: 2024-05-14 | End: 2024-08-12

## 2024-05-14 RX ORDER — CLONAZEPAM 1 MG/1
TABLET ORAL
Qty: 60 TABLET | OUTPATIENT
Start: 2024-05-14

## 2024-05-14 RX ORDER — ALPRAZOLAM 2 MG/1
2 TABLET ORAL 2 TIMES DAILY PRN
Qty: 45 TABLET | Refills: 2 | Status: SHIPPED | OUTPATIENT
Start: 2024-05-14 | End: 2024-08-12

## 2024-05-14 RX ORDER — ALPRAZOLAM 2 MG/1
2 TABLET ORAL 2 TIMES DAILY
Qty: 45 TABLET | OUTPATIENT
Start: 2024-05-14

## 2024-05-14 NOTE — PROGRESS NOTES
Juan Francisco Ruiz (:  1970) is a 53 y.o. male,Established patient, here for evaluation of the following chief complaint(s):  Medication Check      Assessment & Plan   ASSESSMENT/PLAN:  Juan Francisco was seen today for medication check.    Diagnoses and all orders for this visit:    Chronic nasal congestion  Not well controlled. Will follow up with ent. Call back if referral needed  Anxiety  -     ALPRAZolam (XANAX) 2 MG tablet; Take 1 tablet by mouth 2 times daily as needed for Anxiety for up to 90 days.  Stable on xanax  Controlled Substances Monitoring: Periodic Controlled Substance Monitoring: Possible medication side effects, risk of tolerance/dependence & alternative treatments discussed., No signs of potential drug abuse or diversion identified. (Gary Puente MD)   Primary insomnia  -     clonazePAM (KLONOPIN) 1 MG tablet; Take 2 tablets by mouth nightly as needed (sleep) for up to 90 days.  Stable on klonopin       No follow-ups on file.         Subjective   SUBJECTIVE/OBJECTIVE:  HPI  Pt is a of 53 y.o. male comes in today with   Chief Complaint   Patient presents with    Medication Check     Chronic sinus congestion. Worse on the right.    Some relief with nasal sprays.    Anxiety and insomnia stable on current meds.  Taking as prescribed.  No side effects   Vitals:    24 1543   BP: 138/80   Pulse: 95   SpO2: 95%   Weight: 87.5 kg (193 lb)   Height: 1.753 m (5' 9\")       Review of Systems       Objective   Physical Exam         An electronic signature was used to authenticate this note.    --Gary Puente MD

## 2024-06-12 RX ORDER — AZELASTINE HYDROCHLORIDE 137 UG/1
SPRAY, METERED NASAL
Qty: 30 ML | Refills: 2 | Status: SHIPPED | OUTPATIENT
Start: 2024-06-12

## 2024-06-12 RX ORDER — FLUTICASONE PROPIONATE 50 MCG
SPRAY, SUSPENSION (ML) NASAL
Qty: 1 EACH | Refills: 2 | Status: SHIPPED | OUTPATIENT
Start: 2024-06-12

## 2024-06-12 RX ORDER — SILDENAFIL 100 MG/1
100 TABLET, FILM COATED ORAL DAILY
Qty: 30 TABLET | Refills: 1 | Status: SHIPPED | OUTPATIENT
Start: 2024-06-12

## 2024-06-12 NOTE — TELEPHONE ENCOUNTER
Medication:   Requested Prescriptions     Pending Prescriptions Disp Refills    Azelastine HCl 137 MCG/SPRAY SOLN [Pharmacy Med Name: AZELASTINE 0.1% (137 MCG) SPRY] 30 mL 2     Sig: SPRAY 2 SPRAYS IN EACH NOSTRIL TWICE DAILY    fluticasone (FLONASE) 50 MCG/ACT nasal spray [Pharmacy Med Name: FLUTICASONE PROP 50 MCG SPRAY] 1 each 2     Sig: SPRAY 2 SPRAYS IN EACH NOSTRIL ONCE DAILY        Last Filled:  3/15/24    Patient Phone Number: [phone] (home)     Last appt: 5/14/2024   Next appt: 7/29/2024    Last OARRS:       5/14/2024     3:51 PM   RX Monitoring   Periodic Controlled Substance Monitoring Possible medication side effects, risk of tolerance/dependence & alternative treatments discussed.;No signs of potential drug abuse or diversion identified.

## 2024-07-11 NOTE — TELEPHONE ENCOUNTER
Called and spoke to patient informed patient that it has been a year since patient has an appointment. Patient scheduled for 9/4/24 at 2:45 pm and refills pended to Dr. Mosher.

## 2024-07-15 RX ORDER — KETOCONAZOLE 20 MG/ML
SHAMPOO TOPICAL
Qty: 120 ML | Refills: 11 | Status: SHIPPED | OUTPATIENT
Start: 2024-07-15

## 2024-07-18 ENCOUNTER — OFFICE VISIT (OUTPATIENT)
Dept: ENDOCRINOLOGY | Age: 54
End: 2024-07-18

## 2024-07-18 VITALS
HEIGHT: 69 IN | SYSTOLIC BLOOD PRESSURE: 132 MMHG | WEIGHT: 191.8 LBS | TEMPERATURE: 98 F | HEART RATE: 74 BPM | RESPIRATION RATE: 14 BRPM | DIASTOLIC BLOOD PRESSURE: 98 MMHG | OXYGEN SATURATION: 96 % | BODY MASS INDEX: 28.41 KG/M2

## 2024-07-18 DIAGNOSIS — E23.0 HYPOGONADOTROPIC HYPOGONADISM (HCC): Primary | ICD-10-CM

## 2024-07-18 DIAGNOSIS — D35.2 PITUITARY MICROADENOMA (HCC): ICD-10-CM

## 2024-07-18 DIAGNOSIS — E03.9 ACQUIRED HYPOTHYROIDISM: ICD-10-CM

## 2024-07-18 DIAGNOSIS — E78.49 OTHER HYPERLIPIDEMIA: ICD-10-CM

## 2024-07-18 DIAGNOSIS — R53.83 OTHER FATIGUE: ICD-10-CM

## 2024-07-18 DIAGNOSIS — E55.9 VITAMIN D DEFICIENCY: ICD-10-CM

## 2024-07-18 DIAGNOSIS — Z91.89 AT HIGH RISK FOR CARDIOVASCULAR DISEASE: ICD-10-CM

## 2024-07-18 DIAGNOSIS — Z86.39 HISTORY OF ADRENAL INSUFFICIENCY: ICD-10-CM

## 2024-07-18 DIAGNOSIS — R79.89 ELEVATED INSULIN-LIKE GROWTH FACTOR 1 (IGF-1) LEVEL: ICD-10-CM

## 2024-07-18 DIAGNOSIS — N52.9 ERECTILE DYSFUNCTION, UNSPECIFIED ERECTILE DYSFUNCTION TYPE: ICD-10-CM

## 2024-07-18 DIAGNOSIS — E66.3 OVERWEIGHT (BMI 25.0-29.9): ICD-10-CM

## 2024-07-18 RX ORDER — FAMOTIDINE 10 MG/ML
20 INJECTION, SOLUTION INTRAVENOUS
OUTPATIENT
Start: 2024-07-19

## 2024-07-18 RX ORDER — ACETAMINOPHEN 325 MG/1
650 TABLET ORAL
OUTPATIENT
Start: 2024-07-19

## 2024-07-18 RX ORDER — SODIUM CHLORIDE 0.9 % (FLUSH) 0.9 %
5-40 SYRINGE (ML) INJECTION PRN
OUTPATIENT
Start: 2024-07-19

## 2024-07-18 RX ORDER — COSYNTROPIN 0.25 MG/ML
250 INJECTION, POWDER, FOR SOLUTION INTRAMUSCULAR; INTRAVENOUS ONCE
OUTPATIENT
Start: 2024-07-19 | End: 2024-07-19

## 2024-07-18 RX ORDER — ALBUTEROL SULFATE 90 UG/1
4 AEROSOL, METERED RESPIRATORY (INHALATION) PRN
OUTPATIENT
Start: 2024-07-19

## 2024-07-18 RX ORDER — LEVOTHYROXINE SODIUM 0.03 MG/1
25 TABLET ORAL DAILY
Qty: 30 TABLET | Refills: 3 | Status: SHIPPED | OUTPATIENT
Start: 2024-07-18

## 2024-07-18 RX ORDER — HEPARIN SODIUM (PORCINE) LOCK FLUSH IV SOLN 100 UNIT/ML 100 UNIT/ML
500 SOLUTION INTRAVENOUS PRN
OUTPATIENT
Start: 2024-07-19

## 2024-07-18 RX ORDER — SODIUM CHLORIDE 9 MG/ML
INJECTION, SOLUTION INTRAVENOUS CONTINUOUS
OUTPATIENT
Start: 2024-07-19

## 2024-07-18 RX ORDER — DIPHENHYDRAMINE HYDROCHLORIDE 50 MG/ML
50 INJECTION INTRAMUSCULAR; INTRAVENOUS
OUTPATIENT
Start: 2024-07-19

## 2024-07-18 RX ORDER — EPINEPHRINE 1 MG/ML
0.3 INJECTION, SOLUTION, CONCENTRATE INTRAVENOUS PRN
OUTPATIENT
Start: 2024-07-19

## 2024-07-18 RX ORDER — ONDANSETRON 2 MG/ML
8 INJECTION INTRAMUSCULAR; INTRAVENOUS
OUTPATIENT
Start: 2024-07-19

## 2024-07-18 RX ORDER — SODIUM CHLORIDE 9 MG/ML
5-250 INJECTION, SOLUTION INTRAVENOUS PRN
OUTPATIENT
Start: 2024-07-19

## 2024-07-18 NOTE — PROGRESS NOTES
obtain results of Cortrosyn stimulation test first.  TSH 5.76-4.86-5.04  Free T4 1.3  Free T3 4.1  HAMA <6  TPO antibody, thyroglobulin antibody negative  -TSH  -Free T4  -Free T3     9. Erectile dysfunction  Related to medications  Continue Viagra.  - Viagra as needed     10. Overweight  Stable now.    11. At high risk for cardiovascular disease  Has some chest discomfort  Recommend CT calcium scoring    Reviewed and/or ordered clinical lab results Yes  Reviewed and/or ordered radiology tests Yes   Reviewed and/or ordered other diagnostic tests No  Discussed test results with performing physician No  Independently reviewed image, tracing, or specimen No  Made a decision to obtain old records No  Reviewed and summarized old records Yes   TSH 2.39  IGF-1 131  Obtained history from other than patient No    Juan Francisco Ruiz was counseled regarding symptoms of hypogonadism, pituitary disease, adrenal insufficiency, endocrine causes of fatigue, depression, erectile dysfunction diagnosis, course and complications of disease if inadequately treated, side effects of medications, diagnosis, treatment options, and prognosis, risks, benefits, complications, and alternatives of treatment, labs, imaging and other studies and treatment targets and goals, testing for adrenal insufficiency, hypothyroidism diagnosis and treatment, thyroid hormone replacement therapy, side effects of medication, hyperlipidemia, testosterone replacement therapy, Xyosted.  He understands instructions and counseling.    Total time I spent for this encounter 40 minutes    Return in about 3 months (around 10/18/2024) for hypogonadism.    Electronically signed by Ariella Lee MD on 7/18/2024 at 2:28 PM

## 2024-08-05 DIAGNOSIS — F51.01 PRIMARY INSOMNIA: ICD-10-CM

## 2024-08-05 DIAGNOSIS — F41.9 ANXIETY: ICD-10-CM

## 2024-08-05 DIAGNOSIS — E23.0 HYPOGONADOTROPIC HYPOGONADISM (HCC): ICD-10-CM

## 2024-08-06 RX ORDER — CLONAZEPAM 1 MG/1
2 TABLET ORAL NIGHTLY PRN
Qty: 30 TABLET | Refills: 0 | Status: SHIPPED | OUTPATIENT
Start: 2024-08-06 | End: 2024-08-21

## 2024-08-06 RX ORDER — ALPRAZOLAM 2 MG/1
2 TABLET ORAL 2 TIMES DAILY PRN
Qty: 22 TABLET | Refills: 0 | Status: SHIPPED | OUTPATIENT
Start: 2024-08-06 | End: 2024-08-21

## 2024-08-06 RX ORDER — TESTOSTERONE 30 MG/1.5ML
SOLUTION TOPICAL
Qty: 2 EACH | Refills: 0 | Status: SHIPPED | OUTPATIENT
Start: 2024-08-06 | End: 2024-09-02

## 2024-08-06 RX ORDER — SILDENAFIL 100 MG/1
100 TABLET, FILM COATED ORAL DAILY
Qty: 30 TABLET | Refills: 1 | Status: SHIPPED | OUTPATIENT
Start: 2024-08-06

## 2024-08-06 NOTE — TELEPHONE ENCOUNTER
Medication:   Requested Prescriptions     Pending Prescriptions Disp Refills    ALPRAZolam (XANAX) 2 MG tablet [Pharmacy Med Name: ALPRAZOLAM 2 MG TABLET] 45 tablet      Sig: TAKE 1 TABLET BY MOUTH 2 TIMES A DAY AS NEEDED FOR ANXIETY    clonazePAM (KLONOPIN) 1 MG tablet [Pharmacy Med Name: clonazePAM 1 MG TABLET] 60 tablet      Sig: TAKE 2 TABLETS BY MOUTH NIGHTLY AS NEEDED FOR SLEEP FOR UP TO 90 DAYS        Last Filled:  5/14/24    Patient Phone Number: [phone] (home)     Last appt: 5/14/2024   Next appt: 8/8/2024    Last OARRS:       5/14/2024     3:51 PM   RX Monitoring   Periodic Controlled Substance Monitoring Possible medication side effects, risk of tolerance/dependence & alternative treatments discussed.;No signs of potential drug abuse or diversion identified.

## 2024-08-14 ENCOUNTER — OFFICE VISIT (OUTPATIENT)
Dept: FAMILY MEDICINE CLINIC | Age: 54
End: 2024-08-14
Payer: MEDICAID

## 2024-08-14 VITALS
DIASTOLIC BLOOD PRESSURE: 70 MMHG | HEART RATE: 94 BPM | BODY MASS INDEX: 28.88 KG/M2 | OXYGEN SATURATION: 96 % | HEIGHT: 69 IN | WEIGHT: 195 LBS | SYSTOLIC BLOOD PRESSURE: 114 MMHG

## 2024-08-14 DIAGNOSIS — F41.9 ANXIETY: ICD-10-CM

## 2024-08-14 DIAGNOSIS — R07.9 CHEST PAIN, UNSPECIFIED TYPE: Primary | ICD-10-CM

## 2024-08-14 DIAGNOSIS — F51.01 PRIMARY INSOMNIA: ICD-10-CM

## 2024-08-14 PROCEDURE — 99214 OFFICE O/P EST MOD 30 MIN: CPT | Performed by: FAMILY MEDICINE

## 2024-08-14 PROCEDURE — 3017F COLORECTAL CA SCREEN DOC REV: CPT | Performed by: FAMILY MEDICINE

## 2024-08-14 PROCEDURE — G8427 DOCREV CUR MEDS BY ELIG CLIN: HCPCS | Performed by: FAMILY MEDICINE

## 2024-08-14 PROCEDURE — 3078F DIAST BP <80 MM HG: CPT | Performed by: FAMILY MEDICINE

## 2024-08-14 PROCEDURE — G8417 CALC BMI ABV UP PARAM F/U: HCPCS | Performed by: FAMILY MEDICINE

## 2024-08-14 PROCEDURE — 3074F SYST BP LT 130 MM HG: CPT | Performed by: FAMILY MEDICINE

## 2024-08-14 PROCEDURE — 1036F TOBACCO NON-USER: CPT | Performed by: FAMILY MEDICINE

## 2024-08-14 RX ORDER — CLONAZEPAM 1 MG/1
2 TABLET ORAL NIGHTLY PRN
Qty: 30 TABLET | Refills: 0 | Status: SHIPPED | OUTPATIENT
Start: 2024-08-14 | End: 2024-08-29

## 2024-08-14 RX ORDER — ALPRAZOLAM 2 MG/1
2 TABLET ORAL 2 TIMES DAILY PRN
Qty: 22 TABLET | Refills: 0 | Status: SHIPPED | OUTPATIENT
Start: 2024-08-14 | End: 2024-08-29

## 2024-08-14 NOTE — PROGRESS NOTES
Juan Francisco uRiz (:  1970) is a 53 y.o. male,Established patient, here for evaluation of the following chief complaint(s):  Follow-up and Medication Management      Assessment & Plan   ASSESSMENT/PLAN:  Juan Francisco Richter" was seen today for follow-up and medication management.    Diagnoses and all orders for this visit:    Chest pain, unspecified type  Sounds like anxiety. Calcium score previously ordered. Encouraged getting this done to stratify risk  Primary insomnia  -     clonazePAM (KLONOPIN) 1 MG tablet; Take 2 tablets by mouth nightly as needed for Anxiety for up to 15 days. Max Daily Amount: 2 mg  Stable on klonopin  Anxiety  -     ALPRAZolam (XANAX) 2 MG tablet; Take 1 tablet by mouth 2 times daily as needed for Anxiety for up to 15 days. Max Daily Amount: 4 mg  Stable on xanax  Controlled Substances Monitoring: Periodic Controlled Substance Monitoring: Possible medication side effects, risk of tolerance/dependence & alternative treatments discussed., No signs of potential drug abuse or diversion identified. (Gary Puente MD)        No follow-ups on file.         Subjective   SUBJECTIVE/OBJECTIVE:  HPI  Pt is a of 53 y.o. male comes in today with   Chief Complaint   Patient presents with    Follow-up    Medication Management   Anxiety stable on xanax.  Sleep stable on klonopin.  Pressure in his chest at rest. Relieved by xanax.  Tender to touch.    Vitals:    24 1116   BP: 114/70   Pulse: 94   SpO2: 96%   Weight: 88.5 kg (195 lb)   Height: 1.753 m (5' 9\")      Review of Systems       Objective   Physical Exam  Constitutional:       Appearance: Normal appearance.   Neurological:      General: No focal deficit present.      Mental Status: He is alert.   Psychiatric:         Mood and Affect: Mood normal.         Behavior: Behavior normal.         Thought Content: Thought content normal.         Judgment: Judgment normal.              An electronic signature was used to authenticate this

## 2024-09-02 ENCOUNTER — PATIENT MESSAGE (OUTPATIENT)
Dept: FAMILY MEDICINE CLINIC | Age: 54
End: 2024-09-02

## 2024-09-02 DIAGNOSIS — E23.0 HYPOGONADOTROPIC HYPOGONADISM (HCC): ICD-10-CM

## 2024-09-02 DIAGNOSIS — E78.49 OTHER HYPERLIPIDEMIA: ICD-10-CM

## 2024-09-02 DIAGNOSIS — E55.9 VITAMIN D DEFICIENCY: ICD-10-CM

## 2024-09-03 DIAGNOSIS — F41.9 ANXIETY: ICD-10-CM

## 2024-09-03 DIAGNOSIS — F51.01 PRIMARY INSOMNIA: ICD-10-CM

## 2024-09-03 RX ORDER — ALPRAZOLAM 2 MG
2 TABLET ORAL 2 TIMES DAILY PRN
Qty: 45 TABLET | Refills: 2 | Status: SHIPPED | OUTPATIENT
Start: 2024-09-03 | End: 2024-12-02

## 2024-09-03 RX ORDER — OMEGA-3-ACID ETHYL ESTERS 1 G/1
2 CAPSULE, LIQUID FILLED ORAL 2 TIMES DAILY
Qty: 120 CAPSULE | Refills: 5 | Status: SHIPPED | OUTPATIENT
Start: 2024-09-03

## 2024-09-03 RX ORDER — CLONAZEPAM 1 MG/1
2 TABLET ORAL NIGHTLY PRN
Qty: 60 TABLET | Refills: 2 | Status: SHIPPED | OUTPATIENT
Start: 2024-09-03 | End: 2024-12-02

## 2024-09-03 RX ORDER — FLUTICASONE PROPIONATE 50 MCG
SPRAY, SUSPENSION (ML) NASAL
Qty: 16 G | Refills: 2 | Status: SHIPPED | OUTPATIENT
Start: 2024-09-03

## 2024-09-03 RX ORDER — ERGOCALCIFEROL 1.25 MG/1
CAPSULE, LIQUID FILLED ORAL
Qty: 4 CAPSULE | Refills: 5 | Status: SHIPPED | OUTPATIENT
Start: 2024-09-03

## 2024-09-03 RX ORDER — TESTOSTERONE 30 MG/1.5ML
SOLUTION TOPICAL
OUTPATIENT
Start: 2024-09-03

## 2024-09-03 RX ORDER — CLONAZEPAM 1 MG/1
TABLET ORAL
Qty: 60 TABLET | Refills: 0 | OUTPATIENT
Start: 2024-09-03 | End: 2024-10-03

## 2024-09-03 RX ORDER — AZELASTINE HYDROCHLORIDE 137 UG/1
SPRAY, METERED NASAL
Qty: 30 ML | Refills: 2 | Status: SHIPPED | OUTPATIENT
Start: 2024-09-03

## 2024-09-03 RX ORDER — ALPRAZOLAM 2 MG/1
TABLET ORAL
Qty: 60 TABLET | Refills: 0 | OUTPATIENT
Start: 2024-09-03 | End: 2024-10-03

## 2024-09-03 NOTE — TELEPHONE ENCOUNTER
Medication:   Requested Prescriptions     Pending Prescriptions Disp Refills    ALPRAZolam (XANAX) 2 MG tablet [Pharmacy Med Name: ALPRAZolam 2 MG TABLET] 22 tablet      Sig: TAKE 1 TABLET BY MOUTH 2 TIMES A DAY AS NEEDED FOR ANXIETY FOR UP TO 15 DAYS    clonazePAM (KLONOPIN) 1 MG tablet [Pharmacy Med Name: clonazePAM 1 MG TABLET] 30 tablet      Sig: TAKE 2 TABLETS BY MOUTH ONCE NIGHTLY AS NEEDED FOR ANXIETY FOR UP TO 15 DAYS       Last Filled:  8/14/24    Patient Phone Number: [phone] (home)     Last appt: 8/14/2024   Next appt: 11/18/2024    Last Labs DM:   Lab Results   Component Value Date/Time    LABA1C 5.4 05/23/2016 09:20 PM     Last Lipid:   Lab Results   Component Value Date/Time    CHOL 181 01/18/2024 02:38 PM    TRIG 142 01/18/2024 02:38 PM    HDL 33 01/18/2024 02:38 PM    HDL 36 05/11/2012 10:08 AM     Last PSA:   Lab Results   Component Value Date/Time    PSA 0.80 01/18/2024 02:38 PM     Last Thyroid:   Lab Results   Component Value Date/Time    TSH 5.04 01/18/2024 02:38 PM    FT3 4.1 01/18/2024 02:38 PM    O8YCTQX 1.38 02/11/2022 08:36 AM    T4FREE 1.3 01/18/2024 02:38 PM

## 2024-09-03 NOTE — TELEPHONE ENCOUNTER
Medication:   Requested Prescriptions     Pending Prescriptions Disp Refills    Azelastine HCl 137 MCG/SPRAY SOLN [Pharmacy Med Name: AZELASTINE 0.1% (137 MCG) SPRY] 30 mL 2     Sig: SPRAY 2 SPRAYS IN EACH NOSTRIL TWICE DAILY    fluticasone (FLONASE) 50 MCG/ACT nasal spray [Pharmacy Med Name: FLUTICASONE PROP 50 MCG SPRAY]       Sig: SPRAY 2 SPRAYS IN EACH NOSTRIL ONCE DAILY       Last Filled:  6/12/24    Patient Phone Number: [phone] (home)     Last appt: 8/14/2024   Next appt: 9/3/2024    Last Labs DM:   Lab Results   Component Value Date/Time    LABA1C 5.4 05/23/2016 09:20 PM     Last Lipid:   Lab Results   Component Value Date/Time    CHOL 181 01/18/2024 02:38 PM    TRIG 142 01/18/2024 02:38 PM    HDL 33 01/18/2024 02:38 PM    HDL 36 05/11/2012 10:08 AM     Last PSA:   Lab Results   Component Value Date/Time    PSA 0.80 01/18/2024 02:38 PM     Last Thyroid:   Lab Results   Component Value Date/Time    TSH 5.04 01/18/2024 02:38 PM    FT3 4.1 01/18/2024 02:38 PM    K4RGTXD 1.38 02/11/2022 08:36 AM    T4FREE 1.3 01/18/2024 02:38 PM

## 2024-09-04 ENCOUNTER — OFFICE VISIT (OUTPATIENT)
Dept: DERMATOLOGY | Age: 54
End: 2024-09-04

## 2024-09-04 DIAGNOSIS — L21.9 SEBORRHEIC DERMATITIS: ICD-10-CM

## 2024-09-04 DIAGNOSIS — L71.9 ROSACEA: Primary | ICD-10-CM

## 2024-09-04 DIAGNOSIS — D22.9 BENIGN NEVUS: ICD-10-CM

## 2024-09-04 DIAGNOSIS — L57.0 AK (ACTINIC KERATOSIS): ICD-10-CM

## 2024-09-04 RX ORDER — KETOCONAZOLE 20 MG/ML
SHAMPOO TOPICAL
Qty: 120 ML | Refills: 11 | Status: SHIPPED | OUTPATIENT
Start: 2024-09-04

## 2024-09-04 NOTE — PROGRESS NOTES
[facility]  Mckenzie Mosher M.D.  [phone]       Juan Francisco Ruiz  1970    54 y.o. male     Date of Visit: 9/4/2024    Chief Complaint:   Chief Complaint   Patient presents with    Skin Lesion        I was asked to see this patient by Dr. Harrison ref. provider found.    History of Present Illness:  1. Follow up    Male pattern alopecia- progressive over years, no family history. May have slowed loss with correction of thyroid.  Bothersome to patient.    Rosacea.  Very happy with MetroCream 0.75%.  Uses this most days, rare inflammatory papules.  Did have 2 inflammatory papules in the last couple of weeks-1 left cheek and 1 left paranasal cheek.  Seem to be improving.  Both did rupture.    Seborrheic dermatitis-uses ketoconazole 2% shampoo-wash his face and scalp although only about once a week.  Does feel that this is helpful.    Dry papule right upper forehead.  Not itching, bleeding.  Asymptomatic.      Review of Systems:  Constitutional: Reports general sense of well-being       Past Medical History, Surgical History, Family History, Medications and Allergies reviewed.    Social History:   Social History     Socioeconomic History    Marital status:      Spouse name: Not on file    Number of children: Not on file    Years of education: Not on file    Highest education level: Not on file   Occupational History    Not on file   Tobacco Use    Smoking status: Never     Passive exposure: Never    Smokeless tobacco: Never   Vaping Use    Vaping status: Never Used   Substance and Sexual Activity    Alcohol use: No    Drug use: No    Sexual activity: Yes     Partners: Female     Comment: ; 3 children   Other Topics Concern    Not on file   Social History Narrative    Not on file     Social Determinants of Health     Financial Resource Strain: Low Risk  (2/14/2024)    Overall Financial Resource Strain (CARDIA)     Difficulty of Paying Living Expenses: Not hard at all   Food Insecurity: No

## 2024-09-26 ENCOUNTER — PATIENT MESSAGE (OUTPATIENT)
Dept: ENDOCRINOLOGY | Age: 54
End: 2024-09-26

## 2024-09-26 DIAGNOSIS — R79.89 ELEVATED INSULIN-LIKE GROWTH FACTOR 1 (IGF-1) LEVEL: ICD-10-CM

## 2024-09-26 DIAGNOSIS — E78.49 OTHER HYPERLIPIDEMIA: ICD-10-CM

## 2024-09-26 DIAGNOSIS — E23.0 HYPOGONADOTROPIC HYPOGONADISM (HCC): ICD-10-CM

## 2024-09-26 DIAGNOSIS — D35.2 PITUITARY MICROADENOMA (HCC): ICD-10-CM

## 2024-09-26 DIAGNOSIS — E55.9 VITAMIN D DEFICIENCY: ICD-10-CM

## 2024-09-26 DIAGNOSIS — E03.9 ACQUIRED HYPOTHYROIDISM: Primary | ICD-10-CM

## 2024-09-26 DIAGNOSIS — Z86.39 HISTORY OF ADRENAL INSUFFICIENCY: ICD-10-CM

## 2024-09-26 DIAGNOSIS — E03.9 ACQUIRED HYPOTHYROIDISM: ICD-10-CM

## 2024-09-26 DIAGNOSIS — Z91.89 AT HIGH RISK FOR CARDIOVASCULAR DISEASE: ICD-10-CM

## 2024-09-26 DIAGNOSIS — R53.83 OTHER FATIGUE: ICD-10-CM

## 2024-09-26 LAB
25(OH)D3 SERPL-MCNC: 24.6 NG/ML
ALBUMIN SERPL-MCNC: 4.4 G/DL (ref 3.4–5)
ALBUMIN/GLOB SERPL: 1.9 {RATIO} (ref 1.1–2.2)
ALP SERPL-CCNC: 72 U/L (ref 40–129)
ALT SERPL-CCNC: 32 U/L (ref 10–40)
ANION GAP SERPL CALCULATED.3IONS-SCNC: 12 MMOL/L (ref 3–16)
AST SERPL-CCNC: 35 U/L (ref 15–37)
BILIRUB SERPL-MCNC: 0.9 MG/DL (ref 0–1)
BUN SERPL-MCNC: 16 MG/DL (ref 7–20)
CALCIUM SERPL-MCNC: 9.7 MG/DL (ref 8.3–10.6)
CHLORIDE SERPL-SCNC: 102 MMOL/L (ref 99–110)
CO2 SERPL-SCNC: 30 MMOL/L (ref 21–32)
CORTIS SERPL-MCNC: 4.7 UG/DL
CREAT SERPL-MCNC: 1 MG/DL (ref 0.9–1.3)
DEPRECATED RDW RBC AUTO: 13.6 % (ref 12.4–15.4)
ESTRADIOL SERPL-MCNC: 51 PG/ML
GFR SERPLBLD CREATININE-BSD FMLA CKD-EPI: 88 ML/MIN/{1.73_M2}
GLUCOSE SERPL-MCNC: 90 MG/DL (ref 70–99)
HCT VFR BLD AUTO: 51.8 % (ref 40.5–52.5)
HGB BLD-MCNC: 17.2 G/DL (ref 13.5–17.5)
IGA SERPL-MCNC: 281 MG/DL (ref 70–400)
MCH RBC QN AUTO: 28.4 PG (ref 26–34)
MCHC RBC AUTO-ENTMCNC: 33.1 G/DL (ref 31–36)
MCV RBC AUTO: 85.7 FL (ref 80–100)
PLATELET # BLD AUTO: 141 K/UL (ref 135–450)
PMV BLD AUTO: 8.3 FL (ref 5–10.5)
POTASSIUM SERPL-SCNC: 4.6 MMOL/L (ref 3.5–5.1)
PROLACTIN SERPL IA-MCNC: 18 NG/ML
PROT SERPL-MCNC: 6.7 G/DL (ref 6.4–8.2)
RBC # BLD AUTO: 6.04 M/UL (ref 4.2–5.9)
SODIUM SERPL-SCNC: 144 MMOL/L (ref 136–145)
T4 FREE SERPL-MCNC: 1.3 NG/DL (ref 0.9–1.8)
TSH SERPL DL<=0.005 MIU/L-ACNC: 6.91 UIU/ML (ref 0.27–4.2)
WBC # BLD AUTO: 4.7 K/UL (ref 4–11)

## 2024-09-27 LAB — TISSUE TRANSGLUTAMINASE IGA: <0.5 U/ML (ref 0–14)

## 2024-09-28 LAB
ACTH PLAS-MCNC: 18 PG/ML (ref 7–63)
GHRH SERPL-MCNC: <0.05 NG/ML (ref 0.05–3)
IGF-I SERPL-MCNC: 180 NG/ML (ref 59–206)
IGF-I Z-SCORE SERPL: 1.3
SHBG SERPL-SCNC: 58 NMOL/L (ref 19–76)
TESTOST FREE MFR SERPL: 1.4 % (ref 1.6–2.9)
TESTOST FREE SERPL-MCNC: 107 PG/ML (ref 47–244)
TESTOST SERPL-MCNC: 746 NG/DL (ref 300–890)
TESTOSTERONE.FREE+WB SERPL-MCNC: 293 NG/DL (ref 131–682)

## 2024-10-01 ENCOUNTER — OFFICE VISIT (OUTPATIENT)
Dept: ENDOCRINOLOGY | Age: 54
End: 2024-10-01
Payer: MEDICAID

## 2024-10-01 VITALS
WEIGHT: 209 LBS | OXYGEN SATURATION: 96 % | HEIGHT: 69 IN | HEART RATE: 90 BPM | BODY MASS INDEX: 30.96 KG/M2 | TEMPERATURE: 98 F | DIASTOLIC BLOOD PRESSURE: 88 MMHG | RESPIRATION RATE: 14 BRPM | SYSTOLIC BLOOD PRESSURE: 141 MMHG

## 2024-10-01 DIAGNOSIS — E03.9 ACQUIRED HYPOTHYROIDISM: ICD-10-CM

## 2024-10-01 DIAGNOSIS — D35.2 PITUITARY MICROADENOMA (HCC): ICD-10-CM

## 2024-10-01 DIAGNOSIS — R53.83 OTHER FATIGUE: ICD-10-CM

## 2024-10-01 DIAGNOSIS — E23.0 HYPOGONADOTROPIC HYPOGONADISM (HCC): Primary | ICD-10-CM

## 2024-10-01 DIAGNOSIS — E55.9 VITAMIN D DEFICIENCY: ICD-10-CM

## 2024-10-01 DIAGNOSIS — E23.0 HYPOGONADOTROPIC HYPOGONADISM (HCC): ICD-10-CM

## 2024-10-01 DIAGNOSIS — L65.9 ALOPECIA: ICD-10-CM

## 2024-10-01 DIAGNOSIS — Z86.39 HISTORY OF ADRENAL INSUFFICIENCY: ICD-10-CM

## 2024-10-01 DIAGNOSIS — R79.89 ELEVATED INSULIN-LIKE GROWTH FACTOR 1 (IGF-1) LEVEL: ICD-10-CM

## 2024-10-01 DIAGNOSIS — Z91.89 AT HIGH RISK FOR CARDIOVASCULAR DISEASE: ICD-10-CM

## 2024-10-01 DIAGNOSIS — E66.811 CLASS 1 OBESITY WITH SERIOUS COMORBIDITY AND BODY MASS INDEX (BMI) OF 30.0 TO 30.9 IN ADULT, UNSPECIFIED OBESITY TYPE: ICD-10-CM

## 2024-10-01 DIAGNOSIS — E78.49 OTHER HYPERLIPIDEMIA: ICD-10-CM

## 2024-10-01 DIAGNOSIS — N52.9 ERECTILE DYSFUNCTION, UNSPECIFIED ERECTILE DYSFUNCTION TYPE: ICD-10-CM

## 2024-10-01 PROCEDURE — 3077F SYST BP >= 140 MM HG: CPT | Performed by: INTERNAL MEDICINE

## 2024-10-01 PROCEDURE — 3079F DIAST BP 80-89 MM HG: CPT | Performed by: INTERNAL MEDICINE

## 2024-10-01 PROCEDURE — G8427 DOCREV CUR MEDS BY ELIG CLIN: HCPCS | Performed by: INTERNAL MEDICINE

## 2024-10-01 PROCEDURE — 1036F TOBACCO NON-USER: CPT | Performed by: INTERNAL MEDICINE

## 2024-10-01 PROCEDURE — 99214 OFFICE O/P EST MOD 30 MIN: CPT | Performed by: INTERNAL MEDICINE

## 2024-10-01 PROCEDURE — G8417 CALC BMI ABV UP PARAM F/U: HCPCS | Performed by: INTERNAL MEDICINE

## 2024-10-01 PROCEDURE — 3017F COLORECTAL CA SCREEN DOC REV: CPT | Performed by: INTERNAL MEDICINE

## 2024-10-01 PROCEDURE — G8484 FLU IMMUNIZE NO ADMIN: HCPCS | Performed by: INTERNAL MEDICINE

## 2024-10-01 RX ORDER — ERGOCALCIFEROL 1.25 MG/1
CAPSULE, LIQUID FILLED ORAL
Qty: 10 CAPSULE | Refills: 3 | Status: SHIPPED | OUTPATIENT
Start: 2024-10-01

## 2024-10-01 RX ORDER — LEVOTHYROXINE SODIUM 50 UG/1
50 TABLET ORAL DAILY
Qty: 30 TABLET | Refills: 3 | Status: SHIPPED | OUTPATIENT
Start: 2024-10-01

## 2024-10-01 RX ORDER — TESTOSTERONE 30 MG/1.5ML
SOLUTION TOPICAL
Qty: 2 EACH | Refills: 3 | Status: SHIPPED | OUTPATIENT
Start: 2024-10-01 | End: 2024-10-28

## 2024-10-01 RX ORDER — SILDENAFIL 100 MG/1
100 TABLET, FILM COATED ORAL DAILY
Qty: 30 TABLET | Refills: 1 | Status: SHIPPED | OUTPATIENT
Start: 2024-10-01

## 2024-10-01 NOTE — PROGRESS NOTES
SUBJECTIVE:  Juan Francisco Ruiz is a 54 y.o. male who is being evaluated for hypogonadism.     1. Hypogonadotropic hypogonadism (HCC)  This started in 2006. Patient was diagnosed with hypogonadism. The problem has been unchanged.     Patient started medication in 2006. Currently patient is on: Axiron. Misses  0 doses a month.  In 2006 started hot flashes.  Had Androgel, testosterone injections before.  Had fluctuations on injections  On Axiron 4 applications daily  Axiron worked better than cypionate, but not enough.  Has ED, decreased libido.    Complains of burping, weight gain  Manages stress better.    Was on GH replacement therapy.   Not on it now.    2. 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  Later he took medication.  Has hand swelling  Shoe size increased 0.5 size  Ring size increased  Had carpal tunnel surgery  Had dental changes, gaps    3. History of adrenal insufficiency   On dexamethasone small dose, then weaned off.  In 2012 stopped medication  Was in [facility] for consultation  Not on medication since then.  Later on dexamethasone 0.5 mg daily. Run out.  He is not on dexamethasone   No problems when of dexamethasone.  No hypotension, nausea, vomiting    Current complaints: fatigue, insomnia, muscle weakness, loss of eyebrows.  Fatigue was severe.  On Axiron 4 applications daily    4. Pituitary microadenoma (HCC)  In 2012 Dx with pituitary microadenoma  In 2010 had severe headaches, migraines, Dx with cluster headaches.    Has Hx of pancreatitis due to gallstones.  Has ongoing pancreas issues since then  Sees Dr. Morrison.    History of obstructive symptoms: difficulty swallowing No, changes in voice/hoarseness No.  History of radiation to patient's neck: No  Resent iodine exposure: No  Family history includes no thyroid abnormalities.  Family history of

## 2024-10-02 RX ORDER — SILDENAFIL 100 MG/1
100 TABLET, FILM COATED ORAL DAILY
Qty: 30 TABLET | Refills: 1 | OUTPATIENT
Start: 2024-10-02

## 2024-10-08 ENCOUNTER — HOSPITAL ENCOUNTER (OUTPATIENT)
Dept: ONCOLOGY | Age: 54
Setting detail: INFUSION SERIES
Discharge: HOME | End: 2024-10-08
Payer: MEDICAID

## 2024-10-08 ENCOUNTER — TELEPHONE (OUTPATIENT)
Dept: ENDOCRINOLOGY | Age: 54
End: 2024-10-08

## 2024-10-08 VITALS
TEMPERATURE: 97.6 F | OXYGEN SATURATION: 96 % | DIASTOLIC BLOOD PRESSURE: 86 MMHG | HEART RATE: 79 BPM | SYSTOLIC BLOOD PRESSURE: 154 MMHG | RESPIRATION RATE: 14 BRPM

## 2024-10-08 DIAGNOSIS — E23.7 PITUITARY ABNORMALITY (HCC): ICD-10-CM

## 2024-10-08 DIAGNOSIS — D35.2 PITUITARY MICROADENOMA (HCC): Primary | ICD-10-CM

## 2024-10-08 DIAGNOSIS — Z86.39 HISTORY OF ADRENAL INSUFFICIENCY: ICD-10-CM

## 2024-10-08 LAB
CORTIS 1H P 250 UG ACTH RIV SERPL-MCNC: 17.8 UG/DL
CORTIS 30M P 250 UG ACTH RIV SERPL-MCNC: 13.6 UG/DL
CORTIS SERPL-MCNC: 6.8 UG/DL

## 2024-10-08 PROCEDURE — 6360000002 HC RX W HCPCS: Performed by: INTERNAL MEDICINE

## 2024-10-08 PROCEDURE — 80400 ACTH STIMULATION PANEL: CPT

## 2024-10-08 PROCEDURE — 96374 THER/PROPH/DIAG INJ IV PUSH: CPT

## 2024-10-08 RX ORDER — SODIUM CHLORIDE 9 MG/ML
5-250 INJECTION, SOLUTION INTRAVENOUS PRN
OUTPATIENT
Start: 2024-12-24

## 2024-10-08 RX ORDER — DIPHENHYDRAMINE HYDROCHLORIDE 50 MG/ML
50 INJECTION INTRAMUSCULAR; INTRAVENOUS
OUTPATIENT
Start: 2024-12-24

## 2024-10-08 RX ORDER — COSYNTROPIN 0.25 MG/ML
250 INJECTION, POWDER, FOR SOLUTION INTRAMUSCULAR; INTRAVENOUS ONCE
Status: CANCELLED | OUTPATIENT
Start: 2024-12-24 | End: 2024-12-24

## 2024-10-08 RX ORDER — HEPARIN 100 UNIT/ML
500 SYRINGE INTRAVENOUS PRN
OUTPATIENT
Start: 2024-12-24

## 2024-10-08 RX ORDER — ONDANSETRON 2 MG/ML
8 INJECTION INTRAMUSCULAR; INTRAVENOUS
OUTPATIENT
Start: 2024-12-24

## 2024-10-08 RX ORDER — SODIUM CHLORIDE 9 MG/ML
INJECTION, SOLUTION INTRAVENOUS CONTINUOUS
OUTPATIENT
Start: 2024-12-24

## 2024-10-08 RX ORDER — COSYNTROPIN 0.25 MG/ML
250 INJECTION, POWDER, FOR SOLUTION INTRAMUSCULAR; INTRAVENOUS ONCE
Status: COMPLETED | OUTPATIENT
Start: 2024-10-08 | End: 2024-10-08

## 2024-10-08 RX ORDER — ACETAMINOPHEN 325 MG/1
650 TABLET ORAL
OUTPATIENT
Start: 2024-12-24

## 2024-10-08 RX ORDER — SODIUM CHLORIDE 0.9 % (FLUSH) 0.9 %
5-40 SYRINGE (ML) INJECTION PRN
OUTPATIENT
Start: 2024-12-24

## 2024-10-08 RX ORDER — ALBUTEROL SULFATE 90 UG/1
4 INHALANT RESPIRATORY (INHALATION) PRN
OUTPATIENT
Start: 2024-12-24

## 2024-10-08 RX ORDER — EPINEPHRINE 1 MG/ML
0.3 INJECTION, SOLUTION INTRAMUSCULAR; SUBCUTANEOUS PRN
OUTPATIENT
Start: 2024-12-24

## 2024-10-08 RX ADMIN — COSYNTROPIN 250 MCG: 0.25 INJECTION, POWDER, LYOPHILIZED, FOR SOLUTION INTRAMUSCULAR; INTRAVENOUS at 08:30

## 2024-10-08 NOTE — TELEPHONE ENCOUNTER
Medical calling saying she needs clinical to get the MRI authorization. Can you please reach out to, Bridgett, and help her get what she needs. She stated that she got the order but not the reason he is needing it.  Bridgett Ph: [phone], Fax:  [phone]

## 2024-10-08 NOTE — PROGRESS NOTES
Pt seen and assessed [facility] today for ACTH stimulation panel per orders from Dr. Lee. Labs drawn per protocol prior to cosyntropin administration and 30 min/60 min post. Pt tolerated well and without incident.  Pt verbalizes understanding of discharge instructions.  Discharged ambulatory to home.

## 2024-10-08 NOTE — PROGRESS NOTES
described in this documentation, as scribed by the above signed scribe in my presence. It is both accurate and complete to my knowledge. I agree with the details independently gathered by the clinical support staff, while the remaining scribed note accurately describes my personal service to the patient.      Vern Garcia MD  [address] [place], [place] [address]  [phone] [facility]  [phone] [facility]

## 2024-10-08 NOTE — TELEPHONE ENCOUNTER
Called  Bridgett back at 1412. Informed her that was returning call we received. Informed her that our staff had written that she was requesting clinical to obtain the MRI authorization. She stated yes, they were needing clinical to obtain this. Informed her that we do not handle prior authorizations for imaging and that the pt would need to go to [facility] if they were unable to do the authorization. Bridgett then proceeded to state I'm not asking for the authorization, I'm asking for the clinical. I asked her do you mean chart notes? I can send that. She stated yes, that's what I asked for. I informed her that it seemed as if she had been requesting clinical authorization & she responded with \"that's not what I said. I asked for clinical. I work for [facility] and that's where the pt's scheduled. I need the clinical so WE can submit the prior authorization for the pt. If you can't send me the clinical, you need to cancel the patient and tell him go to [facility]. So can you send them to me or not?\". Told her sure.  Eileen HARRINGTON.

## 2024-10-09 ENCOUNTER — HOSPITAL ENCOUNTER (OUTPATIENT)
Dept: CT IMAGING | Age: 54
Discharge: HOME | End: 2024-10-09
Payer: MEDICAID

## 2024-10-09 ENCOUNTER — PATIENT MESSAGE (OUTPATIENT)
Dept: ENDOCRINOLOGY | Age: 54
End: 2024-10-09

## 2024-10-09 ENCOUNTER — HOSPITAL ENCOUNTER (OUTPATIENT)
Age: 54
Discharge: HOME | End: 2024-10-09
Payer: MEDICAID

## 2024-10-09 ENCOUNTER — HOSPITAL ENCOUNTER (OUTPATIENT)
Dept: GENERAL RADIOLOGY | Age: 54
Discharge: HOME | End: 2024-10-09
Payer: MEDICAID

## 2024-10-09 DIAGNOSIS — E78.49 OTHER HYPERLIPIDEMIA: ICD-10-CM

## 2024-10-09 DIAGNOSIS — M25.562 ARTHRALGIA OF LEFT LOWER LEG: ICD-10-CM

## 2024-10-09 DIAGNOSIS — Z91.89 AT HIGH RISK FOR CARDIOVASCULAR DISEASE: ICD-10-CM

## 2024-10-09 DIAGNOSIS — E78.2 MIXED HYPERLIPIDEMIA: Primary | ICD-10-CM

## 2024-10-09 PROCEDURE — 75571 CT HRT W/O DYE W/CA TEST: CPT

## 2024-10-09 PROCEDURE — 73560 X-RAY EXAM OF KNEE 1 OR 2: CPT

## 2024-10-09 PROCEDURE — 73562 X-RAY EXAM OF KNEE 3: CPT

## 2024-10-09 NOTE — TELEPHONE ENCOUNTER
Will see Cardiology. Sees cardiologist yearly for aortic dissection.  Ordered lipid panel and hemoglobin A1c for January test.  Patient also scheduled MRI.    Let me know when CT calcium score results are available.

## 2024-10-16 ENCOUNTER — OFFICE VISIT (OUTPATIENT)
Dept: CARDIOLOGY CLINIC | Age: 54
End: 2024-10-16
Payer: MEDICAID

## 2024-10-16 VITALS
WEIGHT: 213.6 LBS | HEIGHT: 69 IN | BODY MASS INDEX: 31.64 KG/M2 | HEART RATE: 90 BPM | DIASTOLIC BLOOD PRESSURE: 78 MMHG | SYSTOLIC BLOOD PRESSURE: 122 MMHG | OXYGEN SATURATION: 96 %

## 2024-10-16 DIAGNOSIS — R07.2 SUBSTERNAL CHEST PAIN: ICD-10-CM

## 2024-10-16 DIAGNOSIS — Z86.018 HISTORY OF PITUITARY ADENOMA: ICD-10-CM

## 2024-10-16 DIAGNOSIS — I10 BENIGN HYPERTENSION: Primary | ICD-10-CM

## 2024-10-16 DIAGNOSIS — R00.1 SINUS BRADYCARDIA: ICD-10-CM

## 2024-10-16 DIAGNOSIS — I71.00 DISSECTION OF AORTA, UNSPECIFIED PORTION OF AORTA (HCC): ICD-10-CM

## 2024-10-16 DIAGNOSIS — E78.2 MIXED HYPERLIPIDEMIA: ICD-10-CM

## 2024-10-16 PROBLEM — R60.0 LOCALIZED EDEMA: Status: ACTIVE | Noted: 2024-10-16

## 2024-10-16 PROBLEM — R07.89 OTHER CHEST PAIN: Status: ACTIVE | Noted: 2024-10-16

## 2024-10-16 PROCEDURE — 3078F DIAST BP <80 MM HG: CPT | Performed by: INTERNAL MEDICINE

## 2024-10-16 PROCEDURE — 93000 ELECTROCARDIOGRAM COMPLETE: CPT | Performed by: INTERNAL MEDICINE

## 2024-10-16 PROCEDURE — G8484 FLU IMMUNIZE NO ADMIN: HCPCS | Performed by: INTERNAL MEDICINE

## 2024-10-16 PROCEDURE — G8417 CALC BMI ABV UP PARAM F/U: HCPCS | Performed by: INTERNAL MEDICINE

## 2024-10-16 PROCEDURE — 99214 OFFICE O/P EST MOD 30 MIN: CPT | Performed by: INTERNAL MEDICINE

## 2024-10-16 PROCEDURE — G8427 DOCREV CUR MEDS BY ELIG CLIN: HCPCS | Performed by: INTERNAL MEDICINE

## 2024-10-16 PROCEDURE — 1036F TOBACCO NON-USER: CPT | Performed by: INTERNAL MEDICINE

## 2024-10-16 PROCEDURE — 3074F SYST BP LT 130 MM HG: CPT | Performed by: INTERNAL MEDICINE

## 2024-10-16 PROCEDURE — 3017F COLORECTAL CA SCREEN DOC REV: CPT | Performed by: INTERNAL MEDICINE

## 2024-10-16 NOTE — PATIENT INSTRUCTIONS
Plan:  Labs reviewed in epic and discussed with patient.  Current medications reviewed.  Refills given as warranted.  I am trying to get the results of your CT calcium score.  After I have the results, then the decision will be made about adding cholesterol medication.   Repeat /78  Call [phone] to schedule a CT of your abdomen without contrast to look at your aorta.    Follow up with me in 6 months.

## 2024-10-19 ENCOUNTER — TELEPHONE (OUTPATIENT)
Dept: ENDOCRINOLOGY | Age: 54
End: 2024-10-19

## 2024-10-19 DIAGNOSIS — E27.40 ADRENAL INSUFFICIENCY (HCC): Primary | ICD-10-CM

## 2024-10-22 ENCOUNTER — TELEPHONE (OUTPATIENT)
Dept: CARDIOLOGY CLINIC | Age: 54
End: 2024-10-22

## 2024-10-22 NOTE — TELEPHONE ENCOUNTER
RKG reviewed CT calcium score and testing does not show any blockages.    LM for patient to call office for results

## 2024-10-31 ENCOUNTER — OFFICE VISIT (OUTPATIENT)
Dept: DERMATOLOGY | Age: 54
End: 2024-10-31

## 2024-10-31 ENCOUNTER — TELEPHONE (OUTPATIENT)
Dept: ENDOCRINOLOGY | Age: 54
End: 2024-10-31

## 2024-10-31 DIAGNOSIS — L57.0 AK (ACTINIC KERATOSIS): Primary | ICD-10-CM

## 2024-10-31 NOTE — PROGRESS NOTES
[facility]  Mckenzie Mosher M.D.  [phone]       Juan Francisco Ruiz  1970    54 y.o. male     Date of Visit: 10/31/2024    Chief Complaint:   Chief Complaint   Patient presents with    Skin Lesion        I was asked to see this patient by Dr. Harrison ref. provider found.    History of Present Illness:  1.  Patient presents today for follow-up after treatment of an actinic keratosis right upper forehead.  Was treated with liquid nitrogen 9/4/2024, crusted, desquamated, small focus of residual papule has persisted    Skin history:    Rosacea-MetroCream 0.75% twice daily  Seborrheic dermatitis-ketoconazole 2% shampoo    Male pattern alopecia-discussed minoxidil 9/24      Review of Systems:  Constitutional: Reports general sense of well-being       Past Medical History, Surgical History, Family History, Medications and Allergies reviewed.    Social History:   Social History     Socioeconomic History    Marital status:      Spouse name: Not on file    Number of children: Not on file    Years of education: Not on file    Highest education level: Not on file   Occupational History    Not on file   Tobacco Use    Smoking status: Never     Passive exposure: Never    Smokeless tobacco: Never   Vaping Use    Vaping status: Never Used   Substance and Sexual Activity    Alcohol use: No    Drug use: No    Sexual activity: Yes     Partners: Female     Comment: ; 3 children   Other Topics Concern    Not on file   Social History Narrative    Not on file     Social Determinants of Health     Financial Resource Strain: Low Risk  (2/14/2024)    Overall Financial Resource Strain (CARDIA)     Difficulty of Paying Living Expenses: Not hard at all   Food Insecurity: No Food Insecurity (2/14/2024)    Hunger Vital Sign     Worried About Running Out of Food in the Last Year: Never true     Ran Out of Food in the Last Year: Never true   Transportation Needs: Unknown (2/14/2024)    PRAPARE - Transportation     Lack

## 2024-10-31 NOTE — TELEPHONE ENCOUNTER
Report is being addended. Will wait for updated report.   Pt BIBA from [address] [place] [address], c/o abdominal pain, states he also had not been compliant with metformin prescription

## 2024-11-01 NOTE — TELEPHONE ENCOUNTER
Left patient a message that radiology added addendum correcting report to microadenoma.  Advised patient that microadenoma is very small and neurosurgery consult is not indicated at this time.  I advised that hormonal testing needs to be continued, and pituitary related hormones were ordered already for January appointment.  Allergies are unlikely related to pituitary problems.    Please ask patient if he has more questions.

## 2024-11-12 ENCOUNTER — TELEMEDICINE (OUTPATIENT)
Dept: ENDOCRINOLOGY | Age: 54
End: 2024-11-12
Payer: MEDICAID

## 2024-11-12 DIAGNOSIS — Z91.89 AT HIGH RISK FOR CARDIOVASCULAR DISEASE: ICD-10-CM

## 2024-11-12 DIAGNOSIS — E66.811 CLASS 1 OBESITY WITH SERIOUS COMORBIDITY AND BODY MASS INDEX (BMI) OF 31.0 TO 31.9 IN ADULT, UNSPECIFIED OBESITY TYPE: ICD-10-CM

## 2024-11-12 DIAGNOSIS — E78.2 MIXED HYPERLIPIDEMIA: ICD-10-CM

## 2024-11-12 DIAGNOSIS — E03.9 ACQUIRED HYPOTHYROIDISM: ICD-10-CM

## 2024-11-12 DIAGNOSIS — E23.0 HYPOGONADOTROPIC HYPOGONADISM (HCC): Primary | ICD-10-CM

## 2024-11-12 DIAGNOSIS — D35.2 PITUITARY MICROADENOMA (HCC): ICD-10-CM

## 2024-11-12 DIAGNOSIS — N52.9 ERECTILE DYSFUNCTION, UNSPECIFIED ERECTILE DYSFUNCTION TYPE: ICD-10-CM

## 2024-11-12 DIAGNOSIS — R79.89 ELEVATED INSULIN-LIKE GROWTH FACTOR 1 (IGF-1) LEVEL: ICD-10-CM

## 2024-11-12 DIAGNOSIS — R53.83 OTHER FATIGUE: ICD-10-CM

## 2024-11-12 DIAGNOSIS — Z86.39 HISTORY OF ADRENAL INSUFFICIENCY: ICD-10-CM

## 2024-11-12 DIAGNOSIS — E55.9 VITAMIN D DEFICIENCY: ICD-10-CM

## 2024-11-12 DIAGNOSIS — E78.49 OTHER HYPERLIPIDEMIA: ICD-10-CM

## 2024-11-12 PROCEDURE — 3017F COLORECTAL CA SCREEN DOC REV: CPT | Performed by: INTERNAL MEDICINE

## 2024-11-12 PROCEDURE — 99214 OFFICE O/P EST MOD 30 MIN: CPT | Performed by: INTERNAL MEDICINE

## 2024-11-12 PROCEDURE — G8427 DOCREV CUR MEDS BY ELIG CLIN: HCPCS | Performed by: INTERNAL MEDICINE

## 2024-11-12 RX ORDER — DEXAMETHASONE 1 MG
TABLET ORAL
Qty: 1 TABLET | Refills: 0 | Status: SHIPPED | OUTPATIENT
Start: 2024-11-12

## 2024-11-12 NOTE — PROGRESS NOTES
region no straits a  small round area of diminished enhancement in the central  pituitary gland which could represent a small adenoma the proper  clinical setting.  The adenoma is seen only on the sagittal image  and measures approximately 3 mm in diameter. In retrospect this  abnormality appears to be present on the previous MRI from 2013  and there has been no appreciable change.    Infundibulum is  midline and appears unremarkable.  Optic chiasm is intact.   Suprasellar cistern is intact.  Cavernous sinuses are  unremarkable.   IMPRESSION:  1. Possible small 3 mm pituitary microadenoma in the central  pituitary gland identified only on the sagittal images.  No  significant pituitary abnormality otherwise.     - MRI BRAIN W WO CONTRAST; Future     6. History of adrenal insufficiency  Cortrosyn stim test results: cortisol 6.8-13.6-17.8  Complains of fatigue.  Referred to [facility] for second opinion, appointment in 12/2024  Patient had a lot of side effects on HC, felt worse, gaining weight.  Cortrosyn stim test results suboptimal, however, recent done with newer assay 60 minutes after Cortrosyn injection cortisol 15. Repeat considering that patient has more fatigue.  Patient does not have other symptoms or signs of adrenal insufficiency other than fatigue.  Gained weight.  He had side effects on steroids before  DHEAS 32.6  Cortisol 5.1-4.7  FSH 0.1  LH <0.1  - ACTH; Future  - Cortisol AM, Total; Future             Component  Ref Range & Units 10/8/24 0820 9/8/22 0845 4/21/17 1115 12/20/16 0000 8/19/14 1044 11/15/13 0915   Cortisol, Base  ug/dL 6.8 2.3 CM 7.6 CM <0.010 CM 8.9 CM 9.6 CM   Comment: Normally, serum cortisol will increase by 7 ug/dl with a peak  of serum cortisol greater than 20 ug/dl. Patients with adrenal  cortical atrophy due to prolonged ACTH deficiency, such as  after glucortical treatment or dysfunction of the pituitary  or hypothalamus, may have subnormal to near-normal increases  of serum cortisol.

## 2024-11-15 ENCOUNTER — HOSPITAL ENCOUNTER (OUTPATIENT)
Age: 54
Discharge: HOME | End: 2024-11-15
Payer: MEDICAID

## 2024-11-15 DIAGNOSIS — E03.9 ACQUIRED HYPOTHYROIDISM: ICD-10-CM

## 2024-11-15 DIAGNOSIS — D35.2 PITUITARY MICROADENOMA (HCC): ICD-10-CM

## 2024-11-15 LAB
CORTIS AM PEAK SERPL-MCNC: <0.8 UG/DL (ref 4.3–22.4)
T3 SERPL-MCNC: 1.13 NG/ML (ref 0.8–2)
T3FREE SERPL-MCNC: 3.3 PG/ML (ref 2.3–4.2)
T4 FREE SERPL-MCNC: 1.2 NG/DL (ref 0.9–1.8)
TSH SERPL DL<=0.005 MIU/L-ACNC: 0.54 UIU/ML (ref 0.27–4.2)

## 2024-11-15 PROCEDURE — 84481 FREE ASSAY (FT-3): CPT

## 2024-11-15 PROCEDURE — 36415 COLL VENOUS BLD VENIPUNCTURE: CPT

## 2024-11-15 PROCEDURE — 82533 TOTAL CORTISOL: CPT

## 2024-11-15 PROCEDURE — 84443 ASSAY THYROID STIM HORMONE: CPT

## 2024-11-15 PROCEDURE — 84439 ASSAY OF FREE THYROXINE: CPT

## 2024-11-15 PROCEDURE — 80299 QUANTITATIVE ASSAY DRUG: CPT

## 2024-11-16 ENCOUNTER — PATIENT MESSAGE (OUTPATIENT)
Dept: ENDOCRINOLOGY | Age: 54
End: 2024-11-16

## 2024-11-17 ENCOUNTER — TELEPHONE (OUTPATIENT)
Dept: ENDOCRINOLOGY | Age: 54
End: 2024-11-17

## 2024-11-18 ENCOUNTER — OFFICE VISIT (OUTPATIENT)
Dept: FAMILY MEDICINE CLINIC | Age: 54
End: 2024-11-18
Payer: MEDICAID

## 2024-11-18 VITALS
BODY MASS INDEX: 31.7 KG/M2 | HEART RATE: 79 BPM | DIASTOLIC BLOOD PRESSURE: 95 MMHG | WEIGHT: 214 LBS | OXYGEN SATURATION: 96 % | HEIGHT: 69 IN | SYSTOLIC BLOOD PRESSURE: 155 MMHG

## 2024-11-18 DIAGNOSIS — D33.3: Primary | ICD-10-CM

## 2024-11-18 DIAGNOSIS — F51.01 PRIMARY INSOMNIA: ICD-10-CM

## 2024-11-18 DIAGNOSIS — F41.9 ANXIETY: ICD-10-CM

## 2024-11-18 DIAGNOSIS — I10 BENIGN HYPERTENSION: ICD-10-CM

## 2024-11-18 LAB — DEXAMETHASONE SERPL-MCNC: 403.1 NG/DL

## 2024-11-18 PROCEDURE — 3017F COLORECTAL CA SCREEN DOC REV: CPT | Performed by: FAMILY MEDICINE

## 2024-11-18 PROCEDURE — 99214 OFFICE O/P EST MOD 30 MIN: CPT | Performed by: FAMILY MEDICINE

## 2024-11-18 PROCEDURE — 3077F SYST BP >= 140 MM HG: CPT | Performed by: FAMILY MEDICINE

## 2024-11-18 PROCEDURE — 1036F TOBACCO NON-USER: CPT | Performed by: FAMILY MEDICINE

## 2024-11-18 PROCEDURE — 3080F DIAST BP >= 90 MM HG: CPT | Performed by: FAMILY MEDICINE

## 2024-11-18 PROCEDURE — G8484 FLU IMMUNIZE NO ADMIN: HCPCS | Performed by: FAMILY MEDICINE

## 2024-11-18 PROCEDURE — G8417 CALC BMI ABV UP PARAM F/U: HCPCS | Performed by: FAMILY MEDICINE

## 2024-11-18 PROCEDURE — G8427 DOCREV CUR MEDS BY ELIG CLIN: HCPCS | Performed by: FAMILY MEDICINE

## 2024-11-18 RX ORDER — HYDROCHLOROTHIAZIDE 25 MG/1
25 TABLET ORAL EVERY MORNING
Qty: 30 TABLET | Refills: 2 | Status: SHIPPED | OUTPATIENT
Start: 2024-11-18

## 2024-11-18 RX ORDER — ALPRAZOLAM 2 MG/1
2 TABLET ORAL 2 TIMES DAILY PRN
Qty: 45 TABLET | Refills: 2 | Status: SHIPPED | OUTPATIENT
Start: 2024-11-18 | End: 2024-11-18

## 2024-11-18 RX ORDER — CLONAZEPAM 1 MG/1
2 TABLET ORAL NIGHTLY PRN
Qty: 60 TABLET | Refills: 2 | Status: SHIPPED | OUTPATIENT
Start: 2024-11-28 | End: 2025-02-26

## 2024-11-18 RX ORDER — ALPRAZOLAM 2 MG/1
2 TABLET ORAL 2 TIMES DAILY PRN
Qty: 45 TABLET | Refills: 2 | Status: SHIPPED | OUTPATIENT
Start: 2024-11-28 | End: 2025-02-26

## 2024-11-18 RX ORDER — CLONAZEPAM 1 MG/1
2 TABLET ORAL NIGHTLY PRN
Qty: 60 TABLET | Refills: 2 | Status: SHIPPED | OUTPATIENT
Start: 2024-11-18 | End: 2024-11-18

## 2024-11-18 NOTE — PROGRESS NOTES
Juan Francisco Ruiz (:  1970) is a 54 y.o. male,Established patient, here for evaluation of the following chief complaint(s):  Medication Check, Anxiety, MRI results, and go over Thyoid labs      Assessment & Plan   ASSESSMENT/PLAN:  Juan Francisco \"Ian" was seen today for medication check, anxiety, mri results and go over thyoid labs.    Diagnoses and all orders for this visit:    Benign neoplasm of trigeminal nerve (HCC)  -     [facility] - Quirino Riggs MD, Neurosurgery (Spine, Brain Tumor), [facility]  Reviewed for further evaluation  Primary insomnia  -     Discontinue: clonazePAM (KLONOPIN) 1 MG tablet; Take 2 tablets by mouth nightly as needed (sleep) for up to 90 days.  -     clonazePAM (KLONOPIN) 1 MG tablet; Take 2 tablets by mouth nightly as needed (sleep) for up to 90 days.  Stable on klonopin  Controlled Substances Monitoring: Periodic Controlled Substance Monitoring: Possible medication side effects, risk of tolerance/dependence & alternative treatments discussed., No signs of potential drug abuse or diversion identified. (Gary Puente MD)   Anxiety  -     Discontinue: ALPRAZolam (XANAX) 2 MG tablet; Take 1 tablet by mouth 2 times daily as needed for Anxiety for up to 90 days.  -     ALPRAZolam (XANAX) 2 MG tablet; Take 1 tablet by mouth 2 times daily as needed for Anxiety for up to 90 days.  Stable on xanax  Hypertension  Not well controlled and retaining water. Adding hctz  -     hydroCHLOROthiazide (HYDRODIURIL) 25 MG tablet; Take 1 tablet by mouth every morning         No follow-ups on file.         Subjective   SUBJECTIVE/OBJECTIVE:  HPI  Pt is a of 54 y.o. male comes in today with   Chief Complaint   Patient presents with    Medication Check    Anxiety    MRI results    go over Thyoid labs     Right trigeminal nerve pain.  Worse if more sinus pain/pressure.    Has noted wt gain. Started after starting thyroid med.  Diet has been good.  More active.  Feels like he's holding water.  Feels

## 2024-11-26 DIAGNOSIS — E78.49 OTHER HYPERLIPIDEMIA: ICD-10-CM

## 2024-11-26 DIAGNOSIS — D35.2 PITUITARY MICROADENOMA (HCC): ICD-10-CM

## 2024-11-26 DIAGNOSIS — R79.89 ELEVATED INSULIN-LIKE GROWTH FACTOR 1 (IGF-1) LEVEL: ICD-10-CM

## 2024-11-26 DIAGNOSIS — Z86.39 HISTORY OF ADRENAL INSUFFICIENCY: ICD-10-CM

## 2024-11-26 DIAGNOSIS — E55.9 VITAMIN D DEFICIENCY: ICD-10-CM

## 2024-11-26 DIAGNOSIS — E23.0 HYPOGONADOTROPIC HYPOGONADISM (HCC): ICD-10-CM

## 2024-11-26 DIAGNOSIS — E03.9 ACQUIRED HYPOTHYROIDISM: ICD-10-CM

## 2024-11-26 RX ORDER — SILDENAFIL 100 MG/1
100 TABLET, FILM COATED ORAL DAILY
Qty: 30 TABLET | Refills: 1 | Status: SHIPPED | OUTPATIENT
Start: 2024-11-26

## 2024-11-26 NOTE — TELEPHONE ENCOUNTER
Medication:   Requested Prescriptions     Pending Prescriptions Disp Refills    Azelastine HCl 137 MCG/SPRAY SOLN [Pharmacy Med Name: AZELASTINE 0.1% (137 MCG) SPRY] 30 mL 2     Sig: SPRAY 2 SPRAYS IN EACH NOSTRIL TWICE DAILY    fluticasone (FLONASE) 50 MCG/ACT nasal spray [Pharmacy Med Name: FLUTICASONE PROP 50 MCG SPRAY]       Sig: SPRAY 2 SPRAYS IN EACH NOSTRIL ONCE DAILY       Last Filled:  9/3/24    Patient Phone Number: [phone] (home)     Last appt: 11/18/2024   Next appt: 2/18/2025    Last Labs DM:   Lab Results   Component Value Date/Time    LABA1C 5.4 05/23/2016 09:20 PM     Last Lipid:   Lab Results   Component Value Date/Time    CHOL 181 01/18/2024 02:38 PM    TRIG 142 01/18/2024 02:38 PM    HDL 33 01/18/2024 02:38 PM    HDL 36 05/11/2012 10:08 AM     Last PSA:   Lab Results   Component Value Date/Time    PSA 0.80 01/18/2024 02:38 PM     Last Thyroid:   Lab Results   Component Value Date/Time    TSH 0.54 11/15/2024 08:49 AM    FT3 3.3 11/15/2024 08:49 AM    S2CEMDS 1.13 11/15/2024 08:49 AM    T4FREE 1.2 11/15/2024 08:49 AM

## 2024-11-27 RX ORDER — FLUTICASONE PROPIONATE 50 MCG
SPRAY, SUSPENSION (ML) NASAL
Qty: 16 G | Refills: 2 | Status: SHIPPED | OUTPATIENT
Start: 2024-11-27

## 2024-11-27 RX ORDER — AZELASTINE HYDROCHLORIDE 137 UG/1
SPRAY, METERED NASAL
Qty: 30 ML | Refills: 2 | Status: SHIPPED | OUTPATIENT
Start: 2024-11-27

## 2024-12-02 DIAGNOSIS — D35.2 PITUITARY MICROADENOMA (HCC): ICD-10-CM

## 2025-01-24 DIAGNOSIS — Z86.39 HISTORY OF ADRENAL INSUFFICIENCY: ICD-10-CM

## 2025-01-24 DIAGNOSIS — R79.89 ELEVATED INSULIN-LIKE GROWTH FACTOR 1 (IGF-1) LEVEL: ICD-10-CM

## 2025-01-24 DIAGNOSIS — D35.2 PITUITARY MICROADENOMA (HCC): ICD-10-CM

## 2025-01-24 DIAGNOSIS — E55.9 VITAMIN D DEFICIENCY: ICD-10-CM

## 2025-01-24 DIAGNOSIS — E23.0 HYPOGONADOTROPIC HYPOGONADISM (HCC): ICD-10-CM

## 2025-01-24 DIAGNOSIS — E03.9 ACQUIRED HYPOTHYROIDISM: ICD-10-CM

## 2025-01-24 DIAGNOSIS — E23.0 HYPOGONADOTROPIC HYPOGONADISM: ICD-10-CM

## 2025-01-24 DIAGNOSIS — E78.49 OTHER HYPERLIPIDEMIA: ICD-10-CM

## 2025-01-27 RX ORDER — SILDENAFIL 100 MG/1
100 TABLET, FILM COATED ORAL DAILY
Qty: 30 TABLET | Refills: 1 | Status: SHIPPED | OUTPATIENT
Start: 2025-01-27

## 2025-01-27 RX ORDER — LEVOTHYROXINE SODIUM 50 UG/1
50 TABLET ORAL DAILY
Qty: 30 TABLET | Refills: 3 | OUTPATIENT
Start: 2025-01-27

## 2025-01-27 RX ORDER — LEVOTHYROXINE SODIUM 50 UG/1
50 TABLET ORAL DAILY
Qty: 30 TABLET | Refills: 1 | Status: SHIPPED | OUTPATIENT
Start: 2025-01-27

## 2025-01-27 RX ORDER — SILDENAFIL 100 MG/1
100 TABLET, FILM COATED ORAL DAILY
Qty: 30 TABLET | Refills: 1 | OUTPATIENT
Start: 2025-01-27

## 2025-02-18 ENCOUNTER — OFFICE VISIT (OUTPATIENT)
Dept: FAMILY MEDICINE CLINIC | Age: 55
End: 2025-02-18
Payer: MEDICAID

## 2025-02-18 VITALS
BODY MASS INDEX: 33.62 KG/M2 | WEIGHT: 227 LBS | HEIGHT: 69 IN | SYSTOLIC BLOOD PRESSURE: 146 MMHG | DIASTOLIC BLOOD PRESSURE: 86 MMHG | OXYGEN SATURATION: 95 % | HEART RATE: 88 BPM

## 2025-02-18 DIAGNOSIS — E23.0 HYPOGONADOTROPIC HYPOGONADISM: ICD-10-CM

## 2025-02-18 DIAGNOSIS — R79.89 ELEVATED INSULIN-LIKE GROWTH FACTOR 1 (IGF-1) LEVEL: ICD-10-CM

## 2025-02-18 DIAGNOSIS — D33.3: ICD-10-CM

## 2025-02-18 DIAGNOSIS — F41.9 ANXIETY: Primary | ICD-10-CM

## 2025-02-18 DIAGNOSIS — I71.00 DISSECTION OF AORTA, UNSPECIFIED PORTION OF AORTA (HCC): ICD-10-CM

## 2025-02-18 DIAGNOSIS — E03.9 ACQUIRED HYPOTHYROIDISM: ICD-10-CM

## 2025-02-18 DIAGNOSIS — Z86.39 HISTORY OF ADRENAL INSUFFICIENCY: ICD-10-CM

## 2025-02-18 DIAGNOSIS — D35.2 PITUITARY MICROADENOMA (HCC): ICD-10-CM

## 2025-02-18 DIAGNOSIS — E55.9 VITAMIN D DEFICIENCY: ICD-10-CM

## 2025-02-18 DIAGNOSIS — K86.1 CHRONIC PANCREATITIS, UNSPECIFIED PANCREATITIS TYPE (HCC): ICD-10-CM

## 2025-02-18 DIAGNOSIS — E78.49 OTHER HYPERLIPIDEMIA: ICD-10-CM

## 2025-02-18 DIAGNOSIS — F51.01 PRIMARY INSOMNIA: ICD-10-CM

## 2025-02-18 PROCEDURE — 3077F SYST BP >= 140 MM HG: CPT | Performed by: FAMILY MEDICINE

## 2025-02-18 PROCEDURE — 99214 OFFICE O/P EST MOD 30 MIN: CPT | Performed by: FAMILY MEDICINE

## 2025-02-18 PROCEDURE — 1036F TOBACCO NON-USER: CPT | Performed by: FAMILY MEDICINE

## 2025-02-18 PROCEDURE — 3079F DIAST BP 80-89 MM HG: CPT | Performed by: FAMILY MEDICINE

## 2025-02-18 PROCEDURE — G8427 DOCREV CUR MEDS BY ELIG CLIN: HCPCS | Performed by: FAMILY MEDICINE

## 2025-02-18 PROCEDURE — G8417 CALC BMI ABV UP PARAM F/U: HCPCS | Performed by: FAMILY MEDICINE

## 2025-02-18 PROCEDURE — 3017F COLORECTAL CA SCREEN DOC REV: CPT | Performed by: FAMILY MEDICINE

## 2025-02-18 RX ORDER — HYDROCHLOROTHIAZIDE 25 MG/1
25 TABLET ORAL EVERY MORNING
Qty: 30 TABLET | Refills: 5 | Status: SHIPPED | OUTPATIENT
Start: 2025-02-18 | End: 2025-02-20

## 2025-02-18 RX ORDER — ALPRAZOLAM 2 MG/1
2 TABLET ORAL 2 TIMES DAILY PRN
Qty: 45 TABLET | Refills: 2 | Status: SHIPPED | OUTPATIENT
Start: 2025-02-18 | End: 2025-05-19

## 2025-02-18 RX ORDER — CLONAZEPAM 1 MG/1
2 TABLET ORAL NIGHTLY PRN
Qty: 60 TABLET | Refills: 2 | Status: SHIPPED | OUTPATIENT
Start: 2025-02-18 | End: 2025-05-19

## 2025-02-18 RX ORDER — AZELASTINE HYDROCHLORIDE 137 UG/1
SPRAY, METERED NASAL
Qty: 30 ML | Refills: 5 | Status: SHIPPED | OUTPATIENT
Start: 2025-02-18

## 2025-02-18 RX ORDER — FLUTICASONE PROPIONATE 50 MCG
SPRAY, SUSPENSION (ML) NASAL
Qty: 16 G | Refills: 5 | Status: SHIPPED | OUTPATIENT
Start: 2025-02-18

## 2025-02-18 SDOH — ECONOMIC STABILITY: INCOME INSECURITY: IN THE LAST 12 MONTHS, WAS THERE A TIME WHEN YOU WERE NOT ABLE TO PAY THE MORTGAGE OR RENT ON TIME?: NO

## 2025-02-18 SDOH — ECONOMIC STABILITY: FOOD INSECURITY: WITHIN THE PAST 12 MONTHS, YOU WORRIED THAT YOUR FOOD WOULD RUN OUT BEFORE YOU GOT MONEY TO BUY MORE.: NEVER TRUE

## 2025-02-18 SDOH — ECONOMIC STABILITY: FOOD INSECURITY: WITHIN THE PAST 12 MONTHS, THE FOOD YOU BOUGHT JUST DIDN'T LAST AND YOU DIDN'T HAVE MONEY TO GET MORE.: NEVER TRUE

## 2025-02-18 SDOH — ECONOMIC STABILITY: TRANSPORTATION INSECURITY
IN THE PAST 12 MONTHS, HAS LACK OF TRANSPORTATION KEPT YOU FROM MEETINGS, WORK, OR FROM GETTING THINGS NEEDED FOR DAILY LIVING?: NO

## 2025-02-18 ASSESSMENT — PATIENT HEALTH QUESTIONNAIRE - PHQ9
SUM OF ALL RESPONSES TO PHQ9 QUESTIONS 1 & 2: 0
2. FEELING DOWN, DEPRESSED OR HOPELESS: NOT AT ALL
SUM OF ALL RESPONSES TO PHQ QUESTIONS 1-9: 0
2. FEELING DOWN, DEPRESSED OR HOPELESS: NOT AT ALL
SUM OF ALL RESPONSES TO PHQ9 QUESTIONS 1 & 2: 0
SUM OF ALL RESPONSES TO PHQ QUESTIONS 1-9: 0
SUM OF ALL RESPONSES TO PHQ QUESTIONS 1-9: 0
1. LITTLE INTEREST OR PLEASURE IN DOING THINGS: NOT AT ALL
SUM OF ALL RESPONSES TO PHQ QUESTIONS 1-9: 0
1. LITTLE INTEREST OR PLEASURE IN DOING THINGS: NOT AT ALL

## 2025-02-18 NOTE — PROGRESS NOTES
Juan Francisco Ruiz (:  1970) is a 54 y.o. male,Established patient, here for evaluation of the following chief complaint(s):  3 Month Follow-Up      Assessment & Plan   ASSESSMENT/PLAN:  Juan Francisco \"Ian" was seen today for 3 month follow-up.    Diagnoses and all orders for this visit:    Dissection of aorta, unspecified portion of aorta (HCC)  Stable. Following with vascular  Primary insomnia  -     clonazePAM (KLONOPIN) 1 MG tablet; Take 2 tablets by mouth nightly as needed (sleep) for up to 90 days.  Stable on klonopin. No side effects.  Taking as prescribed  Controlled Substances Monitoring: Periodic Controlled Substance Monitoring: No signs of potential drug abuse or diversion identified., Possible medication side effects, risk of tolerance/dependence & alternative treatments discussed. (Gary Puente MD)   Chronic pancreatitis, unspecified pancreatitis type (HCC)  Stable. Following with GI  Benign neoplasm of trigeminal nerve (HCC)  Stable.  Hypogonadotropic hypogonadism (HCC)  Stable. Following with endo  Anxiety  -     ALPRAZolam (XANAX) 2 MG tablet; Take 1 tablet by mouth 2 times daily as needed for Anxiety for up to 90 days.  Stable on xanax  Taking as prescribed  Controlled Substances Monitoring: Periodic Controlled Substance Monitoring: No signs of potential drug abuse or diversion identified., Possible medication side effects, risk of tolerance/dependence & alternative treatments discussed. (Gary Puente MD)   Other orders  -     hydroCHLOROthiazide (HYDRODIURIL) 25 MG tablet; Take 1 tablet by mouth every morning         No follow-ups on file.         Subjective   SUBJECTIVE/OBJECTIVE:  HPI  Pt is a of 54 y.o. male comes in today with   Chief Complaint   Patient presents with    3 Month Follow-Up     Less activity since wife can't drive recently.  Still feels like he's holding water.    Vitals:    25 1417 25 1425   BP: (!) 144/88 (!) 146/86   Pulse: 88    SpO2: 95%    Weight: 103

## 2025-02-19 DIAGNOSIS — E78.49 OTHER HYPERLIPIDEMIA: ICD-10-CM

## 2025-02-19 RX ORDER — TESTOSTERONE 30 MG/1.5ML
SOLUTION TOPICAL
Qty: 1 EACH | Refills: 0 | Status: SHIPPED | OUTPATIENT
Start: 2025-02-19 | End: 2025-03-17

## 2025-02-19 RX ORDER — OMEGA-3-ACID ETHYL ESTERS 1 G/1
2 CAPSULE, LIQUID FILLED ORAL 2 TIMES DAILY
Qty: 120 CAPSULE | Refills: 5 | OUTPATIENT
Start: 2025-02-19

## 2025-02-19 NOTE — TELEPHONE ENCOUNTER
Medication:   Requested Prescriptions     Pending Prescriptions Disp Refills    Testosterone 30 MG/ACT SOLN [Pharmacy Med Name: TESTOSTERONE 30 MG/1.5 ML PUMP]       Sig: APPLY 1 PUMP TO THE SKIN 4 TIMES A DAY       Last Filled:      Patient Phone Number: [phone] (home)     Last appt: 11/12/2024   Next appt: 4/14/2025    Last Labs DM:   Lab Results   Component Value Date/Time    LABA1C 5.4 05/23/2016 09:20 PM     Last Lipid:   Lab Results   Component Value Date/Time    CHOL 181 01/18/2024 02:38 PM    TRIG 142 01/18/2024 02:38 PM    HDL 33 01/18/2024 02:38 PM    HDL 36 05/11/2012 10:08 AM     Last PSA:   Lab Results   Component Value Date/Time    PSA 0.80 01/18/2024 02:38 PM     Last Thyroid:   Lab Results   Component Value Date/Time    TSH 0.54 11/15/2024 08:49 AM    FT3 3.3 11/15/2024 08:49 AM    Q9IADRO 1.13 11/15/2024 08:49 AM    T4FREE 1.2 11/15/2024 08:49 AM

## 2025-02-20 DIAGNOSIS — E23.0 HYPOGONADOTROPIC HYPOGONADISM: ICD-10-CM

## 2025-02-20 RX ORDER — HYDROCHLOROTHIAZIDE 25 MG/1
25 TABLET ORAL EVERY MORNING
Qty: 30 TABLET | Refills: 5 | Status: SHIPPED | OUTPATIENT
Start: 2025-02-20

## 2025-02-20 RX ORDER — TESTOSTERONE 30 MG/1.5ML
SOLUTION TOPICAL
OUTPATIENT
Start: 2025-02-20

## 2025-02-20 NOTE — TELEPHONE ENCOUNTER
Medication:   Requested Prescriptions     Pending Prescriptions Disp Refills    hydroCHLOROthiazide (HYDRODIURIL) 25 MG tablet [Pharmacy Med Name: hydroCHLOROthiazide 25 MG TABLET] 30 tablet 5     Sig: TAKE 1 TABLET BY MOUTH EVERY MORNING        Last Filled:      Pended to: [facility] [id number] - [facility] [address] - P [phone] - F [phone]     Patient Phone Number: [phone] (home)     Last appt: 2/18/2025   Next appt: 5/12/2025    Last OARRS:       2/18/2025     2:31 PM   RX Monitoring   Periodic Controlled Substance Monitoring No signs of potential drug abuse or diversion identified.;Possible medication side effects, risk of tolerance/dependence & alternative treatments discussed.

## 2025-02-25 ENCOUNTER — HOSPITAL ENCOUNTER (OUTPATIENT)
Age: 55
Setting detail: SPECIMEN
Discharge: HOME | End: 2025-02-25

## 2025-02-26 ENCOUNTER — HOSPITAL ENCOUNTER (OUTPATIENT)
Age: 55
Setting detail: SPECIMEN
Discharge: HOME | End: 2025-02-26

## 2025-02-26 ENCOUNTER — HOSPITAL ENCOUNTER (OUTPATIENT)
Age: 55
Discharge: HOME | End: 2025-02-26
Payer: MEDICAID

## 2025-02-26 DIAGNOSIS — R79.89 ELEVATED INSULIN-LIKE GROWTH FACTOR 1 (IGF-1) LEVEL: ICD-10-CM

## 2025-02-26 DIAGNOSIS — E23.0 HYPOGONADOTROPIC HYPOGONADISM: ICD-10-CM

## 2025-02-26 DIAGNOSIS — E78.49 OTHER HYPERLIPIDEMIA: ICD-10-CM

## 2025-02-26 DIAGNOSIS — E55.9 VITAMIN D DEFICIENCY: ICD-10-CM

## 2025-02-26 DIAGNOSIS — D35.2 PITUITARY MICROADENOMA (HCC): ICD-10-CM

## 2025-02-26 DIAGNOSIS — Z86.39 HISTORY OF ADRENAL INSUFFICIENCY: ICD-10-CM

## 2025-02-26 DIAGNOSIS — E03.9 ACQUIRED HYPOTHYROIDISM: ICD-10-CM

## 2025-02-26 PROBLEM — E66.811 CLASS 1 OBESITY WITH SERIOUS COMORBIDITY AND BODY MASS INDEX (BMI) OF 33.0 TO 33.9 IN ADULT: Status: ACTIVE | Noted: 2025-02-26

## 2025-02-26 LAB
25(OH)D3 SERPL-MCNC: 32.2 NG/ML
ALBUMIN SERPL-MCNC: 4.6 G/DL (ref 3.4–5)
ALBUMIN/GLOB SERPL: 1.5 {RATIO} (ref 1.1–2.2)
ALP SERPL-CCNC: 71 U/L (ref 40–129)
ALT SERPL-CCNC: 35 U/L (ref 10–40)
ANION GAP SERPL CALCULATED.3IONS-SCNC: 10 MMOL/L (ref 3–16)
AST SERPL-CCNC: 31 U/L (ref 15–37)
BILIRUB SERPL-MCNC: 1.1 MG/DL (ref 0–1)
BUN SERPL-MCNC: 14 MG/DL (ref 7–20)
CALCIUM SERPL-MCNC: 9.9 MG/DL (ref 8.3–10.6)
CHLORIDE SERPL-SCNC: 97 MMOL/L (ref 99–110)
CO2 SERPL-SCNC: 33 MMOL/L (ref 21–32)
CORTIS AM PEAK SERPL-MCNC: 3.3 UG/DL (ref 4.3–22.4)
CREAT SERPL-MCNC: 0.9 MG/DL (ref 0.9–1.3)
DEPRECATED RDW RBC AUTO: 13.8 % (ref 12.4–15.4)
FOLATE SERPL-MCNC: 5.63 NG/ML (ref 4.78–24.2)
GFR SERPLBLD CREATININE-BSD FMLA CKD-EPI: >90 ML/MIN/{1.73_M2}
GLUCOSE SERPL-MCNC: 104 MG/DL (ref 70–99)
HCT VFR BLD AUTO: 55 % (ref 40.5–52.5)
HGB BLD-MCNC: 18 G/DL (ref 13.5–17.5)
MCH RBC QN AUTO: 28.3 PG (ref 26–34)
MCHC RBC AUTO-ENTMCNC: 32.7 G/DL (ref 31–36)
MCV RBC AUTO: 86.4 FL (ref 80–100)
PLATELET # BLD AUTO: 132 K/UL (ref 135–450)
PMV BLD AUTO: 7.7 FL (ref 5–10.5)
POTASSIUM SERPL-SCNC: 3.6 MMOL/L (ref 3.5–5.1)
PROLACTIN SERPL IA-MCNC: 10 NG/ML
PROT SERPL-MCNC: 7.7 G/DL (ref 6.4–8.2)
RBC # BLD AUTO: 6.37 M/UL (ref 4.2–5.9)
SODIUM SERPL-SCNC: 140 MMOL/L (ref 136–145)
T3FREE SERPL-MCNC: 3.9 PG/ML (ref 2.3–4.2)
T4 FREE SERPL-MCNC: 1.3 NG/DL (ref 0.9–1.8)
TSH SERPL DL<=0.005 MIU/L-ACNC: 2.3 UIU/ML (ref 0.27–4.2)
VIT B12 SERPL-MCNC: 402 PG/ML (ref 211–911)
WBC # BLD AUTO: 4.6 K/UL (ref 4–11)

## 2025-02-26 PROCEDURE — 85027 COMPLETE CBC AUTOMATED: CPT

## 2025-02-26 PROCEDURE — 84270 ASSAY OF SEX HORMONE GLOBUL: CPT

## 2025-02-26 PROCEDURE — 84481 FREE ASSAY (FT-3): CPT

## 2025-02-26 PROCEDURE — 84439 ASSAY OF FREE THYROXINE: CPT

## 2025-02-26 PROCEDURE — 80053 COMPREHEN METABOLIC PANEL: CPT

## 2025-02-26 PROCEDURE — 82024 ASSAY OF ACTH: CPT

## 2025-02-26 PROCEDURE — 82670 ASSAY OF TOTAL ESTRADIOL: CPT

## 2025-02-26 PROCEDURE — 84146 ASSAY OF PROLACTIN: CPT

## 2025-02-26 PROCEDURE — 82306 VITAMIN D 25 HYDROXY: CPT

## 2025-02-26 PROCEDURE — 82533 TOTAL CORTISOL: CPT

## 2025-02-26 PROCEDURE — 84443 ASSAY THYROID STIM HORMONE: CPT

## 2025-02-26 PROCEDURE — 84305 ASSAY OF SOMATOMEDIN: CPT

## 2025-02-26 PROCEDURE — 36415 COLL VENOUS BLD VENIPUNCTURE: CPT

## 2025-02-26 PROCEDURE — 84403 ASSAY OF TOTAL TESTOSTERONE: CPT

## 2025-02-26 PROCEDURE — 84402 ASSAY OF FREE TESTOSTERONE: CPT

## 2025-02-26 PROCEDURE — 82746 ASSAY OF FOLIC ACID SERUM: CPT

## 2025-02-26 PROCEDURE — 82607 VITAMIN B-12: CPT

## 2025-02-27 ENCOUNTER — OFFICE VISIT (OUTPATIENT)
Dept: ENDOCRINOLOGY | Age: 55
End: 2025-02-27

## 2025-02-27 ENCOUNTER — HOSPITAL ENCOUNTER (OUTPATIENT)
Age: 55
Setting detail: SPECIMEN
Discharge: HOME | End: 2025-02-27
Payer: MEDICAID

## 2025-02-27 VITALS
WEIGHT: 226 LBS | SYSTOLIC BLOOD PRESSURE: 131 MMHG | OXYGEN SATURATION: 95 % | HEART RATE: 97 BPM | BODY MASS INDEX: 33.47 KG/M2 | RESPIRATION RATE: 14 BRPM | HEIGHT: 69 IN | DIASTOLIC BLOOD PRESSURE: 93 MMHG | TEMPERATURE: 98 F

## 2025-02-27 DIAGNOSIS — L65.9 ALOPECIA: ICD-10-CM

## 2025-02-27 DIAGNOSIS — R79.89 ELEVATED INSULIN-LIKE GROWTH FACTOR 1 (IGF-1) LEVEL: ICD-10-CM

## 2025-02-27 DIAGNOSIS — R53.83 OTHER FATIGUE: ICD-10-CM

## 2025-02-27 DIAGNOSIS — E23.0 HYPOGONADOTROPIC HYPOGONADISM: Primary | ICD-10-CM

## 2025-02-27 DIAGNOSIS — E55.9 VITAMIN D DEFICIENCY: ICD-10-CM

## 2025-02-27 DIAGNOSIS — E66.811 CLASS 1 OBESITY WITH SERIOUS COMORBIDITY AND BODY MASS INDEX (BMI) OF 33.0 TO 33.9 IN ADULT, UNSPECIFIED OBESITY TYPE: ICD-10-CM

## 2025-02-27 DIAGNOSIS — E03.9 ACQUIRED HYPOTHYROIDISM: ICD-10-CM

## 2025-02-27 DIAGNOSIS — E78.49 OTHER HYPERLIPIDEMIA: ICD-10-CM

## 2025-02-27 DIAGNOSIS — D35.2 PITUITARY MICROADENOMA (HCC): ICD-10-CM

## 2025-02-27 DIAGNOSIS — Z86.39 HISTORY OF ADRENAL INSUFFICIENCY: ICD-10-CM

## 2025-02-27 DIAGNOSIS — N52.9 ERECTILE DYSFUNCTION, UNSPECIFIED ERECTILE DYSFUNCTION TYPE: ICD-10-CM

## 2025-02-27 DIAGNOSIS — Z91.89 AT HIGH RISK FOR CARDIOVASCULAR DISEASE: ICD-10-CM

## 2025-02-27 LAB — ESTRADIOL SERPL-MCNC: 64 PG/ML

## 2025-02-27 PROCEDURE — 82533 TOTAL CORTISOL: CPT

## 2025-02-27 RX ORDER — ERGOCALCIFEROL 1.25 MG/1
CAPSULE, LIQUID FILLED ORAL
Qty: 8 CAPSULE | Refills: 5 | Status: SHIPPED | OUTPATIENT
Start: 2025-02-27

## 2025-02-27 RX ORDER — TESTOSTERONE 30 MG/1.5ML
SOLUTION TOPICAL
Qty: 1 EACH | Refills: 2 | Status: SHIPPED | OUTPATIENT
Start: 2025-02-27 | End: 2025-02-27 | Stop reason: SINTOL

## 2025-02-27 RX ORDER — LEVOTHYROXINE SODIUM 75 UG/ML
1 SOLUTION ORAL DAILY
Qty: 30 ML | Refills: 3 | Status: SHIPPED | OUTPATIENT
Start: 2025-02-27

## 2025-02-27 RX ORDER — LEVOTHYROXINE SODIUM 50 UG/1
50 TABLET ORAL DAILY
Qty: 30 TABLET | Refills: 5 | Status: CANCELLED | OUTPATIENT
Start: 2025-02-27

## 2025-02-27 RX ORDER — SILDENAFIL 100 MG/1
100 TABLET, FILM COATED ORAL DAILY
Qty: 30 TABLET | Refills: 5 | Status: SHIPPED | OUTPATIENT
Start: 2025-02-27

## 2025-02-27 NOTE — PROGRESS NOTES
SUBJECTIVE:  Juan Francisco Ruiz is a 54 y.o. male who is being evaluated for hypogonadism.     1. Hypogonadotropic hypogonadism (HCC)  This started in 2006. Patient was diagnosed with hypogonadism. The problem has been unchanged.      Patient started medication in 2006. Currently patient is on: Axiron. Misses  0 doses a month.  In 2006 started hot flashes.  Had Androgel, testosterone injections before.  Had fluctuations on injections  On Axiron 4 applications daily  Axiron worked better than cypionate, but not enough.  Has ED, decreased libido.     Current complaints: fatigue, insomnia, muscle weakness, loss of eyebrows, sleepiness during the day, hair loss.  Wife had seizure, patient has a lot of stress.     2. 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  Later he took medication.  Has hand swelling  Shoe size increased 0.5 size  Ring size increased  Had carpal tunnel surgery  Had dental changes, gaps  Was on GH replacement therapy.   Not on it now.     3. History of adrenal insufficiency   On dexamethasone small dose, then weaned off.  In 2012 stopped medication  Was in [facility] for consultation  Not on medication since then.  Later on dexamethasone 0.5 mg daily. Run out.  He is not on dexamethasone   No problems when of dexamethasone.  No hypotension, nausea, vomiting     Current complaints: fatigue, insomnia, muscle weakness, loss of eyebrows, sleepiness during the day, hair loss.  Fatigue was severe.  On Axiron 4 applications daily     4. Pituitary microadenoma (HCC)  In 2012 Dx with pituitary microadenoma  In 2010 had severe headaches, migraines, Dx with cluster headaches.     Has Hx of pancreatitis due to gallstones.  Has ongoing pancreas issues since then  Sees Dr. Morrison.     History of obstructive symptoms: difficulty swallowing No, changes in voice/hoarseness

## 2025-02-28 DIAGNOSIS — D35.2 PITUITARY MICROADENOMA (HCC): ICD-10-CM

## 2025-03-01 LAB
ACTH PLAS-MCNC: 11 PG/ML (ref 7–63)
IGF-I SERPL-MCNC: 124 NG/ML (ref 59–206)
IGF-I Z-SCORE SERPL: 0.1
SHBG SERPL-SCNC: 53 NMOL/L (ref 19–76)
TESTOST FREE MFR SERPL: 1.5 % (ref 1.6–2.9)
TESTOST FREE SERPL-MCNC: 103 PG/ML (ref 47–244)
TESTOST SERPL-MCNC: 698 NG/DL (ref 300–890)
TESTOSTERONE.FREE+WB SERPL-MCNC: 294 NG/DL (ref 131–682)

## 2025-03-02 ENCOUNTER — TELEPHONE (OUTPATIENT)
Dept: ENDOCRINOLOGY | Age: 55
End: 2025-03-02

## 2025-03-03 ENCOUNTER — TELEPHONE (OUTPATIENT)
Dept: ENDOCRINOLOGY | Age: 55
End: 2025-03-03

## 2025-03-03 NOTE — TELEPHONE ENCOUNTER
Called and spoke with pt. Message was given verbatim. Pt stated he was tested for sleep apnea but was not diagnosed with it.

## 2025-03-03 NOTE — TELEPHONE ENCOUNTER
Submitted PA for Tirosint Sol  Via [employer]: [id number]   STATUS: Electronic prior authorization Not Supported as NDC not valid.     If this requires a response please respond to the pool ( P [facility] MEDICATION PRE-AUTH).      Thank you please advise patient.

## 2025-03-03 NOTE — TELEPHONE ENCOUNTER
Please advise patient that IGF-I, ACTH and testosterone were good.  Testosterone was in range.  Please ask patient if he was tested or diagnosed with sleep apnea.  That can increase hemoglobin hematocrit when treated with testosterone.

## 2025-03-07 LAB
CORTIS SAL-MCNC: 0.07 UG/DL
CORTIS SAL-MCNC: 0.07 UG/DL
CORTIS SAL-MCNC: <0.025 UG/DL

## 2025-03-13 DIAGNOSIS — E23.0 HYPOGONADOTROPIC HYPOGONADISM: ICD-10-CM

## 2025-03-14 ENCOUNTER — TELEPHONE (OUTPATIENT)
Dept: ENDOCRINOLOGY | Age: 55
End: 2025-03-14

## 2025-03-14 ENCOUNTER — PATIENT MESSAGE (OUTPATIENT)
Dept: FAMILY MEDICINE CLINIC | Age: 55
End: 2025-03-14

## 2025-03-14 ENCOUNTER — HOSPITAL ENCOUNTER (OUTPATIENT)
Age: 55
Discharge: HOME | End: 2025-03-14
Payer: MEDICAID

## 2025-03-14 DIAGNOSIS — E23.0 HYPOGONADOTROPIC HYPOGONADISM: ICD-10-CM

## 2025-03-14 LAB
DEPRECATED RDW RBC AUTO: 13.7 % (ref 12.4–15.4)
HCT VFR BLD AUTO: 50.6 % (ref 40.5–52.5)
HGB BLD-MCNC: 16.7 G/DL (ref 13.5–17.5)
MCH RBC QN AUTO: 28.1 PG (ref 26–34)
MCHC RBC AUTO-ENTMCNC: 33.1 G/DL (ref 31–36)
MCV RBC AUTO: 85 FL (ref 80–100)
PLATELET # BLD AUTO: 120 K/UL (ref 135–450)
PMV BLD AUTO: 7.7 FL (ref 5–10.5)
RBC # BLD AUTO: 5.95 M/UL (ref 4.2–5.9)
WBC # BLD AUTO: 5 K/UL (ref 4–11)

## 2025-03-14 PROCEDURE — 85027 COMPLETE CBC AUTOMATED: CPT

## 2025-03-14 PROCEDURE — 36415 COLL VENOUS BLD VENIPUNCTURE: CPT

## 2025-03-14 RX ORDER — TESTOSTERONE 30 MG/1.5ML
SOLUTION TOPICAL
OUTPATIENT
Start: 2025-03-14

## 2025-03-14 RX ORDER — AMOXICILLIN 500 MG/1
500 CAPSULE ORAL 3 TIMES DAILY
Qty: 30 CAPSULE | Refills: 0 | Status: SHIPPED | OUTPATIENT
Start: 2025-03-14 | End: 2025-03-24

## 2025-03-14 NOTE — TELEPHONE ENCOUNTER
Please submit PA for Testosterone 30 MG/ACT SOLN     Per pt- The prior auth is for the testosterone generic for axiron not the gel.

## 2025-03-15 ENCOUNTER — TELEPHONE (OUTPATIENT)
Dept: ENDOCRINOLOGY | Age: 55
End: 2025-03-15

## 2025-03-17 DIAGNOSIS — E78.49 OTHER HYPERLIPIDEMIA: ICD-10-CM

## 2025-03-17 RX ORDER — OLMESARTAN MEDOXOMIL 20 MG/1
20 TABLET ORAL DAILY
Qty: 30 TABLET | Refills: 0 | Status: SHIPPED | OUTPATIENT
Start: 2025-03-17

## 2025-03-17 RX ORDER — OMEGA-3-ACID ETHYL ESTERS 1 G/1
2 CAPSULE, LIQUID FILLED ORAL 2 TIMES DAILY
Qty: 120 CAPSULE | Refills: 5 | Status: SHIPPED | OUTPATIENT
Start: 2025-03-17

## 2025-03-18 NOTE — TELEPHONE ENCOUNTER
Submitted PA for Testosterone 30MG/ACT solution  Via [facility] [id number] STATUS: PENDING.    Follow up done daily; if no decision with in three days we will refax.  If another three days goes by with no decision will call the insurance for status.

## 2025-03-19 NOTE — TELEPHONE ENCOUNTER
The medication is APPROVED.    Outcome  Approved on March 18 by [place] [employer] 2017  Your PA request for [id number] was approved for 365 days. The PA# assigned is [id number]. Medication approved : TESTOSTERONE 30 MG/1.5 ML PUMP  Effective Date: 3/18/2025  Authorization Expiration Date: 3/17/2026    If this requires a response please respond to the pool ( P MHCX PSC MEDICATION PRE-AUTH).      Thank you please advise patient.

## 2025-03-25 ENCOUNTER — TELEMEDICINE (OUTPATIENT)
Dept: FAMILY MEDICINE CLINIC | Age: 55
End: 2025-03-25
Payer: MEDICAID

## 2025-03-25 DIAGNOSIS — K04.7 DENTAL ABSCESS: Primary | ICD-10-CM

## 2025-03-25 PROCEDURE — 3017F COLORECTAL CA SCREEN DOC REV: CPT | Performed by: NURSE PRACTITIONER

## 2025-03-25 PROCEDURE — G8417 CALC BMI ABV UP PARAM F/U: HCPCS | Performed by: NURSE PRACTITIONER

## 2025-03-25 PROCEDURE — 1036F TOBACCO NON-USER: CPT | Performed by: NURSE PRACTITIONER

## 2025-03-25 PROCEDURE — 99213 OFFICE O/P EST LOW 20 MIN: CPT | Performed by: NURSE PRACTITIONER

## 2025-03-25 PROCEDURE — G8427 DOCREV CUR MEDS BY ELIG CLIN: HCPCS | Performed by: NURSE PRACTITIONER

## 2025-03-25 RX ORDER — AMOXICILLIN 500 MG/1
500 TABLET, FILM COATED ORAL 3 TIMES DAILY
Qty: 15 TABLET | Refills: 0 | Status: SHIPPED | OUTPATIENT
Start: 2025-03-25 | End: 2025-03-30

## 2025-03-25 ASSESSMENT — ENCOUNTER SYMPTOMS
COLOR CHANGE: 0
EYE DISCHARGE: 0
CHOKING: 0
BLOOD IN STOOL: 0
DIARRHEA: 0
SHORTNESS OF BREATH: 0
SORE THROAT: 0
SINUS PRESSURE: 0
EYE ITCHING: 0
EYE PAIN: 0
NAUSEA: 0
COUGH: 0
VOICE CHANGE: 0
VOMITING: 0
PHOTOPHOBIA: 0
RHINORRHEA: 0
CONSTIPATION: 0
WHEEZING: 0
EYE REDNESS: 0
CHEST TIGHTNESS: 0
TROUBLE SWALLOWING: 0
STRIDOR: 0
ABDOMINAL PAIN: 0
SINUS PAIN: 0
BACK PAIN: 0

## 2025-04-02 RX ORDER — LISINOPRIL 40 MG/1
40 TABLET ORAL DAILY
Qty: 30 TABLET | Refills: 5 | Status: SHIPPED | OUTPATIENT
Start: 2025-04-02

## 2025-04-21 ENCOUNTER — TELEPHONE (OUTPATIENT)
Age: 55
End: 2025-04-21

## 2025-04-21 NOTE — TELEPHONE ENCOUNTER
Pt called states he has a spot on rt side of face and forehead. It is much bigger that the last one.It's raised and has crusted edges.He noticed it about 6 weeks ago      [phone]

## 2025-05-08 ENCOUNTER — OFFICE VISIT (OUTPATIENT)
Age: 55
End: 2025-05-08
Payer: MEDICAID

## 2025-05-08 DIAGNOSIS — L71.9 ROSACEA: ICD-10-CM

## 2025-05-08 DIAGNOSIS — E03.9 ACQUIRED HYPOTHYROIDISM: ICD-10-CM

## 2025-05-08 DIAGNOSIS — L82.0 SEBORRHEIC KERATOSES, INFLAMED: Primary | ICD-10-CM

## 2025-05-08 DIAGNOSIS — L82.1 SEBORRHEIC KERATOSES: ICD-10-CM

## 2025-05-08 PROCEDURE — 17110 DESTRUCTION B9 LES UP TO 14: CPT | Performed by: DERMATOLOGY

## 2025-05-08 PROCEDURE — 99213 OFFICE O/P EST LOW 20 MIN: CPT | Performed by: DERMATOLOGY

## 2025-05-08 RX ORDER — LEVOTHYROXINE SODIUM 75 UG/ML
SOLUTION ORAL
Qty: 30 ML | Refills: 0 | Status: SHIPPED | OUTPATIENT
Start: 2025-05-08

## 2025-05-08 NOTE — PROGRESS NOTES
[facility]  Mckenzie Mosher M.D.  [phone]       Juan Francisco Ruiz  1970    54 y.o. male     Date of Visit: 5/8/2025    Chief Complaint: No chief complaint on file.       I was asked to see this patient by Dr. Harrison ref. provider found.    History of Present Illness:  1.  Patient presents today for follow-up-new lesion right forehead.  Raised, becomes irritated.  Multiple other similar lesions across his forehead and hairline that are asymptomatic    Rosacea-has been on MetroCream 0.75% for years twice daily.  Uses consistently.  Noticing some flares on his right cheek-mostly erythematous without inflammatory papules.  Worse right after showering.    Skin history:     Rosacea-MetroCream 0.75% twice daily  Seborrheic dermatitis-ketoconazole 2% shampoo     Male pattern alopecia-discussed minoxidil 9/24    Review of Systems:  Constitutional: Reports general sense of well-being       Past Medical History, Surgical History, Family History, Medications and Allergies reviewed.    Social History:   Social History     Socioeconomic History    Marital status:      Spouse name: Not on file    Number of children: Not on file    Years of education: Not on file    Highest education level: Not on file   Occupational History    Not on file   Tobacco Use    Smoking status: Never     Passive exposure: Never    Smokeless tobacco: Never   Vaping Use    Vaping status: Never Used   Substance and Sexual Activity    Alcohol use: Never    Drug use: Never    Sexual activity: Not Currently     Partners: Female     Comment: ; 3 children   Other Topics Concern    Not on file   Social History Narrative    Not on file     Social Drivers of Health     Financial Resource Strain: Low Risk  (2/14/2024)    Overall Financial Resource Strain (CARDIA)     Difficulty of Paying Living Expenses: Not hard at all   Food Insecurity: No Food Insecurity (2/18/2025)    Hunger Vital Sign     Worried About Running Out of Food in the

## 2025-05-12 ENCOUNTER — OFFICE VISIT (OUTPATIENT)
Dept: FAMILY MEDICINE CLINIC | Age: 55
End: 2025-05-12
Payer: MEDICAID

## 2025-05-12 VITALS
HEART RATE: 76 BPM | WEIGHT: 224 LBS | DIASTOLIC BLOOD PRESSURE: 66 MMHG | HEIGHT: 69 IN | OXYGEN SATURATION: 98 % | SYSTOLIC BLOOD PRESSURE: 106 MMHG | BODY MASS INDEX: 33.18 KG/M2

## 2025-05-12 DIAGNOSIS — F41.9 ANXIETY: ICD-10-CM

## 2025-05-12 DIAGNOSIS — F51.01 PRIMARY INSOMNIA: ICD-10-CM

## 2025-05-12 PROCEDURE — G8417 CALC BMI ABV UP PARAM F/U: HCPCS | Performed by: FAMILY MEDICINE

## 2025-05-12 PROCEDURE — 99213 OFFICE O/P EST LOW 20 MIN: CPT | Performed by: FAMILY MEDICINE

## 2025-05-12 PROCEDURE — 3074F SYST BP LT 130 MM HG: CPT | Performed by: FAMILY MEDICINE

## 2025-05-12 PROCEDURE — G8427 DOCREV CUR MEDS BY ELIG CLIN: HCPCS | Performed by: FAMILY MEDICINE

## 2025-05-12 PROCEDURE — 3078F DIAST BP <80 MM HG: CPT | Performed by: FAMILY MEDICINE

## 2025-05-12 PROCEDURE — 3017F COLORECTAL CA SCREEN DOC REV: CPT | Performed by: FAMILY MEDICINE

## 2025-05-12 PROCEDURE — 1036F TOBACCO NON-USER: CPT | Performed by: FAMILY MEDICINE

## 2025-05-12 RX ORDER — ALPRAZOLAM 2 MG/1
2 TABLET ORAL 2 TIMES DAILY PRN
Qty: 45 TABLET | Refills: 2 | Status: SHIPPED | OUTPATIENT
Start: 2025-05-12 | End: 2025-08-10

## 2025-05-12 RX ORDER — CLONAZEPAM 1 MG/1
2 TABLET ORAL NIGHTLY PRN
Qty: 60 TABLET | Refills: 2 | Status: SHIPPED | OUTPATIENT
Start: 2025-05-12 | End: 2025-08-10

## 2025-05-12 SDOH — ECONOMIC STABILITY: FOOD INSECURITY: WITHIN THE PAST 12 MONTHS, YOU WORRIED THAT YOUR FOOD WOULD RUN OUT BEFORE YOU GOT MONEY TO BUY MORE.: NEVER TRUE

## 2025-05-12 SDOH — ECONOMIC STABILITY: FOOD INSECURITY: WITHIN THE PAST 12 MONTHS, THE FOOD YOU BOUGHT JUST DIDN'T LAST AND YOU DIDN'T HAVE MONEY TO GET MORE.: NEVER TRUE

## 2025-05-12 ASSESSMENT — PATIENT HEALTH QUESTIONNAIRE - PHQ9
2. FEELING DOWN, DEPRESSED OR HOPELESS: NOT AT ALL
SUM OF ALL RESPONSES TO PHQ QUESTIONS 1-9: 0
SUM OF ALL RESPONSES TO PHQ QUESTIONS 1-9: 0
1. LITTLE INTEREST OR PLEASURE IN DOING THINGS: NOT AT ALL
SUM OF ALL RESPONSES TO PHQ QUESTIONS 1-9: 0
SUM OF ALL RESPONSES TO PHQ QUESTIONS 1-9: 0

## 2025-05-12 NOTE — PROGRESS NOTES
Juan Francisco Ruiz (:  1970) is a 54 y.o. male,Established patient, here for evaluation of the following chief complaint(s):  Anxiety (Follow up/ med check )      Assessment & Plan   ASSESSMENT/PLAN:  Juan Francisco \"Jeet\" was seen today for anxiety.    Diagnoses and all orders for this visit:    Anxiety  Stable on klonopin  Primary insomnia  Stable on xanax    Controlled Substances Monitoring: Periodic Controlled Substance Monitoring: Possible medication side effects, risk of tolerance/dependence & alternative treatments discussed., No signs of potential drug abuse or diversion identified. (Gary Puente MD)    No follow-ups on file.         Subjective   SUBJECTIVE/OBJECTIVE:  HPI  Pt is a of 54 y.o. male comes in today with   Chief Complaint   Patient presents with    Anxiety     Follow up/ med check      blood pressure better on lisinopril  Taking meds as prescribed.    Vitals:    25 1439   BP: 106/66   Pulse: 76   SpO2: 98%   Weight: 101.6 kg (224 lb)   Height: 1.753 m (5' 9\")      Review of Systems       Objective   Physical Exam         An electronic signature was used to authenticate this note.    --Gary Puente MD

## 2025-05-22 NOTE — TELEPHONE ENCOUNTER
Patient aware.   
Please inform patient that ACTH stimulation test results were good.    
not applicable

## 2025-06-02 ENCOUNTER — HOSPITAL ENCOUNTER (OUTPATIENT)
Age: 55
Discharge: HOME | End: 2025-06-02
Payer: MEDICAID

## 2025-06-02 DIAGNOSIS — D35.2 PITUITARY MICROADENOMA (HCC): ICD-10-CM

## 2025-06-02 DIAGNOSIS — E23.0 HYPOGONADOTROPIC HYPOGONADISM: ICD-10-CM

## 2025-06-02 DIAGNOSIS — E55.9 VITAMIN D DEFICIENCY: ICD-10-CM

## 2025-06-02 DIAGNOSIS — E03.9 ACQUIRED HYPOTHYROIDISM: ICD-10-CM

## 2025-06-02 DIAGNOSIS — E78.49 OTHER HYPERLIPIDEMIA: ICD-10-CM

## 2025-06-02 DIAGNOSIS — N52.9 ERECTILE DYSFUNCTION, UNSPECIFIED ERECTILE DYSFUNCTION TYPE: ICD-10-CM

## 2025-06-02 DIAGNOSIS — Z86.39 HISTORY OF ADRENAL INSUFFICIENCY: ICD-10-CM

## 2025-06-02 DIAGNOSIS — L65.9 ALOPECIA: ICD-10-CM

## 2025-06-02 DIAGNOSIS — R79.89 ELEVATED INSULIN-LIKE GROWTH FACTOR 1 (IGF-1) LEVEL: ICD-10-CM

## 2025-06-02 DIAGNOSIS — E66.811 CLASS 1 OBESITY WITH SERIOUS COMORBIDITY AND BODY MASS INDEX (BMI) OF 33.0 TO 33.9 IN ADULT, UNSPECIFIED OBESITY TYPE: ICD-10-CM

## 2025-06-02 LAB
ALBUMIN SERPL-MCNC: 4.3 G/DL (ref 3.4–5)
ALBUMIN/GLOB SERPL: 1.3 {RATIO} (ref 1.1–2.2)
ALP SERPL-CCNC: 73 U/L (ref 40–129)
ALT SERPL-CCNC: 52 U/L (ref 10–40)
ANION GAP SERPL CALCULATED.3IONS-SCNC: 10 MMOL/L (ref 3–16)
AST SERPL-CCNC: 38 U/L (ref 15–37)
BILIRUB SERPL-MCNC: 0.5 MG/DL (ref 0–1)
BUN SERPL-MCNC: 19 MG/DL (ref 7–20)
CALCIUM SERPL-MCNC: 10 MG/DL (ref 8.3–10.6)
CHLORIDE SERPL-SCNC: 102 MMOL/L (ref 99–110)
CO2 SERPL-SCNC: 30 MMOL/L (ref 21–32)
CREAT SERPL-MCNC: 1 MG/DL (ref 0.9–1.3)
DEPRECATED RDW RBC AUTO: 13.2 % (ref 12.4–15.4)
GFR SERPLBLD CREATININE-BSD FMLA CKD-EPI: 88 ML/MIN/{1.73_M2}
GLUCOSE SERPL-MCNC: 108 MG/DL (ref 70–99)
HCT VFR BLD AUTO: 45.9 % (ref 40.5–52.5)
HGB BLD-MCNC: 15.3 G/DL (ref 13.5–17.5)
MCH RBC QN AUTO: 28.1 PG (ref 26–34)
MCHC RBC AUTO-ENTMCNC: 33.2 G/DL (ref 31–36)
MCV RBC AUTO: 84.5 FL (ref 80–100)
PLATELET # BLD AUTO: 116 K/UL (ref 135–450)
PMV BLD AUTO: 7.4 FL (ref 5–10.5)
POTASSIUM SERPL-SCNC: 4.5 MMOL/L (ref 3.5–5.1)
PROT SERPL-MCNC: 7.5 G/DL (ref 6.4–8.2)
RBC # BLD AUTO: 5.43 M/UL (ref 4.2–5.9)
SODIUM SERPL-SCNC: 142 MMOL/L (ref 136–145)
WBC # BLD AUTO: 5.2 K/UL (ref 4–11)

## 2025-06-02 PROCEDURE — 86800 THYROGLOBULIN ANTIBODY: CPT

## 2025-06-02 PROCEDURE — 84153 ASSAY OF PSA TOTAL: CPT

## 2025-06-02 PROCEDURE — 82024 ASSAY OF ACTH: CPT

## 2025-06-02 PROCEDURE — 84270 ASSAY OF SEX HORMONE GLOBUL: CPT

## 2025-06-02 PROCEDURE — 80053 COMPREHEN METABOLIC PANEL: CPT

## 2025-06-02 PROCEDURE — 84403 ASSAY OF TOTAL TESTOSTERONE: CPT

## 2025-06-02 PROCEDURE — 86376 MICROSOMAL ANTIBODY EACH: CPT

## 2025-06-02 PROCEDURE — 82533 TOTAL CORTISOL: CPT

## 2025-06-02 PROCEDURE — 82306 VITAMIN D 25 HYDROXY: CPT

## 2025-06-02 PROCEDURE — 84481 FREE ASSAY (FT-3): CPT

## 2025-06-02 PROCEDURE — 84443 ASSAY THYROID STIM HORMONE: CPT

## 2025-06-02 PROCEDURE — 85027 COMPLETE CBC AUTOMATED: CPT

## 2025-06-02 PROCEDURE — 36415 COLL VENOUS BLD VENIPUNCTURE: CPT

## 2025-06-02 PROCEDURE — 84439 ASSAY OF FREE THYROXINE: CPT

## 2025-06-02 PROCEDURE — 82607 VITAMIN B-12: CPT

## 2025-06-02 PROCEDURE — 82746 ASSAY OF FOLIC ACID SERUM: CPT

## 2025-06-03 ENCOUNTER — OFFICE VISIT (OUTPATIENT)
Dept: ENDOCRINOLOGY | Age: 55
End: 2025-06-03
Payer: MEDICAID

## 2025-06-03 VITALS
HEART RATE: 74 BPM | HEIGHT: 69 IN | DIASTOLIC BLOOD PRESSURE: 75 MMHG | WEIGHT: 228 LBS | OXYGEN SATURATION: 95 % | BODY MASS INDEX: 33.77 KG/M2 | SYSTOLIC BLOOD PRESSURE: 113 MMHG | RESPIRATION RATE: 14 BRPM | TEMPERATURE: 98 F

## 2025-06-03 DIAGNOSIS — E78.49 OTHER HYPERLIPIDEMIA: ICD-10-CM

## 2025-06-03 DIAGNOSIS — L65.9 ALOPECIA: ICD-10-CM

## 2025-06-03 DIAGNOSIS — E03.9 ACQUIRED HYPOTHYROIDISM: ICD-10-CM

## 2025-06-03 DIAGNOSIS — R79.89 ELEVATED LIVER FUNCTION TESTS: ICD-10-CM

## 2025-06-03 DIAGNOSIS — E66.811 CLASS 1 OBESITY WITH SERIOUS COMORBIDITY AND BODY MASS INDEX (BMI) OF 33.0 TO 33.9 IN ADULT, UNSPECIFIED OBESITY TYPE: ICD-10-CM

## 2025-06-03 DIAGNOSIS — R79.89 ELEVATED INSULIN-LIKE GROWTH FACTOR 1 (IGF-1) LEVEL: ICD-10-CM

## 2025-06-03 DIAGNOSIS — N52.9 ERECTILE DYSFUNCTION, UNSPECIFIED ERECTILE DYSFUNCTION TYPE: ICD-10-CM

## 2025-06-03 DIAGNOSIS — R53.83 OTHER FATIGUE: ICD-10-CM

## 2025-06-03 DIAGNOSIS — E55.9 VITAMIN D DEFICIENCY: ICD-10-CM

## 2025-06-03 DIAGNOSIS — D35.2 PITUITARY MICROADENOMA (HCC): ICD-10-CM

## 2025-06-03 DIAGNOSIS — Z86.39 HISTORY OF ADRENAL INSUFFICIENCY: ICD-10-CM

## 2025-06-03 DIAGNOSIS — Z91.89 AT HIGH RISK FOR CARDIOVASCULAR DISEASE: ICD-10-CM

## 2025-06-03 DIAGNOSIS — E23.0 HYPOGONADOTROPIC HYPOGONADISM: Primary | ICD-10-CM

## 2025-06-03 LAB
25(OH)D3 SERPL-MCNC: 33.2 NG/ML
CORTIS SERPL-MCNC: 12.8 UG/DL
FOLATE SERPL-MCNC: 7.61 NG/ML (ref 4.78–24.2)
PSA SERPL DL<=0.01 NG/ML-MCNC: 0.86 NG/ML (ref 0–4)
T3 SERPL-MCNC: 1.15 NG/ML (ref 0.8–2)
T3FREE SERPL-MCNC: 3.2 PG/ML (ref 2.3–4.2)
T4 FREE SERPL-MCNC: 1.3 NG/DL (ref 0.9–1.8)
T4 SERPL-MCNC: 8 UG/DL (ref 4.5–10.9)
THYROGLOB AB SERPL IA-ACNC: <15 IU/ML
THYROPEROXIDASE AB SERPL IA-ACNC: 9 IU/ML
TSH SERPL DL<=0.005 MIU/L-ACNC: 2.09 UIU/ML (ref 0.27–4.2)
VIT B12 SERPL-MCNC: 442 PG/ML (ref 211–911)

## 2025-06-03 PROCEDURE — G8417 CALC BMI ABV UP PARAM F/U: HCPCS | Performed by: INTERNAL MEDICINE

## 2025-06-03 PROCEDURE — 3078F DIAST BP <80 MM HG: CPT | Performed by: INTERNAL MEDICINE

## 2025-06-03 PROCEDURE — 3017F COLORECTAL CA SCREEN DOC REV: CPT | Performed by: INTERNAL MEDICINE

## 2025-06-03 PROCEDURE — 1036F TOBACCO NON-USER: CPT | Performed by: INTERNAL MEDICINE

## 2025-06-03 PROCEDURE — 99215 OFFICE O/P EST HI 40 MIN: CPT | Performed by: INTERNAL MEDICINE

## 2025-06-03 PROCEDURE — G8427 DOCREV CUR MEDS BY ELIG CLIN: HCPCS | Performed by: INTERNAL MEDICINE

## 2025-06-03 PROCEDURE — 3074F SYST BP LT 130 MM HG: CPT | Performed by: INTERNAL MEDICINE

## 2025-06-03 RX ORDER — TESTOSTERONE 30 MG/1.5ML
SOLUTION TOPICAL
Qty: 180 EACH | Refills: 3 | Status: SHIPPED | OUTPATIENT
Start: 2025-06-03 | End: 2025-07-04

## 2025-06-03 RX ORDER — LEVOTHYROXINE SODIUM 75 UG/ML
SOLUTION ORAL
Qty: 30 ML | Refills: 3 | Status: CANCELLED | OUTPATIENT
Start: 2025-06-03

## 2025-06-03 RX ORDER — OMEGA-3-ACID ETHYL ESTERS 1 G/1
2 CAPSULE, LIQUID FILLED ORAL 2 TIMES DAILY
Qty: 120 CAPSULE | Refills: 5 | Status: SHIPPED | OUTPATIENT
Start: 2025-06-03

## 2025-06-03 RX ORDER — LEVOTHYROXINE SODIUM 88 UG/ML
1 SOLUTION ORAL DAILY
Qty: 90 ML | Refills: 0
Start: 2025-06-03

## 2025-06-03 RX ORDER — ERGOCALCIFEROL 1.25 MG/1
CAPSULE, LIQUID FILLED ORAL
Qty: 8 CAPSULE | Refills: 5 | Status: SHIPPED | OUTPATIENT
Start: 2025-06-03

## 2025-06-03 RX ORDER — SILDENAFIL 100 MG/1
100 TABLET, FILM COATED ORAL DAILY
Qty: 30 TABLET | Refills: 5 | Status: SHIPPED | OUTPATIENT
Start: 2025-06-03

## 2025-06-03 NOTE — PROGRESS NOTES
SUBJECTIVE:  Juan Francisco Ruiz is a 54 y.o. male who is being evaluated for hypogonadism.     1. Hypogonadotropic hypogonadism (HCC)  This started in 2006. Patient was diagnosed with hypogonadism. The problem has been unchanged.      Patient started medication in 2006. Currently patient is on: Axiron. Misses  0 doses a month.  In 2006 started hot flashes.  Had Androgel, testosterone injections before.  Had fluctuations on injections  On Axiron 4 applications daily  Axiron worked better than cypionate, but not enough.  Has ED, decreased libido.     Current complaints: fatigue, insomnia, muscle weakness, loss of eyebrows, sleepiness during the day, hair loss.  Wife had seizure, patient has a lot of stress.     2. 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  Later he took medication.  Has hand swelling  Shoe size increased 0.5 size  Ring size increased  Had carpal tunnel surgery  Had dental changes, gaps  Was on GH replacement therapy.   Not on it now.     3. History of adrenal insufficiency   On dexamethasone small dose, then weaned off.  In 2012 stopped medication  Was in [facility] for consultation  Not on medication since then.  Later on dexamethasone 0.5 mg daily. Run out.  He is not on dexamethasone   No problems when of dexamethasone.  No hypotension, nausea, vomiting     Current complaints: fatigue, insomnia, muscle weakness, loss of eyebrows, sleepiness during the day, hair loss.  Fatigue was severe.  On Axiron 4 applications daily     4. Pituitary microadenoma (HCC)  In 2012 Dx with pituitary microadenoma  In 2010 had severe headaches, migraines, Dx with cluster headaches.     Has Hx of pancreatitis due to gallstones.  Has ongoing pancreas issues since then  Sees Dr. Morrison.     History of obstructive symptoms: difficulty swallowing No, changes in voice/hoarseness

## 2025-06-05 LAB
ACTH PLAS-MCNC: 47 PG/ML (ref 7–63)
SHBG SERPL-SCNC: 68 NMOL/L (ref 19–76)
TESTOST FREE SERPL-MCNC: 11.9 PG/ML (ref 47–244)
TESTOST SERPL-MCNC: 106 NG/DL (ref 193–740)

## 2025-06-06 ENCOUNTER — TRANSCRIBE ORDERS (OUTPATIENT)
Dept: ADMINISTRATIVE | Age: 55
End: 2025-06-06

## 2025-06-06 DIAGNOSIS — R74.8 ELEVATED LIVER ENZYMES: Primary | ICD-10-CM

## 2025-06-11 ENCOUNTER — TRANSCRIBE ORDERS (OUTPATIENT)
Dept: LAB | Age: 55
End: 2025-06-11

## 2025-06-11 ENCOUNTER — HOSPITAL ENCOUNTER (OUTPATIENT)
Dept: ULTRASOUND IMAGING | Age: 55
Discharge: HOME | End: 2025-06-11
Payer: MEDICAID

## 2025-06-11 ENCOUNTER — HOSPITAL ENCOUNTER (OUTPATIENT)
Dept: LAB | Age: 55
Discharge: HOME | End: 2025-06-11
Payer: MEDICAID

## 2025-06-11 DIAGNOSIS — K86.1 CHRONIC FIBROSING PANCREATITIS (HCC): Primary | ICD-10-CM

## 2025-06-11 DIAGNOSIS — R74.8 ACID PHOSPHATASE ELEVATED: ICD-10-CM

## 2025-06-11 DIAGNOSIS — R74.8 ELEVATED LIVER ENZYMES: ICD-10-CM

## 2025-06-11 DIAGNOSIS — K86.1 CHRONIC FIBROSING PANCREATITIS (HCC): ICD-10-CM

## 2025-06-11 LAB
EST. AVERAGE GLUCOSE BLD GHB EST-MCNC: 108.3 MG/DL
HAV IGM SERPL QL IA: NORMAL
HBA1C MFR BLD: 5.4 %
HBV CORE IGM SERPL QL IA: NORMAL
HBV SURFACE AG SERPL QL IA: NORMAL
HCV AB SERPL QL IA: NORMAL
IRON SATN MFR SERPL: 51 % (ref 20–50)
IRON SERPL-MCNC: 122 UG/DL (ref 59–158)
TIBC SERPL-MCNC: 239 UG/DL (ref 260–445)

## 2025-06-11 PROCEDURE — 83036 HEMOGLOBIN GLYCOSYLATED A1C: CPT

## 2025-06-11 PROCEDURE — 36415 COLL VENOUS BLD VENIPUNCTURE: CPT

## 2025-06-11 PROCEDURE — 76705 ECHO EXAM OF ABDOMEN: CPT

## 2025-06-11 PROCEDURE — 83540 ASSAY OF IRON: CPT

## 2025-06-11 PROCEDURE — 82390 ASSAY OF CERULOPLASMIN: CPT

## 2025-06-11 PROCEDURE — 80074 ACUTE HEPATITIS PANEL: CPT

## 2025-06-11 PROCEDURE — 83550 IRON BINDING TEST: CPT

## 2025-06-13 LAB — CERULOPLASMIN SERPL-MCNC: 21 MG/DL (ref 15–30)

## 2025-07-29 DIAGNOSIS — E03.9 ACQUIRED HYPOTHYROIDISM: ICD-10-CM

## 2025-07-30 RX ORDER — LEVOTHYROXINE SODIUM 88 UG/ML
1 SOLUTION ORAL DAILY
Qty: 90 ML | Refills: 0 | Status: SHIPPED | OUTPATIENT
Start: 2025-07-30

## 2025-08-11 ENCOUNTER — PATIENT MESSAGE (OUTPATIENT)
Dept: FAMILY MEDICINE CLINIC | Age: 55
End: 2025-08-11

## 2025-08-11 DIAGNOSIS — F51.01 PRIMARY INSOMNIA: ICD-10-CM

## 2025-08-11 DIAGNOSIS — F41.9 ANXIETY: ICD-10-CM

## 2025-08-11 RX ORDER — FLUTICASONE PROPIONATE 50 MCG
SPRAY, SUSPENSION (ML) NASAL
Qty: 16 ML | Refills: 2 | Status: SHIPPED | OUTPATIENT
Start: 2025-08-11

## 2025-08-11 RX ORDER — ALPRAZOLAM 2 MG/1
TABLET ORAL
Qty: 45 TABLET | OUTPATIENT
Start: 2025-08-11

## 2025-08-11 RX ORDER — CLONAZEPAM 1 MG/1
TABLET ORAL
Qty: 60 TABLET | OUTPATIENT
Start: 2025-08-11

## 2025-08-14 RX ORDER — CLONAZEPAM 1 MG/1
2 TABLET ORAL NIGHTLY PRN
Qty: 20 TABLET | Refills: 0 | Status: SHIPPED | OUTPATIENT
Start: 2025-08-14 | End: 2025-08-24

## 2025-08-14 RX ORDER — ALPRAZOLAM 2 MG/1
2 TABLET ORAL 2 TIMES DAILY PRN
Qty: 15 TABLET | Refills: 0 | Status: SHIPPED | OUTPATIENT
Start: 2025-08-14 | End: 2025-08-24

## 2025-09-04 ENCOUNTER — TELEMEDICINE (OUTPATIENT)
Dept: ENDOCRINOLOGY | Age: 55
End: 2025-09-04
Payer: MEDICAID

## 2025-09-04 ENCOUNTER — OFFICE VISIT (OUTPATIENT)
Dept: FAMILY MEDICINE CLINIC | Age: 55
End: 2025-09-04
Payer: MEDICAID

## 2025-09-04 VITALS
DIASTOLIC BLOOD PRESSURE: 80 MMHG | WEIGHT: 211.6 LBS | HEIGHT: 69 IN | BODY MASS INDEX: 31.34 KG/M2 | SYSTOLIC BLOOD PRESSURE: 132 MMHG | HEART RATE: 80 BPM | OXYGEN SATURATION: 97 % | TEMPERATURE: 97.6 F

## 2025-09-04 DIAGNOSIS — R79.89 ELEVATED LIVER FUNCTION TESTS: ICD-10-CM

## 2025-09-04 DIAGNOSIS — N52.9 ERECTILE DYSFUNCTION, UNSPECIFIED ERECTILE DYSFUNCTION TYPE: ICD-10-CM

## 2025-09-04 DIAGNOSIS — E03.9 ACQUIRED HYPOTHYROIDISM: ICD-10-CM

## 2025-09-04 DIAGNOSIS — E66.811 CLASS 1 OBESITY WITH SERIOUS COMORBIDITY AND BODY MASS INDEX (BMI) OF 31.0 TO 31.9 IN ADULT, UNSPECIFIED OBESITY TYPE: ICD-10-CM

## 2025-09-04 DIAGNOSIS — F41.9 ANXIETY: ICD-10-CM

## 2025-09-04 DIAGNOSIS — E55.9 VITAMIN D DEFICIENCY: ICD-10-CM

## 2025-09-04 DIAGNOSIS — Z86.39 HISTORY OF ADRENAL INSUFFICIENCY: ICD-10-CM

## 2025-09-04 DIAGNOSIS — I10 BENIGN HYPERTENSION: Primary | ICD-10-CM

## 2025-09-04 DIAGNOSIS — F51.01 PRIMARY INSOMNIA: ICD-10-CM

## 2025-09-04 DIAGNOSIS — D35.2 PITUITARY MICROADENOMA (HCC): ICD-10-CM

## 2025-09-04 DIAGNOSIS — E78.49 OTHER HYPERLIPIDEMIA: ICD-10-CM

## 2025-09-04 DIAGNOSIS — E23.0 HYPOGONADOTROPIC HYPOGONADISM: Primary | ICD-10-CM

## 2025-09-04 PROCEDURE — 3075F SYST BP GE 130 - 139MM HG: CPT | Performed by: FAMILY MEDICINE

## 2025-09-04 PROCEDURE — G8427 DOCREV CUR MEDS BY ELIG CLIN: HCPCS | Performed by: INTERNAL MEDICINE

## 2025-09-04 PROCEDURE — 99214 OFFICE O/P EST MOD 30 MIN: CPT | Performed by: FAMILY MEDICINE

## 2025-09-04 PROCEDURE — 3017F COLORECTAL CA SCREEN DOC REV: CPT | Performed by: FAMILY MEDICINE

## 2025-09-04 PROCEDURE — 3079F DIAST BP 80-89 MM HG: CPT | Performed by: FAMILY MEDICINE

## 2025-09-04 PROCEDURE — 99214 OFFICE O/P EST MOD 30 MIN: CPT | Performed by: INTERNAL MEDICINE

## 2025-09-04 PROCEDURE — G8417 CALC BMI ABV UP PARAM F/U: HCPCS | Performed by: FAMILY MEDICINE

## 2025-09-04 PROCEDURE — 3017F COLORECTAL CA SCREEN DOC REV: CPT | Performed by: INTERNAL MEDICINE

## 2025-09-04 PROCEDURE — G8427 DOCREV CUR MEDS BY ELIG CLIN: HCPCS | Performed by: FAMILY MEDICINE

## 2025-09-04 PROCEDURE — 1036F TOBACCO NON-USER: CPT | Performed by: FAMILY MEDICINE

## 2025-09-04 RX ORDER — CLONAZEPAM 1 MG/1
2 TABLET ORAL NIGHTLY PRN
Qty: 60 TABLET | Refills: 2 | Status: SHIPPED | OUTPATIENT
Start: 2025-09-04 | End: 2025-12-03

## 2025-09-04 RX ORDER — ALPRAZOLAM 2 MG/1
2 TABLET ORAL 2 TIMES DAILY PRN
Qty: 45 TABLET | Refills: 2 | Status: SHIPPED | OUTPATIENT
Start: 2025-09-04 | End: 2025-12-03

## 2025-09-04 RX ORDER — TESTOSTERONE 30 MG/1.5ML
SOLUTION TOPICAL
Qty: 180 EACH | Refills: 3 | Status: SHIPPED | OUTPATIENT
Start: 2025-09-04 | End: 2025-12-06

## 2025-09-04 RX ORDER — LEVOTHYROXINE SODIUM 88 UG/ML
1 SOLUTION ORAL DAILY
Qty: 90 ML | Refills: 1 | Status: CANCELLED | OUTPATIENT
Start: 2025-09-04

## 2025-09-04 RX ORDER — LISINOPRIL 40 MG/1
40 TABLET ORAL DAILY
Qty: 30 TABLET | Refills: 5 | Status: SHIPPED | OUTPATIENT
Start: 2025-09-04

## 2025-09-04 RX ORDER — OMEGA-3-ACID ETHYL ESTERS 1 G/1
2 CAPSULE, LIQUID FILLED ORAL 2 TIMES DAILY
Qty: 120 CAPSULE | Refills: 5 | Status: SHIPPED | OUTPATIENT
Start: 2025-09-04

## 2025-09-04 RX ORDER — ERGOCALCIFEROL 1.25 MG/1
CAPSULE, LIQUID FILLED ORAL
Qty: 8 CAPSULE | Refills: 5 | Status: SHIPPED | OUTPATIENT
Start: 2025-09-04

## 2025-09-04 RX ORDER — KETOCONAZOLE 20 MG/ML
SHAMPOO, SUSPENSION TOPICAL
Qty: 120 ML | Refills: 11 | Status: SHIPPED | OUTPATIENT
Start: 2025-09-04

## 2025-09-04 RX ORDER — BACLOFEN 10 MG/1
TABLET ORAL
COMMUNITY
Start: 2025-08-12

## 2025-09-04 RX ORDER — SILDENAFIL 100 MG/1
100 TABLET, FILM COATED ORAL DAILY
Qty: 30 TABLET | Refills: 5 | Status: SHIPPED | OUTPATIENT
Start: 2025-09-04

## 2025-09-04 RX ORDER — SODIUM FLUORIDE 1.1 G/100G
CREAM ORAL
COMMUNITY
Start: 2025-09-02

## (undated) DEVICE — INTENDED USE FOR SURGICAL MARKING ON INTACT SKIN, ALSO PROVIDES A PERMANENT METHOD OF IDENTIFYING OBJECTS IN THE OPERATING ROOM: Brand: WRITESITE® PLUS MINI PREP RESISTANT MARKER

## (undated) DEVICE — TOWEL,OR,DSP,ST,BLUE,STD,8/PK,10PK/CS: Brand: MEDLINE

## (undated) DEVICE — SUTURE NONABSORBABLE MONOFILAMENT 4-0 PS-2 18 IN BLU PROLENE 8682H

## (undated) DEVICE — NEEDLE HYPO 30GA L0.5IN BGE POLYPR HUB S STL REG BVL STR

## (undated) DEVICE — ELECTRODE ELECSURG NDL 2.8 INX7.2 CM COAT INSUL EDGE

## (undated) DEVICE — GLOVE SURG SZ 85 L12IN FNGR THK94MIL STD WHT LTX FREE

## (undated) DEVICE — INTENDED FOR TISSUE SEPARATION, AND OTHER PROCEDURES THAT REQUIRE A SHARP SURGICAL BLADE TO PUNCTURE OR CUT.: Brand: BARD-PARKER ® STAINLESS STEEL BLADES

## (undated) DEVICE — ADHESIVE SKIN CLSR 0.7ML TOP DERMBND ADV

## (undated) DEVICE — CHLORAPREP 26ML ORANGE

## (undated) DEVICE — ELECTRODE PT RET AD L9FT HI MOIST COND ADH HYDRGEL CORDED

## (undated) DEVICE — MINOR SET UP PACK: Brand: MEDLINE INDUSTRIES, INC.

## (undated) DEVICE — GLOVE SURG SZ 75 L12IN FNGR THK94MIL STD WHT LTX FREE

## (undated) DEVICE — SURGICAL PROCEDURE PACK IV U-BAR

## (undated) DEVICE — APPLICATOR MEDICATED 10.5 CC SOLUTION HI LT ORNG CHLORAPREP

## (undated) DEVICE — SUTURE MCRYL SZ 3-0 L27IN ABSRB UD L19MM PS-2 3/8 CIR PRIM Y427H

## (undated) DEVICE — GOWN,SIRUS,NON REINFRCD,LARGE,SET IN SL: Brand: MEDLINE

## (undated) DEVICE — SUTURE CHROMIC GUT SZ 5-0 L18IN ABSRB BRN P-3 L13MM 3/8 CIR 687G

## (undated) DEVICE — SOLUTION IV IRRIG 500ML 0.9% SODIUM CHL 2F7123